# Patient Record
Sex: FEMALE | Race: WHITE | Employment: FULL TIME | URBAN - METROPOLITAN AREA
[De-identification: names, ages, dates, MRNs, and addresses within clinical notes are randomized per-mention and may not be internally consistent; named-entity substitution may affect disease eponyms.]

---

## 2010-12-23 PROCEDURE — 88342 IMHCHEM/IMCYTCHM 1ST ANTB: CPT | Performed by: PATHOLOGY

## 2017-01-01 ENCOUNTER — TELEPHONE (OUTPATIENT)
Dept: ONCOLOGY | Age: 57
End: 2017-01-01

## 2017-01-01 ENCOUNTER — DOCUMENTATION ONLY (OUTPATIENT)
Dept: ONCOLOGY | Age: 57
End: 2017-01-01

## 2017-01-01 ENCOUNTER — HOSPITAL ENCOUNTER (OUTPATIENT)
Dept: INFUSION THERAPY | Age: 57
Discharge: HOME | End: 2017-09-22
Payer: COMMERCIAL

## 2017-01-01 ENCOUNTER — HOSPITAL ENCOUNTER (OUTPATIENT)
Dept: NON INVASIVE DIAGNOSTICS | Age: 57
Discharge: HOME | End: 2017-11-10
Attending: INTERNAL MEDICINE
Payer: COMMERCIAL

## 2017-01-01 ENCOUNTER — HOSPITAL ENCOUNTER (OUTPATIENT)
Dept: CT IMAGING | Age: 57
Discharge: HOME | End: 2017-11-10
Attending: INTERNAL MEDICINE
Payer: COMMERCIAL

## 2017-01-01 ENCOUNTER — HOSPITAL ENCOUNTER (OUTPATIENT)
Dept: INFUSION THERAPY | Age: 57
Discharge: HOME | End: 2017-10-12
Payer: COMMERCIAL

## 2017-01-01 ENCOUNTER — HOSPITAL ENCOUNTER (OUTPATIENT)
Dept: LAB | Age: 57
Discharge: HOME | End: 2017-10-16

## 2017-01-01 ENCOUNTER — OFFICE VISIT (OUTPATIENT)
Dept: ONCOLOGY | Age: 57
End: 2017-01-01

## 2017-01-01 VITALS
OXYGEN SATURATION: 100 % | HEIGHT: 67 IN | TEMPERATURE: 97.7 F | HEART RATE: 74 BPM | SYSTOLIC BLOOD PRESSURE: 99 MMHG | BODY MASS INDEX: 22.13 KG/M2 | WEIGHT: 141 LBS | DIASTOLIC BLOOD PRESSURE: 68 MMHG

## 2017-01-01 VITALS
TEMPERATURE: 97 F | SYSTOLIC BLOOD PRESSURE: 102 MMHG | HEART RATE: 70 BPM | DIASTOLIC BLOOD PRESSURE: 60 MMHG | BODY MASS INDEX: 22.76 KG/M2 | WEIGHT: 141 LBS | OXYGEN SATURATION: 98 %

## 2017-01-01 VITALS
RESPIRATION RATE: 16 BRPM | OXYGEN SATURATION: 98 % | SYSTOLIC BLOOD PRESSURE: 102 MMHG | BODY MASS INDEX: 23.85 KG/M2 | HEART RATE: 80 BPM | TEMPERATURE: 98.4 F | WEIGHT: 148.4 LBS | DIASTOLIC BLOOD PRESSURE: 67 MMHG | HEIGHT: 66 IN

## 2017-01-01 DIAGNOSIS — C50.919 MALIGNANT NEOPLASM OF BREAST, STAGE 4, UNSPECIFIED LATERALITY (HCC): Primary | ICD-10-CM

## 2017-01-01 DIAGNOSIS — C78.7 LIVER METASTASES (HCC): ICD-10-CM

## 2017-01-01 DIAGNOSIS — Z09 CHEMOTHERAPY FOLLOW-UP EXAMINATION: ICD-10-CM

## 2017-01-01 DIAGNOSIS — C79.51 BONE METASTASES (HCC): ICD-10-CM

## 2017-01-01 DIAGNOSIS — C50.919 MALIGNANT NEOPLASM OF BREAST, STAGE 4, UNSPECIFIED LATERALITY (HCC): ICD-10-CM

## 2017-01-01 LAB
ALBUMIN SERPL-MCNC: 3.6 G/DL (ref 3.5–5)
ALBUMIN SERPL-MCNC: 3.6 G/DL (ref 3.5–5)
ALBUMIN/GLOB SERPL: 0.9 {RATIO} (ref 1.1–2.2)
ALBUMIN/GLOB SERPL: 0.9 {RATIO} (ref 1.1–2.2)
ALP SERPL-CCNC: 167 U/L (ref 45–117)
ALP SERPL-CCNC: 213 U/L (ref 45–117)
ALT SERPL-CCNC: 120 U/L (ref 12–78)
ALT SERPL-CCNC: 120 U/L (ref 12–78)
ANION GAP SERPL CALC-SCNC: 7 MMOL/L (ref 5–15)
ANION GAP SERPL CALC-SCNC: 9 MMOL/L (ref 5–15)
AST SERPL-CCNC: 93 U/L (ref 15–37)
AST SERPL-CCNC: 99 U/L (ref 15–37)
BASO+EOS+MONOS # BLD AUTO: 0.5 K/UL (ref 0.2–1.2)
BASO+EOS+MONOS # BLD AUTO: 0.6 K/UL (ref 0.2–1.2)
BASO+EOS+MONOS # BLD AUTO: 11 % (ref 3.2–16.9)
BASO+EOS+MONOS # BLD AUTO: 13 % (ref 3.2–16.9)
BILIRUB SERPL-MCNC: 0.3 MG/DL (ref 0.2–1)
BILIRUB SERPL-MCNC: 0.3 MG/DL (ref 0.2–1)
BUN SERPL-MCNC: 14 MG/DL (ref 6–20)
BUN SERPL-MCNC: 14 MG/DL (ref 6–20)
BUN/CREAT SERPL: 19 (ref 12–20)
BUN/CREAT SERPL: 23 (ref 12–20)
CALCIUM SERPL-MCNC: 8.7 MG/DL (ref 8.5–10.1)
CALCIUM SERPL-MCNC: 8.9 MG/DL (ref 8.5–10.1)
CANCER AG15-3 SERPL-ACNC: 91.6 U/ML (ref 0–25)
CANCER AG27-29 SERPL-ACNC: 103.6 U/ML (ref 0–38.6)
CHLORIDE SERPL-SCNC: 104 MMOL/L (ref 97–108)
CHLORIDE SERPL-SCNC: 105 MMOL/L (ref 97–108)
CO2 SERPL-SCNC: 28 MMOL/L (ref 21–32)
CO2 SERPL-SCNC: 30 MMOL/L (ref 21–32)
CREAT SERPL-MCNC: 0.62 MG/DL (ref 0.55–1.02)
CREAT SERPL-MCNC: 0.74 MG/DL (ref 0.55–1.02)
DIFFERENTIAL METHOD BLD: ABNORMAL
DIFFERENTIAL METHOD BLD: NORMAL
ERYTHROCYTE [DISTWIDTH] IN BLOOD BY AUTOMATED COUNT: 14 % (ref 11.8–15.8)
ERYTHROCYTE [DISTWIDTH] IN BLOOD BY AUTOMATED COUNT: 14.5 % (ref 11.8–15.8)
GLOBULIN SER CALC-MCNC: 3.8 G/DL (ref 2–4)
GLOBULIN SER CALC-MCNC: 4.1 G/DL (ref 2–4)
GLUCOSE SERPL-MCNC: 108 MG/DL (ref 65–100)
GLUCOSE SERPL-MCNC: 108 MG/DL (ref 65–100)
HCT VFR BLD AUTO: 34.8 % (ref 35–47)
HCT VFR BLD AUTO: 36.2 % (ref 35–47)
HGB BLD-MCNC: 12.1 G/DL (ref 11.5–16)
HGB BLD-MCNC: 12.2 G/DL (ref 11.5–16)
LYMPHOCYTES # BLD: 1.3 K/UL (ref 0.8–3.5)
LYMPHOCYTES # BLD: 1.3 K/UL (ref 0.8–3.5)
LYMPHOCYTES NFR BLD: 28 % (ref 12–49)
LYMPHOCYTES NFR BLD: 28 % (ref 12–49)
MCH RBC QN AUTO: 31.2 PG (ref 26–34)
MCH RBC QN AUTO: 32 PG (ref 26–34)
MCHC RBC AUTO-ENTMCNC: 33.7 G/DL (ref 30–36.5)
MCHC RBC AUTO-ENTMCNC: 34.8 G/DL (ref 30–36.5)
MCV RBC AUTO: 92.1 FL (ref 80–99)
MCV RBC AUTO: 92.6 FL (ref 80–99)
NEUTS SEG # BLD: 2.7 K/UL (ref 1.8–8)
NEUTS SEG # BLD: 2.8 K/UL (ref 1.8–8)
NEUTS SEG NFR BLD: 59 % (ref 32–75)
NEUTS SEG NFR BLD: 61 % (ref 32–75)
PLATELET # BLD AUTO: 201 K/UL (ref 150–400)
PLATELET # BLD AUTO: 230 K/UL (ref 150–400)
POTASSIUM SERPL-SCNC: 3.7 MMOL/L (ref 3.5–5.1)
POTASSIUM SERPL-SCNC: 3.8 MMOL/L (ref 3.5–5.1)
PROT SERPL-MCNC: 7.4 G/DL (ref 6.4–8.2)
PROT SERPL-MCNC: 7.7 G/DL (ref 6.4–8.2)
RBC # BLD AUTO: 3.78 M/UL (ref 3.8–5.2)
RBC # BLD AUTO: 3.91 M/UL (ref 3.8–5.2)
SODIUM SERPL-SCNC: 141 MMOL/L (ref 136–145)
SODIUM SERPL-SCNC: 142 MMOL/L (ref 136–145)
WBC # BLD AUTO: 4.6 K/UL (ref 3.6–11)
WBC # BLD AUTO: 4.6 K/UL (ref 3.6–11)

## 2017-01-01 PROCEDURE — 86300 IMMUNOASSAY TUMOR CA 15-3: CPT | Performed by: INTERNAL MEDICINE

## 2017-01-01 PROCEDURE — 80053 COMPREHEN METABOLIC PANEL: CPT | Performed by: INTERNAL MEDICINE

## 2017-01-01 PROCEDURE — 77030012965 HC NDL HUBR BBMI -A

## 2017-01-01 PROCEDURE — 93306 TTE W/DOPPLER COMPLETE: CPT

## 2017-01-01 PROCEDURE — 74011000250 HC RX REV CODE- 250: Performed by: INTERNAL MEDICINE

## 2017-01-01 PROCEDURE — 36415 COLL VENOUS BLD VENIPUNCTURE: CPT | Performed by: INTERNAL MEDICINE

## 2017-01-01 PROCEDURE — 74011250636 HC RX REV CODE- 250/636: Performed by: INTERNAL MEDICINE

## 2017-01-01 PROCEDURE — 74176 CT ABD & PELVIS W/O CONTRAST: CPT

## 2017-01-01 PROCEDURE — 96413 CHEMO IV INFUSION 1 HR: CPT

## 2017-01-01 PROCEDURE — 85025 COMPLETE CBC W/AUTO DIFF WBC: CPT | Performed by: INTERNAL MEDICINE

## 2017-01-01 PROCEDURE — 71250 CT THORAX DX C-: CPT

## 2017-01-01 RX ORDER — SODIUM CHLORIDE 9 MG/ML
10 INJECTION INTRAMUSCULAR; INTRAVENOUS; SUBCUTANEOUS AS NEEDED
Status: ACTIVE | OUTPATIENT
Start: 2017-01-01 | End: 2017-01-01

## 2017-01-01 RX ORDER — PROCHLORPERAZINE EDISYLATE 5 MG/ML
10 INJECTION INTRAMUSCULAR; INTRAVENOUS
Status: ACTIVE | OUTPATIENT
Start: 2017-01-01 | End: 2017-01-01

## 2017-01-01 RX ORDER — HEPARIN 100 UNIT/ML
500 SYRINGE INTRAVENOUS AS NEEDED
Status: ACTIVE | OUTPATIENT
Start: 2017-01-01 | End: 2017-01-01

## 2017-01-01 RX ORDER — DEXAMETHASONE SODIUM PHOSPHATE 4 MG/ML
8 INJECTION, SOLUTION INTRA-ARTICULAR; INTRALESIONAL; INTRAMUSCULAR; INTRAVENOUS; SOFT TISSUE ONCE
Status: ACTIVE | OUTPATIENT
Start: 2017-01-01 | End: 2017-01-01

## 2017-01-01 RX ORDER — SODIUM CHLORIDE 9 MG/ML
25 INJECTION, SOLUTION INTRAVENOUS CONTINUOUS
Status: DISPENSED | OUTPATIENT
Start: 2017-01-01 | End: 2017-01-01

## 2017-01-01 RX ORDER — SODIUM CHLORIDE 0.9 % (FLUSH) 0.9 %
5-10 SYRINGE (ML) INJECTION AS NEEDED
Status: ACTIVE | OUTPATIENT
Start: 2017-01-01 | End: 2017-01-01

## 2017-01-01 RX ADMIN — Medication 500 UNITS: at 15:40

## 2017-01-01 RX ADMIN — SODIUM CHLORIDE 25 ML/HR: 900 INJECTION, SOLUTION INTRAVENOUS at 14:40

## 2017-01-01 RX ADMIN — Medication 10 ML: at 15:29

## 2017-01-01 RX ADMIN — ADO-TRASTUZUMAB EMTANSINE 200 MG: 20 INJECTION, POWDER, LYOPHILIZED, FOR SOLUTION INTRAVENOUS at 15:03

## 2017-01-01 RX ADMIN — SODIUM CHLORIDE 25 ML/HR: 900 INJECTION, SOLUTION INTRAVENOUS at 15:00

## 2017-01-01 RX ADMIN — ADO-TRASTUZUMAB EMTANSINE 200 MG: 20 INJECTION, POWDER, LYOPHILIZED, FOR SOLUTION INTRAVENOUS at 14:38

## 2017-01-01 RX ADMIN — SODIUM CHLORIDE 10 ML: 9 INJECTION INTRAMUSCULAR; INTRAVENOUS; SUBCUTANEOUS at 15:30

## 2017-01-01 RX ADMIN — Medication 10 ML: at 15:40

## 2017-01-01 RX ADMIN — SODIUM CHLORIDE 10 ML: 9 INJECTION INTRAMUSCULAR; INTRAVENOUS; SUBCUTANEOUS at 13:30

## 2017-01-01 RX ADMIN — HEPARIN SODIUM (PORCINE) LOCK FLUSH IV SOLN 100 UNIT/ML 500 UNITS: 100 SOLUTION at 15:29

## 2017-01-03 ENCOUNTER — TELEPHONE (OUTPATIENT)
Dept: ONCOLOGY | Age: 57
End: 2017-01-03

## 2017-01-03 RX ORDER — SODIUM CHLORIDE 9 MG/ML
25 INJECTION, SOLUTION INTRAVENOUS CONTINUOUS
Status: DISPENSED | OUTPATIENT
Start: 2017-01-09 | End: 2017-01-10

## 2017-01-06 ENCOUNTER — TELEPHONE (OUTPATIENT)
Dept: ONCOLOGY | Age: 57
End: 2017-01-06

## 2017-01-06 NOTE — TELEPHONE ENCOUNTER
Message left for patient re:  Questions prior to herceptin Monday. Will need appointment changed to Ruma's schedule on 1/12/17. To PSR.

## 2017-01-09 ENCOUNTER — DOCUMENTATION ONLY (OUTPATIENT)
Dept: ONCOLOGY | Age: 57
End: 2017-01-09

## 2017-01-09 ENCOUNTER — HOSPITAL ENCOUNTER (OUTPATIENT)
Dept: INFUSION THERAPY | Age: 57
Discharge: HOME | End: 2017-01-09
Payer: COMMERCIAL

## 2017-01-09 VITALS
BODY MASS INDEX: 23.4 KG/M2 | WEIGHT: 149.1 LBS | HEART RATE: 84 BPM | OXYGEN SATURATION: 100 % | TEMPERATURE: 98.3 F | RESPIRATION RATE: 16 BRPM | SYSTOLIC BLOOD PRESSURE: 97 MMHG | HEIGHT: 67 IN | DIASTOLIC BLOOD PRESSURE: 63 MMHG

## 2017-01-09 LAB
ALBUMIN SERPL BCP-MCNC: 3.3 G/DL (ref 3.5–5)
ALBUMIN/GLOB SERPL: 0.9 {RATIO} (ref 1.1–2.2)
ALP SERPL-CCNC: 69 U/L (ref 45–117)
ALT SERPL-CCNC: 39 U/L (ref 12–78)
ANION GAP BLD CALC-SCNC: 8 MMOL/L (ref 5–15)
AST SERPL W P-5'-P-CCNC: 26 U/L (ref 15–37)
BASOPHILS # BLD AUTO: 0 K/UL (ref 0–0.1)
BASOPHILS # BLD: 0 % (ref 0–1)
BILIRUB SERPL-MCNC: 0.2 MG/DL (ref 0.2–1)
BUN SERPL-MCNC: 14 MG/DL (ref 6–20)
BUN/CREAT SERPL: 22 (ref 12–20)
CALCIUM SERPL-MCNC: 8.5 MG/DL (ref 8.5–10.1)
CHLORIDE SERPL-SCNC: 106 MMOL/L (ref 97–108)
CO2 SERPL-SCNC: 26 MMOL/L (ref 21–32)
CREAT SERPL-MCNC: 0.63 MG/DL (ref 0.55–1.02)
DIFFERENTIAL METHOD BLD: NORMAL
EOSINOPHIL # BLD: 0.1 K/UL (ref 0–0.4)
EOSINOPHIL NFR BLD: 1 % (ref 0–7)
ERYTHROCYTE [DISTWIDTH] IN BLOOD BY AUTOMATED COUNT: 13 % (ref 11.5–14.5)
GLOBULIN SER CALC-MCNC: 3.7 G/DL (ref 2–4)
GLUCOSE SERPL-MCNC: 147 MG/DL (ref 65–100)
HCT VFR BLD AUTO: 35.8 % (ref 35–47)
HGB BLD-MCNC: 11.9 G/DL (ref 11.5–16)
LYMPHOCYTES # BLD AUTO: 13 % (ref 12–49)
LYMPHOCYTES # BLD: 0.8 K/UL (ref 0.8–3.5)
MAGNESIUM SERPL-MCNC: 2.2 MG/DL (ref 1.6–2.4)
MCH RBC QN AUTO: 31 PG (ref 26–34)
MCHC RBC AUTO-ENTMCNC: 33.2 G/DL (ref 30–36.5)
MCV RBC AUTO: 93.2 FL (ref 80–99)
MONOCYTES # BLD: 0.8 K/UL (ref 0–1)
MONOCYTES NFR BLD AUTO: 13 % (ref 5–13)
NEUTS SEG # BLD: 4.4 K/UL (ref 1.8–8)
NEUTS SEG NFR BLD AUTO: 73 % (ref 32–75)
PLATELET # BLD AUTO: 345 K/UL (ref 150–400)
POTASSIUM SERPL-SCNC: 3.7 MMOL/L (ref 3.5–5.1)
PROT SERPL-MCNC: 7 G/DL (ref 6.4–8.2)
RBC # BLD AUTO: 3.84 M/UL (ref 3.8–5.2)
RBC MORPH BLD: NORMAL
SODIUM SERPL-SCNC: 140 MMOL/L (ref 136–145)
WBC # BLD AUTO: 6.1 K/UL (ref 3.6–11)

## 2017-01-09 PROCEDURE — 85025 COMPLETE CBC W/AUTO DIFF WBC: CPT | Performed by: INTERNAL MEDICINE

## 2017-01-09 PROCEDURE — 86300 IMMUNOASSAY TUMOR CA 15-3: CPT | Performed by: INTERNAL MEDICINE

## 2017-01-09 PROCEDURE — 74011250636 HC RX REV CODE- 250/636: Performed by: INTERNAL MEDICINE

## 2017-01-09 PROCEDURE — 83735 ASSAY OF MAGNESIUM: CPT | Performed by: INTERNAL MEDICINE

## 2017-01-09 PROCEDURE — 96413 CHEMO IV INFUSION 1 HR: CPT

## 2017-01-09 PROCEDURE — 36415 COLL VENOUS BLD VENIPUNCTURE: CPT | Performed by: INTERNAL MEDICINE

## 2017-01-09 PROCEDURE — 80053 COMPREHEN METABOLIC PANEL: CPT | Performed by: INTERNAL MEDICINE

## 2017-01-09 PROCEDURE — 77030012965 HC NDL HUBR BBMI -A

## 2017-01-09 RX ORDER — SODIUM CHLORIDE 0.9 % (FLUSH) 0.9 %
10-40 SYRINGE (ML) INJECTION AS NEEDED
Status: ACTIVE | OUTPATIENT
Start: 2017-01-09 | End: 2017-01-10

## 2017-01-09 RX ORDER — SODIUM CHLORIDE 9 MG/ML
10 INJECTION INTRAMUSCULAR; INTRAVENOUS; SUBCUTANEOUS AS NEEDED
Status: ACTIVE | OUTPATIENT
Start: 2017-01-09 | End: 2017-01-10

## 2017-01-09 RX ORDER — HEPARIN 100 UNIT/ML
500 SYRINGE INTRAVENOUS AS NEEDED
Status: ACTIVE | OUTPATIENT
Start: 2017-01-09 | End: 2017-01-10

## 2017-01-09 RX ADMIN — HEPARIN SODIUM (PORCINE) LOCK FLUSH IV SOLN 100 UNIT/ML 500 UNITS: 100 SOLUTION at 16:40

## 2017-01-09 RX ADMIN — Medication 554 MG: at 15:05

## 2017-01-09 RX ADMIN — Medication 10 ML: at 16:37

## 2017-01-09 RX ADMIN — SODIUM CHLORIDE 25 ML/HR: 900 INJECTION, SOLUTION INTRAVENOUS at 15:00

## 2017-01-09 NOTE — TELEPHONE ENCOUNTER
Call returned to patient. ML to return call. Herceptin patients are seen every 6 weeks while on treatment.

## 2017-01-09 NOTE — PROGRESS NOTES
[facility] VISIT NOTE    7778  Pt arrived at [facility] ambulatory and in no distress for C1 HERCEPTIN. Assessment completed, pt has no Concerns . Chemotherapy Flowsheet 1/9/2017   Cycle C1   Date 1/9/2017   Drug / Regimen Trastuzumab        Right chest port accessed with 0.75 in low with no difficulty. Positive blood return noted and labs drawn. Recent Results (from the past 12 hour(s))   CBC WITH AUTOMATED DIFF    Collection Time: 01/09/17  2:25 PM   Result Value Ref Range    WBC 6.1 3.6 - 11.0 K/uL    RBC 3.84 3.80 - 5.20 M/uL    HGB 11.9 11.5 - 16.0 g/dL    HCT 35.8 35.0 - 47.0 %    MCV 93.2 80.0 - 99.0 FL    MCH 31.0 26.0 - 34.0 PG    MCHC 33.2 30.0 - 36.5 g/dL    RDW 13.0 11.5 - 14.5 %    PLATELET 706 950 - 860 K/uL    NEUTROPHILS 73 32 - 75 %    LYMPHOCYTES 13 12 - 49 %    MONOCYTES 13 5 - 13 %    EOSINOPHILS 1 0 - 7 %    BASOPHILS 0 0 - 1 %    ABS. NEUTROPHILS 4.4 1.8 - 8.0 K/UL    ABS. LYMPHOCYTES 0.8 0.8 - 3.5 K/UL    ABS. MONOCYTES 0.8 0.0 - 1.0 K/UL    ABS. EOSINOPHILS 0.1 0.0 - 0.4 K/UL    ABS. BASOPHILS 0.0 0.0 - 0.1 K/UL    DF SMEAR SCANNED      RBC COMMENTS NORMOCYTIC, NORMOCHROMIC     METABOLIC PANEL, COMPREHENSIVE    Collection Time: 01/09/17  2:25 PM   Result Value Ref Range    Sodium 140 136 - 145 mmol/L    Potassium 3.7 3.5 - 5.1 mmol/L    Chloride 106 97 - 108 mmol/L    CO2 26 21 - 32 mmol/L    Anion gap 8 5 - 15 mmol/L    Glucose 147 (H) 65 - 100 mg/dL    BUN 14 6 - 20 MG/DL    Creatinine 0.63 0.55 - 1.02 MG/DL    BUN/Creatinine ratio 22 (H) 12 - 20      GFR est AA >60 >60 ml/min/1.73m2    GFR est non-AA >60 >60 ml/min/1.73m2    Calcium 8.5 8.5 - 10.1 MG/DL    Bilirubin, total 0.2 0.2 - 1.0 MG/DL    ALT 39 12 - 78 U/L    AST 26 15 - 37 U/L    Alk.  phosphatase 69 45 - 117 U/L    Protein, total 7.0 6.4 - 8.2 g/dL    Albumin 3.3 (L) 3.5 - 5.0 g/dL    Globulin 3.7 2.0 - 4.0 g/dL    A-G Ratio 0.9 (L) 1.1 - 2.2     MAGNESIUM    Collection Time: 01/09/17  2:25 PM   Result Value Ref Range Magnesium 2.2 1.6 - 2.4 mg/dL       Medications received:  Trastuzumab    Tolerated treatment well, no adverse reaction noted. Port de-accessed and flushed per protocol. Positive blood return noted. Patient refused 30 min wait   Patient Vitals for the past 12 hrs:   Temp Pulse Resp BP SpO2   01/09/17 1636 98.3 °F (36.8 °C) 84 16 97/63 100 %   01/09/17 1415 98.9 °F (37.2 °C) 88 16 100/66 100 %       1640  D/C'd from Health system ambulatory and in no distress. Next appointment ? Efren Nguyen

## 2017-01-09 NOTE — TELEPHONE ENCOUNTER
HIPAA verified. Stated has no questions. Wanted to see Dr. Rafael Tong in 3 weeks versus this week. Patient with prior herceptin therapy and side effects reviewed to report to provider. To provider for ok to be seen in 3 weeks.

## 2017-01-10 NOTE — TELEPHONE ENCOUNTER
Order sent to [facility] to schedule every other Herceptin appointment at [facility] starting 1/30/17. She will receive every other infusion at [address] [place] [address].

## 2017-01-10 NOTE — TELEPHONE ENCOUNTER
Call to [facility] EVIN SMILEY/Eliud to verify latest herceptin appt. Per Yani Aviles 2p is latest time patient can be scheduled.

## 2017-01-11 ENCOUNTER — DOCUMENTATION ONLY (OUTPATIENT)
Dept: ONCOLOGY | Age: 57
End: 2017-01-11

## 2017-01-11 LAB
CANCER AG15-3 SERPL-ACNC: 147.8 U/ML (ref 0–25)
CANCER AG27-29 SERPL-ACNC: 185.5 U/ML (ref 0–38.6)

## 2017-01-11 NOTE — PROGRESS NOTES
Herceptin orders to schedule every other treatment at [facility] faxed to [facility] 1/10/17 per NP. Will need [place] appt 1/30/17.

## 2017-01-25 ENCOUNTER — DOCUMENTATION ONLY (OUTPATIENT)
Dept: ONCOLOGY | Age: 57
End: 2017-01-25

## 2017-01-27 RX ORDER — SODIUM CHLORIDE 9 MG/ML
25 INJECTION, SOLUTION INTRAVENOUS CONTINUOUS
Status: DISPENSED | OUTPATIENT
Start: 2017-01-31 | End: 2017-02-01

## 2017-01-30 ENCOUNTER — APPOINTMENT (OUTPATIENT)
Dept: INFUSION THERAPY | Age: 57
End: 2017-01-30
Payer: COMMERCIAL

## 2017-01-31 ENCOUNTER — OFFICE VISIT (OUTPATIENT)
Dept: ONCOLOGY | Age: 57
End: 2017-01-31

## 2017-01-31 ENCOUNTER — HOSPITAL ENCOUNTER (OUTPATIENT)
Dept: INFUSION THERAPY | Age: 57
Discharge: HOME | End: 2017-01-31
Payer: COMMERCIAL

## 2017-01-31 VITALS
OXYGEN SATURATION: 99 % | RESPIRATION RATE: 16 BRPM | HEART RATE: 99 BPM | DIASTOLIC BLOOD PRESSURE: 85 MMHG | HEIGHT: 66 IN | BODY MASS INDEX: 24.14 KG/M2 | TEMPERATURE: 98.1 F | SYSTOLIC BLOOD PRESSURE: 137 MMHG | WEIGHT: 150.2 LBS

## 2017-01-31 VITALS
HEART RATE: 80 BPM | RESPIRATION RATE: 16 BRPM | SYSTOLIC BLOOD PRESSURE: 104 MMHG | DIASTOLIC BLOOD PRESSURE: 60 MMHG | TEMPERATURE: 97.7 F

## 2017-01-31 DIAGNOSIS — C78.7 LIVER METASTASES (HCC): ICD-10-CM

## 2017-01-31 DIAGNOSIS — Z51.81 ENCOUNTER FOR MONITORING CARDIOTOXIC DRUG THERAPY: ICD-10-CM

## 2017-01-31 DIAGNOSIS — Z79.899 ENCOUNTER FOR MONITORING CARDIOTOXIC DRUG THERAPY: ICD-10-CM

## 2017-01-31 DIAGNOSIS — C79.51 BONE METASTASES (HCC): ICD-10-CM

## 2017-01-31 DIAGNOSIS — C50.919 BREAST CANCER, STAGE 4, UNSPECIFIED LATERALITY (HCC): Primary | ICD-10-CM

## 2017-01-31 PROCEDURE — 74011250636 HC RX REV CODE- 250/636: Performed by: INTERNAL MEDICINE

## 2017-01-31 PROCEDURE — 74011250636 HC RX REV CODE- 250/636

## 2017-01-31 PROCEDURE — 96413 CHEMO IV INFUSION 1 HR: CPT

## 2017-01-31 PROCEDURE — 74011000250 HC RX REV CODE- 250

## 2017-01-31 PROCEDURE — 77030012965 HC NDL HUBR BBMI -A

## 2017-01-31 RX ORDER — SODIUM CHLORIDE 0.9 % (FLUSH) 0.9 %
5-10 SYRINGE (ML) INJECTION AS NEEDED
Status: DISCONTINUED | OUTPATIENT
Start: 2017-01-31 | End: 2017-02-04 | Stop reason: HOSPADM

## 2017-01-31 RX ORDER — HEPARIN 100 UNIT/ML
500 SYRINGE INTRAVENOUS AS NEEDED
Status: ACTIVE | OUTPATIENT
Start: 2017-01-31 | End: 2017-02-01

## 2017-01-31 RX ORDER — SODIUM CHLORIDE 9 MG/ML
10 INJECTION INTRAMUSCULAR; INTRAVENOUS; SUBCUTANEOUS AS NEEDED
Status: ACTIVE | OUTPATIENT
Start: 2017-01-31 | End: 2017-02-01

## 2017-01-31 RX ORDER — OXYCODONE HYDROCHLORIDE 5 MG/1
TABLET ORAL
Refills: 0 | COMMUNITY
Start: 2016-11-28 | End: 2017-01-31

## 2017-01-31 RX ADMIN — Medication 10 ML: at 16:13

## 2017-01-31 RX ADMIN — TRASTUZUMAB 415 MG: KIT at 15:41

## 2017-01-31 RX ADMIN — SODIUM CHLORIDE 25 ML/HR: 900 INJECTION, SOLUTION INTRAVENOUS at 15:10

## 2017-01-31 RX ADMIN — Medication 500 UNITS: at 16:13

## 2017-01-31 RX ADMIN — SODIUM CHLORIDE 10 ML: 9 INJECTION, SOLUTION INTRAMUSCULAR; INTRAVENOUS; SUBCUTANEOUS at 15:10

## 2017-01-31 NOTE — PROGRESS NOTES
[facility] - Chemotherapy Progress Note    1500 Pt admit to [facility] for cycle 2 herceptin ambulatory in stable condition. Assessment completed. No new concerns voiced. Port with positive blood return. Labs drawn 1/9/17, drawn every six weeks. Patient will have appointments here at [facility] and at [facility]. Chemotherapy Flowsheet 1/31/2017   Cycle C2   Date 1/31/2017   Drug / Regimen herceptin   Notes given     Visit Vitals    /68 (BP 1 Location: Right arm, BP Patient Position: Sitting)    Pulse 79    Temp 97 °F (36.1 °C)    Resp 16    Breastfeeding No     Medications:  Normal saline  herceptin  Heparin flush    1620 Pt tolerated treatment well. Port maintained positive blood return throughout treatment. Flushed, heparinized and de-accessed per protocol. D/c home ambulatory in no distress. Pt aware of next appointment scheduled for 2/21/17 at 1400 at [address] [place] [address] location for herceptin.

## 2017-01-31 NOTE — PROGRESS NOTES
HEME/ONC CONSULT       Toyin Nance is a 64 y.o. 1960 female and presents with Breast Cancer    CC  Breast cancer 2004    HPI: Ms. Bear Holliday is here today for follow-up for stage 4 breast cancer, now on Herceptin. She has completed radiation to her spine since she was last here for cord compression. She reports her back pain has mostly resolved and she is off dexamethasone. She has follow-up with radiation oncology planned within the next 2 weeks. She reports that due to the loading dose of Herceptin she received with her first treatment, she felt \"flu-like\" afterwards but symptoms resolved within 3 days of the infusion. She denies nausea, vomiting, or diarrhea. She denies shortness of breath, chest pain, or palpitations. She denies headaches or vision changes. She has had no new areas of pain. She denies bowel or bladder incontinence or weakness of her upper or lower extremities. She does state that it was difficult to eat while receiving radiation due to irritation of the esophagus but that has now resolved. She is eating and drinking well. Last visit: Pt seen today for fu for stage 4 breast cancer ER+ HEr2 + not on any treatment for over a year per pt preference. Pt had CTs and MRI which showed progressive disease. Pt is here with family to discuss results and treatment options. Pt continues with severe back pain. Has not started decadron yet. DX   Encounter Diagnoses   Name Primary?  Breast cancer, stage 4, unspecified laterality ([address] Yes    Encounter for monitoring cardiotoxic drug therapy     Liver metastases ([address]     Bone metastases ([address]         STAGE: 4 liver and bones    TREATMENT COURSE: 4/2004, s/p L mastectomy with Dr. Saurav Randall at [facility]. Stage IIB, T2N1M0, 3/39 LN +, ER negative, OH +, HER 2 +, was enrolled on NSABP B-31 trial with adriamycin/cytoxan q 3 weeks x 4 followed by taxol weekly with herceptin x 12 then completion of a year of herceptin. States she did not receive XRT. Did receive 5 years of tamoxifen from 5238-9455, which she tolerated well. In 2009 she noticed a chest wall mass. 11/23/10 FNA of chest wall mass showed ER + > 90%, NV + > 90%, MIB 50%, HER 2 + by FISH ratio 6.29. S/p BSO 12/27/10. Received zometa from 2010 to 2011. In 2011, she was started on q 3 week trastuzumab and exemestane, but states she took the exemestane sparingly due to joint pains and body aches. Has not taken any AI since 2013. Briefly took anastrozole in 5/2014. Stopped anastrozole 1/1/15 due to continued body and joint aches. No past medical history on file. Past Surgical History   Procedure Laterality Date    Pr breast surgery procedure unlisted  2004     mastectomy    Hx gyn  2010     ovaries removed     Social History     Social History    Marital status:      Spouse name: N/A    Number of children: N/A    Years of education: N/A     Social History Main Topics    Smoking status: Never Smoker    Smokeless tobacco: Never Used    Alcohol use Yes      Comment: 3-4 drinks/month    Drug use: No    Sexual activity: No     Other Topics Concern    None     Social History Narrative     Family History   Problem Relation Age of Onset    Hypertension Mother     Cancer Father      mesothelioma    Cancer Maternal Grandfather 76     Bladder       Current Outpatient Prescriptions   Medication Sig Dispense Refill    MILK THISTLE, BULK, by Does Not Apply route.  IODINE PO Take  by mouth.  BETA 1,3 GLUCAN, BULK, 3 Tabs by Does Not Apply route daily.  OTHER,NON-FORMULARY, Mushroom extract twice daily; Crocifurous extract      TURMERIC, BULK, by Does Not Apply route daily.  cholecalciferol (VITAMIN D3) 1,000 unit tablet Take 4,000 Units by mouth daily.  multivitamin (ONE A DAY) tablet Take 1 tablet by mouth daily.  Aspirin, Buffered 81 mg tab Take  by mouth daily.  coenzyme q10 (CO Q-10) 10 mg cap Take  by mouth daily.       LACTOBAC CMB #3/FOS/PANTETHINE (PROBIOTIC & ACIDOPHILUS PO) Take  by mouth daily.  magnesium 250 mg tab Take 500 mg by mouth daily.  HYDROmorphone (DILAUDID) 2 mg tablet Take 1 Tab by mouth every four (4) hours as needed for Pain. Max Daily Amount: 12 mg. Indications: PAIN 60 Tab 0    dexamethasone (DECADRON) 4 mg tablet Take 1 Tab by mouth two (2) times daily (with meals).  60 Tab 1     Facility-Administered Medications Ordered in Other Visits   Medication Dose Route Frequency Provider Last Rate Last Dose    sodium chloride (NS) flush 5-10 mL  5-10 mL IntraVENous PRN Zoey Crenshaw MD   10 mL at 01/31/17 1613    sodium chloride 0.9 % injection 10 mL  10 mL IntraVENous PRN Zoey Crenshaw MD   10 mL at 01/31/17 1510    heparin (porcine) pf 500 Units  500 Units IntraVENous PRN Zoey Crenshaw MD   500 Units at 01/31/17 1613    0.9% sodium chloride infusion  25 mL/hr IntraVENous CONTINUOUS Darylene DO Carroll   Stopped at 01/31/17 1613       Allergies   Allergen Reactions    Pcn [Penicillins] Anaphylaxis       Review of Systems    A comprehensive review of systems was negative except for: per HPI       Objective:  Visit Vitals    /85    Pulse 99    Temp 98.1 °F (36.7 °C) (Oral)    Resp 16    Ht 5' 6\" (1.676 m)    Wt 150 lb 3.2 oz (68.1 kg)    SpO2 99%    BMI 24.24 kg/m2         Physical Exam:   General appearance - alert, well appearing, and in no distress, oriented to person, place, and time and normal appearing weight  Mental status - alert, oriented to person, place, and time, normal mood, behavior, speech, dress, motor activity, and thought processes  EYE-conj clear  Mouth - mucous membranes moist, pharynx normal without lesions  Neck - supple, no adenopathy appreciated  Chest - clear to auscultation, no wheezes, rales or rhonchi, symmetric air entry   Heart - normal rate and regular rhythm   Abdomen - soft, nontender, nondistended, no masses or organomegaly  Ext-no pedal edema noted  Skin-Warm and dry. Intact without rash. Neuro -alert, oriented, normal speech, no focal findings or movement disorder noted    Diagnostic Imaging   reviewed    Results for orders placed during the hospital encounter of 12/07/16   CT CHEST ABD PELV W CONT    Narrative STUDY: CT CHEST ABD PELV W CONT, ABDOMEN, AND PELVIS WITH CONTRAST  Clinical history: Metastatic breast cancer, evaluate for progression      INDICATION: metastatic breast cancer, evaluate disease status    CONTRAST: 95 mL [id number]        COMPARISON: 8/10/2015, 11/10/14    TECHNIQUE:  Computed tomography images of the chest, abdomen, and pelvis were  obtained from the thoracic inlet through the pubic symphysis via spiral  acquisition after the administration of intravenous contrast. Multiplanar  reconstructions were performed in soft tissue and lung windows. FINDINGS:   CHEST:  Thyroid:  Normal in appearance. Mediastinum: There has been revision of a right IJ Port-A-Cath. A Port-A-Cath  tip projects in the right atrium. Normal size heart. No significant pericardial  fluid. Normal-sized main pulmonary artery. No lymphadenopathy. Lungs/pleura: Patent central airways. No airspace disease or pleural effusion. Unchanged nonspecific subcentimeter groundglass opacity left lower lobe . ABDOMEN AND PELVIS:  Liver and gallbladder:           Hepatic hypodensities    Segment 2 2-54 left hepatic lobe 12.8 x 12.8 mm. Right hepatic lobe 2-53 9 x 9 mm. Right hepatic lobe 2-43 8 x 8 mm. There are additionally 2 tiny hypodensities adjacent to the posterior right  hepatic vein also seen on image 2-53 approximately 4 to 5 mm in size. .       Normal-appearing gallbladder. Spleen, adrenal glands, and pancreas:  Normal in appearance. Kidneys:  Symmetric enhancement. No hydronephrosis. Bowel and peritoneum:  No bowel obstruction. No ascites. Genitourinary:  Normal appearing uterus, adnexa, and bladder. Vasculature and lymph nodes:  Patent vasculature. No lymphadenopathy. Osseous structures the chest, abdomen, and pelvis: Unchanged sclerotic  metastasis with severe pathologic compression fracture at T9. Unchanged  sclerotic metastases seen in the spine. Body wall:  Status post left mastectomy and breast implant placement. Postsurgical changes seen in the left axilla. Impression IMPRESSION:  1. Findings are consistent with interval progression of disease. There are new  hepatic lesions present up to 13 x 13 mm in size. .    2. Unchanged osseous metastases. Lab Results  reviewed  Lab Results   Component Value Date/Time    WBC 6.1 01/09/2017 02:25 PM    HGB 11.9 01/09/2017 02:25 PM    HCT 35.8 01/09/2017 02:25 PM    PLATELET 289 31/62/7229 02:25 PM    MCV 93.2 01/09/2017 02:25 PM       Lab Results   Component Value Date/Time    Sodium 140 01/09/2017 02:25 PM    Potassium 3.7 01/09/2017 02:25 PM    Chloride 106 01/09/2017 02:25 PM    CO2 26 01/09/2017 02:25 PM    Anion gap 8 01/09/2017 02:25 PM    Glucose 147 01/09/2017 02:25 PM    BUN 14 01/09/2017 02:25 PM    Creatinine 0.63 01/09/2017 02:25 PM    BUN/Creatinine ratio 22 01/09/2017 02:25 PM    GFR est AA >60 01/09/2017 02:25 PM    GFR est non-AA >60 01/09/2017 02:25 PM    Calcium 8.5 01/09/2017 02:25 PM    ALT 39 01/09/2017 02:25 PM    AST 26 01/09/2017 02:25 PM    Alk. phosphatase 69 01/09/2017 02:25 PM    Protein, total 7.0 01/09/2017 02:25 PM    Albumin 3.3 01/09/2017 02:25 PM    Globulin 3.7 01/09/2017 02:25 PM    A-G Ratio 0.9 01/09/2017 02:25 PM       Assessment/Plan:    Dani Andrade is a 64 y.o. female and presents with Breast Cancer    CC  Breast cancer 2004    DX   Encounter Diagnoses   Name Primary?  Breast cancer, stage 4, unspecified laterality ([address] Yes    Liver metastases ([address]     Bone metastases ([address]     Left-sided thoracic back pain, unspecified chronicity         STAGE: 4 liver and bones    TREATMENT COURSE: 4/2004, s/p L mastectomy with Dr. Ross Flynn at [facility].  Stage IIB, T2N1M0, 3/39 LN +, ER negative, SC +, HER 2 +, was enrolled on NSABP B-31 trial with adriamycin/cytoxan q 3 weeks x 4 followed by taxol weekly with herceptin x 12 then completion of a year of herceptin. States she did not receive XRT. Did receive 5 years of tamoxifen from 3403-7489, which she tolerated well. In 2009 she noticed a chest wall mass. 11/23/10 FNA of chest wall mass showed ER + > 90%, SC + > 90%, MIB 50%, HER 2 + by FISH ratio 6.29. S/p BSO 12/27/10. Received zometa from 2010 to 2011. In 2011, she was started on q 3 week trastuzumab and exemestane, but states she took the exemestane sparingly due to joint pains and body aches. Has not taken any AI since 2013. Briefly took anastrozole in 5/2014. Stopped anastrozole 1/1/15 due to continued body and joint aches. 1.  Stage 4 breast cancer ER+ HER2 amplified at recurrence with mets to liver and bones in past.     Not on any therapy now and has worsening new worsening back pain. Pt has been doing alternatives therapies but states she is now open to some standard therapies. We reviewed CTs today which show worsening bone and liver mets. MRI T spine showed new T8 met with slight cord compression. She is s/p radiation to the spine. Back pain has resolved    She is now off dexamethasone as well. Patient was willing to do Herceptin only and is now receiving Herceptin every 3 weeks. She declined other options including hormonal therapy. ECHO 12/16/16 showed EF 55%. Patient does have questions about additional blood tests through possible trial she found. She will send more information via [employer] to review. 2. Back pain. Resolved. She is s/p radiation to the spine for cord compression. Seen in conjunction with Beverly Crisostomo NP. Follow-up in 6 weeks. ICD-10-CM ICD-9-CM    1. Breast cancer, stage 4, unspecified laterality ([facility] C50.919 174.9 2D ECHO COMPLETE ADULT (TTE) W OR WO CONTR   2.  Encounter for monitoring cardiotoxic drug therapy Z51.81 V58.83 2D ECHO COMPLETE ADULT (TTE) W OR WO CONTR    Z79.899 V58.69    3. Liver metastases (HCC) C78.7 197.7    4. Bone metastases (HCC) C79.51 198.5        Current Outpatient Prescriptions   Medication Sig    MILK THISTLE, BULK, by Does Not Apply route.  IODINE PO Take  by mouth.  BETA 1,3 GLUCAN, BULK, 3 Tabs by Does Not Apply route daily.  OTHER,NON-FORMULARY, Mushroom extract twice daily; Crocifurous extract    TURMERIC, BULK, by Does Not Apply route daily.  cholecalciferol (VITAMIN D3) 1,000 unit tablet Take 4,000 Units by mouth daily.  multivitamin (ONE A DAY) tablet Take 1 tablet by mouth daily.  Aspirin, Buffered 81 mg tab Take  by mouth daily.  coenzyme q10 (CO Q-10) 10 mg cap Take  by mouth daily.  LACTOBAC CMB #3/FOS/PANTETHINE (PROBIOTIC & ACIDOPHILUS PO) Take  by mouth daily.  magnesium 250 mg tab Take 500 mg by mouth daily.  HYDROmorphone (DILAUDID) 2 mg tablet Take 1 Tab by mouth every four (4) hours as needed for Pain. Max Daily Amount: 12 mg. Indications: PAIN    dexamethasone (DECADRON) 4 mg tablet Take 1 Tab by mouth two (2) times daily (with meals). No current facility-administered medications for this visit. Facility-Administered Medications Ordered in Other Visits   Medication Dose Route Frequency    sodium chloride (NS) flush 5-10 mL  5-10 mL IntraVENous PRN    sodium chloride 0.9 % injection 10 mL  10 mL IntraVENous PRN    heparin (porcine) pf 500 Units  500 Units IntraVENous PRN    0.9% sodium chloride infusion  25 mL/hr IntraVENous CONTINUOUS       continue present plan, routine labs ordered, call if any problems  There are no Patient Instructions on file for this visit. Follow-up Disposition:  Return in about 6 weeks (around 3/14/2017).     Saqib Wang NP

## 2017-01-31 NOTE — PROGRESS NOTES
Problem: Patient Education: Go to Education Activity  Goal: Patient/Family Education  Outcome: Progressing Towards Goal  No labs today, 30 minutes infuse time

## 2017-02-07 ENCOUNTER — DOCUMENTATION ONLY (OUTPATIENT)
Dept: ONCOLOGY | Age: 57
End: 2017-02-07

## 2017-02-07 NOTE — PROGRESS NOTES
ECHO Scheduled for Feb 23 @1:00pm    [address], [address] [place] Phone: [phone]    Patient notified

## 2017-02-16 RX ORDER — SODIUM CHLORIDE 9 MG/ML
25 INJECTION, SOLUTION INTRAVENOUS CONTINUOUS
Status: DISPENSED | OUTPATIENT
Start: 2017-02-21 | End: 2017-02-22

## 2017-02-20 ENCOUNTER — APPOINTMENT (OUTPATIENT)
Dept: INFUSION THERAPY | Age: 57
End: 2017-02-20

## 2017-02-20 ENCOUNTER — TELEPHONE (OUTPATIENT)
Dept: ONCOLOGY | Age: 57
End: 2017-02-20

## 2017-02-20 NOTE — TELEPHONE ENCOUNTER
Skin symptoms are unlikely from Herceptin. She can use moisturizer for sensitive skin such as Aquaphor or Eucerin to see if this helps. She should follow-up with PCP for further evaluation. There is no problem with her continuing with Herceptin treatment tomorrow.

## 2017-02-20 NOTE — TELEPHONE ENCOUNTER
Julia Graham states that she has a herceptin treatment tomorrow. Feels like Dagoberto Watson has a sunburn\" no rash, no itch, no noticeable skin irritation, worsens as day progresses, does not interfere with sleep. Started 5 days ago. She would like to know if she should get treatment tomorrow, she isn't sure what could be the cause and is concerned it may worsen.

## 2017-02-20 NOTE — TELEPHONE ENCOUNTER
Call returned to patient. HIPAA verified. Notified of note per provider. She verbalizes understanding & asks if burning sensation could be effects from radiation treatments which she reports ended in January. Reports burning sensation to abdomen, groin & upper thighs. Confirms she will proceed with Herceptin treatment tomorrow as scheduled.

## 2017-02-21 ENCOUNTER — HOSPITAL ENCOUNTER (OUTPATIENT)
Dept: INFUSION THERAPY | Age: 57
Discharge: HOME | End: 2017-02-21
Payer: COMMERCIAL

## 2017-02-21 VITALS
HEIGHT: 67 IN | TEMPERATURE: 97.3 F | BODY MASS INDEX: 23.75 KG/M2 | OXYGEN SATURATION: 100 % | HEART RATE: 81 BPM | DIASTOLIC BLOOD PRESSURE: 73 MMHG | WEIGHT: 151.3 LBS | RESPIRATION RATE: 16 BRPM | SYSTOLIC BLOOD PRESSURE: 112 MMHG

## 2017-02-21 LAB
ALBUMIN SERPL BCP-MCNC: 3.8 G/DL (ref 3.5–5)
ALBUMIN/GLOB SERPL: 1.1 {RATIO} (ref 1.1–2.2)
ALP SERPL-CCNC: 69 U/L (ref 45–117)
ALT SERPL-CCNC: 76 U/L (ref 12–78)
ANION GAP BLD CALC-SCNC: 10 MMOL/L (ref 5–15)
AST SERPL W P-5'-P-CCNC: 40 U/L (ref 15–37)
BASO+EOS+MONOS # BLD AUTO: 0.4 K/UL (ref 0.2–1.2)
BASO+EOS+MONOS # BLD AUTO: 10 % (ref 3.2–16.9)
BILIRUB SERPL-MCNC: 0.2 MG/DL (ref 0.2–1)
BUN SERPL-MCNC: 22 MG/DL (ref 6–20)
BUN/CREAT SERPL: 33 (ref 12–20)
CALCIUM SERPL-MCNC: 8.9 MG/DL (ref 8.5–10.1)
CHLORIDE SERPL-SCNC: 103 MMOL/L (ref 97–108)
CO2 SERPL-SCNC: 27 MMOL/L (ref 21–32)
CREAT SERPL-MCNC: 0.66 MG/DL (ref 0.55–1.02)
DIFFERENTIAL METHOD BLD: ABNORMAL
ERYTHROCYTE [DISTWIDTH] IN BLOOD BY AUTOMATED COUNT: 13.3 % (ref 11.8–15.8)
GLOBULIN SER CALC-MCNC: 3.4 G/DL (ref 2–4)
GLUCOSE SERPL-MCNC: 104 MG/DL (ref 65–100)
HCT VFR BLD AUTO: 33.4 % (ref 35–47)
HGB BLD-MCNC: 11.2 G/DL (ref 11.5–16)
LYMPHOCYTES # BLD AUTO: 24 % (ref 12–49)
LYMPHOCYTES # BLD: 0.9 K/UL (ref 0.8–3.5)
MAGNESIUM SERPL-MCNC: 2.4 MG/DL (ref 1.6–2.4)
MCH RBC QN AUTO: 31.1 PG (ref 26–34)
MCHC RBC AUTO-ENTMCNC: 33.5 G/DL (ref 30–36.5)
MCV RBC AUTO: 92.8 FL (ref 80–99)
NEUTS SEG # BLD: 2.7 K/UL (ref 1.8–8)
NEUTS SEG NFR BLD AUTO: 66 % (ref 32–75)
PLATELET # BLD AUTO: 267 K/UL (ref 150–400)
POTASSIUM SERPL-SCNC: 3.9 MMOL/L (ref 3.5–5.1)
PROT SERPL-MCNC: 7.2 G/DL (ref 6.4–8.2)
RBC # BLD AUTO: 3.6 M/UL (ref 3.8–5.2)
SODIUM SERPL-SCNC: 140 MMOL/L (ref 136–145)
WBC # BLD AUTO: 4 K/UL (ref 3.6–11)

## 2017-02-21 PROCEDURE — 74011250636 HC RX REV CODE- 250/636

## 2017-02-21 PROCEDURE — 36415 COLL VENOUS BLD VENIPUNCTURE: CPT | Performed by: INTERNAL MEDICINE

## 2017-02-21 PROCEDURE — 83735 ASSAY OF MAGNESIUM: CPT | Performed by: INTERNAL MEDICINE

## 2017-02-21 PROCEDURE — 85025 COMPLETE CBC W/AUTO DIFF WBC: CPT | Performed by: INTERNAL MEDICINE

## 2017-02-21 PROCEDURE — 86300 IMMUNOASSAY TUMOR CA 15-3: CPT | Performed by: INTERNAL MEDICINE

## 2017-02-21 PROCEDURE — 80053 COMPREHEN METABOLIC PANEL: CPT | Performed by: INTERNAL MEDICINE

## 2017-02-21 PROCEDURE — 74011000250 HC RX REV CODE- 250

## 2017-02-21 PROCEDURE — 96413 CHEMO IV INFUSION 1 HR: CPT

## 2017-02-21 PROCEDURE — 74011250636 HC RX REV CODE- 250/636: Performed by: INTERNAL MEDICINE

## 2017-02-21 PROCEDURE — 77030012965 HC NDL HUBR BBMI -A

## 2017-02-21 RX ORDER — HEPARIN 100 UNIT/ML
500 SYRINGE INTRAVENOUS AS NEEDED
Status: ACTIVE | OUTPATIENT
Start: 2017-02-21 | End: 2017-02-22

## 2017-02-21 RX ORDER — SODIUM CHLORIDE 0.9 % (FLUSH) 0.9 %
10 SYRINGE (ML) INJECTION AS NEEDED
Status: ACTIVE | OUTPATIENT
Start: 2017-02-21 | End: 2017-02-22

## 2017-02-21 RX ORDER — SODIUM CHLORIDE 9 MG/ML
10 INJECTION INTRAMUSCULAR; INTRAVENOUS; SUBCUTANEOUS AS NEEDED
Status: ACTIVE | OUTPATIENT
Start: 2017-02-21 | End: 2017-02-22

## 2017-02-21 RX ADMIN — SODIUM CHLORIDE 25 ML/HR: 900 INJECTION, SOLUTION INTRAVENOUS at 14:37

## 2017-02-21 RX ADMIN — HEPARIN 500 UNITS: 100 SYRINGE at 16:27

## 2017-02-21 RX ADMIN — Medication 415 MG: at 15:26

## 2017-02-21 RX ADMIN — Medication 10 ML: at 14:21

## 2017-02-21 RX ADMIN — SODIUM CHLORIDE 10 ML: 9 INJECTION, SOLUTION INTRAMUSCULAR; INTRAVENOUS; SUBCUTANEOUS at 14:20

## 2017-02-21 RX ADMIN — Medication 10 ML: at 14:22

## 2017-02-21 RX ADMIN — Medication 10 ML: at 16:27

## 2017-02-21 NOTE — PROGRESS NOTES
[facility] VISIT NOTE    1400 hrs -  Pt arrived at [facility] ambulatory and in no distress for Trastuzumab C3. Assessment completed, pt c/o \"burning sensation of the abdominal area; MD aware. Skin free of redness or swelling, c/d/i; pt reports sensation is not topical but more visceral; \"feels like it did when I had radiation. \" Pt had last radiation cycle in Jan.     RCW port accessed with . 75 in low with no difficulty. Positive blood return noted and labs drawn. Medications received:  NS IV  Herceptin IV    Tolerated treatment well, no adverse reaction noted. Port de-accessed and flushed per protocol. Positive blood return noted. Pt remained at [facility] for 30 minutes. VSS  Patient Vitals for the past 12 hrs:   Temp Pulse Resp BP SpO2   02/21/17 1624 97.3 °F (36.3 °C) 81 16 112/73 100 %   02/21/17 1408 97.4 °F (36.3 °C) 79 16 93/67 100 %         1630 hrs -  D/C'd from [facility] ambulatory and in no distress. Hetal Rosa  Next appointment is 03/14/17 @ 2 pm.

## 2017-02-21 NOTE — TELEPHONE ENCOUNTER
It would be unusual for her to have burning that started so many weeks after radiation. She should follow-up with radiation oncology if concerned or PCP for evaluation.

## 2017-02-23 ENCOUNTER — CLINICAL SUPPORT (OUTPATIENT)
Dept: CARDIOLOGY CLINIC | Age: 57
End: 2017-02-23

## 2017-02-23 DIAGNOSIS — C50.919 BREAST CANCER, STAGE 4, UNSPECIFIED LATERALITY (HCC): ICD-10-CM

## 2017-02-23 DIAGNOSIS — Z51.81 ENCOUNTER FOR MONITORING CARDIOTOXIC DRUG THERAPY: ICD-10-CM

## 2017-02-23 DIAGNOSIS — Z79.899 ENCOUNTER FOR MONITORING CARDIOTOXIC DRUG THERAPY: ICD-10-CM

## 2017-02-23 LAB
CANCER AG15-3 SERPL-ACNC: 98.4 U/ML (ref 0–25)
CANCER AG27-29 SERPL-ACNC: 115.6 U/ML (ref 0–38.6)

## 2017-03-09 RX ORDER — SODIUM CHLORIDE 9 MG/ML
25 INJECTION, SOLUTION INTRAVENOUS CONTINUOUS
Status: DISPENSED | OUTPATIENT
Start: 2017-03-14 | End: 2017-03-15

## 2017-03-13 ENCOUNTER — APPOINTMENT (OUTPATIENT)
Dept: INFUSION THERAPY | Age: 57
End: 2017-03-13

## 2017-03-14 ENCOUNTER — OFFICE VISIT (OUTPATIENT)
Dept: ONCOLOGY | Age: 57
End: 2017-03-14

## 2017-03-14 ENCOUNTER — HOSPITAL ENCOUNTER (OUTPATIENT)
Dept: INFUSION THERAPY | Age: 57
Discharge: HOME | End: 2017-03-14
Payer: COMMERCIAL

## 2017-03-14 VITALS
DIASTOLIC BLOOD PRESSURE: 75 MMHG | SYSTOLIC BLOOD PRESSURE: 109 MMHG | OXYGEN SATURATION: 100 % | TEMPERATURE: 98 F | RESPIRATION RATE: 18 BRPM | HEART RATE: 90 BPM

## 2017-03-14 VITALS
HEIGHT: 67 IN | OXYGEN SATURATION: 99 % | WEIGHT: 150.8 LBS | SYSTOLIC BLOOD PRESSURE: 113 MMHG | DIASTOLIC BLOOD PRESSURE: 71 MMHG | TEMPERATURE: 97.6 F | HEART RATE: 86 BPM | BODY MASS INDEX: 23.67 KG/M2 | RESPIRATION RATE: 16 BRPM

## 2017-03-14 DIAGNOSIS — C79.51 BONE METASTASES (HCC): ICD-10-CM

## 2017-03-14 DIAGNOSIS — C50.919 BREAST CANCER, STAGE 4, UNSPECIFIED LATERALITY (HCC): Primary | ICD-10-CM

## 2017-03-14 DIAGNOSIS — C78.7 LIVER METASTASES (HCC): ICD-10-CM

## 2017-03-14 DIAGNOSIS — M54.6 LEFT-SIDED THORACIC BACK PAIN, UNSPECIFIED CHRONICITY: ICD-10-CM

## 2017-03-14 LAB
ALBUMIN SERPL BCP-MCNC: 4 G/DL (ref 3.5–5)
ALBUMIN/GLOB SERPL: 1.3 {RATIO} (ref 1.1–2.2)
ALP SERPL-CCNC: 85 U/L (ref 45–117)
ALT SERPL-CCNC: 42 U/L (ref 12–78)
ANION GAP BLD CALC-SCNC: 7 MMOL/L (ref 5–15)
AST SERPL W P-5'-P-CCNC: 40 U/L (ref 15–37)
BASOPHILS # BLD AUTO: 0 K/UL (ref 0–0.1)
BASOPHILS # BLD: 0 % (ref 0–1)
BILIRUB SERPL-MCNC: 0.3 MG/DL (ref 0.2–1)
BUN SERPL-MCNC: 12 MG/DL (ref 6–20)
BUN/CREAT SERPL: 17 (ref 12–20)
CALCIUM SERPL-MCNC: 8.9 MG/DL (ref 8.5–10.1)
CHLORIDE SERPL-SCNC: 105 MMOL/L (ref 97–108)
CO2 SERPL-SCNC: 27 MMOL/L (ref 21–32)
CREAT SERPL-MCNC: 0.71 MG/DL (ref 0.55–1.02)
EOSINOPHIL # BLD: 0 K/UL (ref 0–0.4)
EOSINOPHIL NFR BLD: 1 % (ref 0–7)
ERYTHROCYTE [DISTWIDTH] IN BLOOD BY AUTOMATED COUNT: 12.8 % (ref 11.5–14.5)
GLOBULIN SER CALC-MCNC: 3.2 G/DL (ref 2–4)
GLUCOSE SERPL-MCNC: 81 MG/DL (ref 65–100)
HCT VFR BLD AUTO: 34.2 % (ref 35–47)
HGB BLD-MCNC: 11.4 G/DL (ref 11.5–16)
LYMPHOCYTES # BLD AUTO: 20 % (ref 12–49)
LYMPHOCYTES # BLD: 1 K/UL (ref 0.8–3.5)
MCH RBC QN AUTO: 30.8 PG (ref 26–34)
MCHC RBC AUTO-ENTMCNC: 33.3 G/DL (ref 30–36.5)
MCV RBC AUTO: 92.4 FL (ref 80–99)
MONOCYTES # BLD: 0.5 K/UL (ref 0–1)
MONOCYTES NFR BLD AUTO: 10 % (ref 5–13)
NEUTS SEG # BLD: 3.3 K/UL (ref 1.8–8)
NEUTS SEG NFR BLD AUTO: 69 % (ref 32–75)
PLATELET # BLD AUTO: 291 K/UL (ref 150–400)
POTASSIUM SERPL-SCNC: 3.7 MMOL/L (ref 3.5–5.1)
PROT SERPL-MCNC: 7.2 G/DL (ref 6.4–8.2)
RBC # BLD AUTO: 3.7 M/UL (ref 3.8–5.2)
SODIUM SERPL-SCNC: 139 MMOL/L (ref 136–145)
WBC # BLD AUTO: 4.8 K/UL (ref 3.6–11)

## 2017-03-14 PROCEDURE — 74011000250 HC RX REV CODE- 250: Performed by: INTERNAL MEDICINE

## 2017-03-14 PROCEDURE — 85025 COMPLETE CBC W/AUTO DIFF WBC: CPT | Performed by: INTERNAL MEDICINE

## 2017-03-14 PROCEDURE — 36415 COLL VENOUS BLD VENIPUNCTURE: CPT | Performed by: INTERNAL MEDICINE

## 2017-03-14 PROCEDURE — 80053 COMPREHEN METABOLIC PANEL: CPT | Performed by: INTERNAL MEDICINE

## 2017-03-14 PROCEDURE — 74011250636 HC RX REV CODE- 250/636: Performed by: INTERNAL MEDICINE

## 2017-03-14 PROCEDURE — 96413 CHEMO IV INFUSION 1 HR: CPT

## 2017-03-14 PROCEDURE — 77030012965 HC NDL HUBR BBMI -A

## 2017-03-14 RX ORDER — HEPARIN 100 UNIT/ML
500 SYRINGE INTRAVENOUS AS NEEDED
Status: ACTIVE | OUTPATIENT
Start: 2017-03-14 | End: 2017-03-15

## 2017-03-14 RX ORDER — SODIUM CHLORIDE 0.9 % (FLUSH) 0.9 %
5-10 SYRINGE (ML) INJECTION AS NEEDED
Status: ACTIVE | OUTPATIENT
Start: 2017-03-14 | End: 2017-03-15

## 2017-03-14 RX ORDER — SODIUM CHLORIDE 9 MG/ML
10 INJECTION INTRAMUSCULAR; INTRAVENOUS; SUBCUTANEOUS AS NEEDED
Status: ACTIVE | OUTPATIENT
Start: 2017-03-14 | End: 2017-03-15

## 2017-03-14 RX ADMIN — Medication 10 ML: at 14:21

## 2017-03-14 RX ADMIN — SODIUM CHLORIDE 10 ML: 9 INJECTION, SOLUTION INTRAMUSCULAR; INTRAVENOUS; SUBCUTANEOUS at 14:20

## 2017-03-14 RX ADMIN — SODIUM CHLORIDE 25 ML/HR: 900 INJECTION, SOLUTION INTRAVENOUS at 14:21

## 2017-03-14 RX ADMIN — Medication 500 UNITS: at 14:22

## 2017-03-14 RX ADMIN — TRASTUZUMAB 415 MG: KIT at 15:53

## 2017-03-14 NOTE — PROGRESS NOTES
Chief Complaint   Patient presents with    Breast Cancer     1. Have you been to the ER, urgent care clinic since your last visit? Hospitalized since your last visit? No    2. Have you seen or consulted any other health care providers outside of the [address] [place] [address] since your last visit? Include any pap smears or colon screening.  No

## 2017-03-14 NOTE — PROGRESS NOTES
HEME/ONC CONSULT       Miriam Verma is a 64 y.o. 1960 female and presents with Breast Cancer    CC  Breast cancer 2004    HPI: Ms. Breanne Stovall is here today for follow-up for stage 4 breast cancer, now on Herceptin. She has completed radiation to her spine cord compression in December 2016. She is tolerating maintenance Herceptin well. She did experience a burning sensation to her lower abdomen and upper thighs that lasted for approximately 2 weeks before resolving. She has no symptoms currently. She denies any rash formation or skin changes at the time. She denies any pain at this time, specifically no back pain. She denies headaches or vision changes. She has had no nausea, vomiting, or diarrhea. She denies numbness or tingling to her extremities. She has had no bowel or bladder dysfunction. She denies shortness of breath or chest pain. DX   Encounter Diagnoses   Name Primary?  Breast cancer, stage 4, unspecified laterality ([address] Yes    Bone metastases ([address]     Liver metastases ([address]         STAGE: 4 liver and bones    TREATMENT COURSE: 4/2004, s/p L mastectomy with Dr. Bran Fu at [facility]. Stage IIB, T2N1M0, 3/39 LN +, ER negative, MN +, HER 2 +, was enrolled on NSABP B-31 trial with adriamycin/cytoxan q 3 weeks x 4 followed by taxol weekly with herceptin x 12 then completion of a year of herceptin. States she did not receive XRT. Did receive 5 years of tamoxifen from [id number], which she tolerated well. In 2009 she noticed a chest wall mass. 11/23/10 FNA of chest wall mass showed ER + > 90%, MN + > 90%, MIB 50%, HER 2 + by FISH ratio 6.29. S/p BSO 12/27/10. Received zometa from 2010 to 2011. In 2011, she was started on q 3 week trastuzumab and exemestane, but states she took the exemestane sparingly due to joint pains and body aches. Has not taken any AI since 2013. Briefly took anastrozole in 5/2014. Stopped anastrozole 1/1/15 due to continued body and joint aches.   Started Herceptin 1/9/17    History reviewed. No pertinent past medical history. Past Surgical History:   Procedure Laterality Date    BREAST SURGERY PROCEDURE UNLISTED  2004    mastectomy    HX GYN  2010    ovaries removed     Social History     Social History    Marital status:      Spouse name: N/A    Number of children: N/A    Years of education: N/A     Social History Main Topics    Smoking status: Never Smoker    Smokeless tobacco: Never Used    Alcohol use Yes      Comment: 3-4 drinks/month    Drug use: No    Sexual activity: No     Other Topics Concern    None     Social History Narrative     Family History   Problem Relation Age of Onset    Hypertension Mother     Cancer Father      mesothelioma    Cancer Maternal Grandfather 76     Bladder       Current Outpatient Prescriptions   Medication Sig Dispense Refill    MILK THISTLE, BULK, by Does Not Apply route.  BETA 1,3 GLUCAN, BULK, 3 Tabs by Does Not Apply route daily.  OTHER,NON-FORMULARY, Mushroom extract twice daily; Crocifurous extract      TURMERIC, BULK, by Does Not Apply route daily.  cholecalciferol (VITAMIN D3) 1,000 unit tablet Take 4,000 Units by mouth daily.  multivitamin (ONE A DAY) tablet Take 1 tablet by mouth daily.  Aspirin, Buffered 81 mg tab Take  by mouth daily.  coenzyme q10 (CO Q-10) 10 mg cap Take  by mouth daily.  LACTOBAC CMB #3/FOS/PANTETHINE (PROBIOTIC & ACIDOPHILUS PO) Take  by mouth daily.  magnesium 250 mg tab Take 500 mg by mouth daily.  IODINE PO Take  by mouth.  HYDROmorphone (DILAUDID) 2 mg tablet Take 1 Tab by mouth every four (4) hours as needed for Pain. Max Daily Amount: 12 mg.  Indications: PAIN 60 Tab 0       Allergies   Allergen Reactions    Pcn [Penicillins] Anaphylaxis       Review of Systems    A comprehensive review of systems was negative except for: per HPI     Objective:  Visit Vitals    /71 (BP 1 Location: Right arm, BP Patient Position: Sitting)  Pulse 86    Temp 97.6 °F (36.4 °C) (Oral)    Resp 16    Ht 5' 6.93\" (1.7 m)    Wt 150 lb 12.8 oz (68.4 kg)    SpO2 99%    BMI 23.67 kg/m2     Physical Exam:   General appearance - alert, well appearing, and in no distress, oriented to person, place, and time and normal appearing weight  Mental status - alert, oriented to person, place, and time, normal mood, behavior, speech, dress, motor activity, and thought processes  EYE-conj clear  Mouth - mucous membranes moist, pharynx normal without lesions  Neck - supple, no adenopathy appreciated  Chest - clear to auscultation, no wheezes, rales or rhonchi, symmetric air entry   Heart - normal rate and regular rhythm   Abdomen - soft, nontender, nondistended, no masses or organomegaly  Ext-no pedal edema noted  Skin-Warm and dry. Intact without rash. Neuro -alert, oriented, normal speech, no focal findings or movement disorder noted    Diagnostic Imaging   reviewed    Results for orders placed during the hospital encounter of 12/07/16   CT CHEST ABD PELV W CONT    Narrative STUDY: CT CHEST ABD PELV W CONT, ABDOMEN, AND PELVIS WITH CONTRAST  Clinical history: Metastatic breast cancer, evaluate for progression      INDICATION: metastatic breast cancer, evaluate disease status    CONTRAST: 95 mL [id number]        COMPARISON: 8/10/2015, 11/10/14    TECHNIQUE:  Computed tomography images of the chest, abdomen, and pelvis were  obtained from the thoracic inlet through the pubic symphysis via spiral  acquisition after the administration of intravenous contrast. Multiplanar  reconstructions were performed in soft tissue and lung windows. FINDINGS:   CHEST:  Thyroid:  Normal in appearance. Mediastinum: There has been revision of a right IJ Port-A-Cath. A Port-A-Cath  tip projects in the right atrium. Normal size heart. No significant pericardial  fluid. Normal-sized main pulmonary artery. No lymphadenopathy. Lungs/pleura: Patent central airways.  No airspace disease or pleural effusion. Unchanged nonspecific subcentimeter groundglass opacity left lower lobe . ABDOMEN AND PELVIS:  Liver and gallbladder:           Hepatic hypodensities    Segment 2 2-54 left hepatic lobe 12.8 x 12.8 mm. Right hepatic lobe 2-53 9 x 9 mm. Right hepatic lobe 2-43 8 x 8 mm. There are additionally 2 tiny hypodensities adjacent to the posterior right  hepatic vein also seen on image 2-53 approximately 4 to 5 mm in size. .       Normal-appearing gallbladder. Spleen, adrenal glands, and pancreas:  Normal in appearance. Kidneys:  Symmetric enhancement. No hydronephrosis. Bowel and peritoneum:  No bowel obstruction. No ascites. Genitourinary:  Normal appearing uterus, adnexa, and bladder. Vasculature and lymph nodes:  Patent vasculature. No lymphadenopathy. Osseous structures the chest, abdomen, and pelvis: Unchanged sclerotic  metastasis with severe pathologic compression fracture at T9. Unchanged  sclerotic metastases seen in the spine. Body wall:  Status post left mastectomy and breast implant placement. Postsurgical changes seen in the left axilla. Impression IMPRESSION:  1. Findings are consistent with interval progression of disease. There are new  hepatic lesions present up to 13 x 13 mm in size. .    2. Unchanged osseous metastases.          Lab Results  reviewed  Lab Results   Component Value Date/Time    WBC 4.8 03/14/2017 02:04 PM    HGB 11.4 03/14/2017 02:04 PM    HCT 34.2 03/14/2017 02:04 PM    PLATELET 438 10/95/3852 02:04 PM    MCV 92.4 03/14/2017 02:04 PM       Lab Results   Component Value Date/Time    Sodium 139 03/14/2017 02:04 PM    Potassium 3.7 03/14/2017 02:04 PM    Chloride 105 03/14/2017 02:04 PM    CO2 27 03/14/2017 02:04 PM    Anion gap 7 03/14/2017 02:04 PM    Glucose 81 03/14/2017 02:04 PM    BUN 12 03/14/2017 02:04 PM    Creatinine 0.71 03/14/2017 02:04 PM    BUN/Creatinine ratio 17 03/14/2017 02:04 PM    GFR est AA >60 03/14/2017 02:04 PM    GFR est non-AA >60 03/14/2017 02:04 PM    Calcium 8.9 03/14/2017 02:04 PM    AST (SGOT) 40 03/14/2017 02:04 PM    Alk. phosphatase 85 03/14/2017 02:04 PM    Protein, total 7.2 03/14/2017 02:04 PM    Albumin 4.0 03/14/2017 02:04 PM    Globulin 3.2 03/14/2017 02:04 PM    A-G Ratio 1.3 03/14/2017 02:04 PM    ALT (SGPT) 42 03/14/2017 02:04 PM       Assessment/Plan:    1. Stage 4 breast cancer ER+ HER2 amplified at recurrence with mets to liver and bones in past.   Patient presented in 12/2016 with worsening back pain. MRI spine showed cord compression. CT's showed worsened bone and liver disease. She completed radiation to the spine. She was only willing to do herceptin and is now receiving Herceptin every 3 weeks. Patient wants to discuss Herceptin every 4 weeks. Discussed that changing frequency to every 4 weeks could alter effectiveness of treatment. She declined other options including hormonal therapy. Her last ECHO 2/23/17 showed EF 61% and stable. Next ECHO due by 5/23/17. Ordered today. CT's and MRI ordered today to evaluate disease response to therapy. 2. Back pain. Resolved. No pain at this time. She is s/p radiation to the spine for cord compression. Follow-up in 6 weeks. Seen in conjunction with Neil Quiroz NP. ICD-10-CM ICD-9-CM    1. Breast cancer, stage 4, unspecified laterality (HCC) C50.919 174.9 CT CHEST ABD PELV W CONT   2. Bone metastases (HCC) C79.51 198.5 CT CHEST ABD PELV W CONT   3. Liver metastases (HCC) C78.7 197.7 CT CHEST ABD PELV W CONT       Current Outpatient Prescriptions   Medication Sig    MILK THISTLE, BULK, by Does Not Apply route.  BETA 1,3 GLUCAN, BULK, 3 Tabs by Does Not Apply route daily.  OTHER,NON-FORMULARY, Mushroom extract twice daily; Crocifurous extract    TURMERIC, BULK, by Does Not Apply route daily.  cholecalciferol (VITAMIN D3) 1,000 unit tablet Take 4,000 Units by mouth daily.     multivitamin (ONE A DAY) tablet Take 1 tablet by mouth daily.  Aspirin, Buffered 81 mg tab Take  by mouth daily.  coenzyme q10 (CO Q-10) 10 mg cap Take  by mouth daily.  LACTOBAC CMB #3/FOS/PANTETHINE (PROBIOTIC & ACIDOPHILUS PO) Take  by mouth daily.  magnesium 250 mg tab Take 500 mg by mouth daily.  IODINE PO Take  by mouth.  HYDROmorphone (DILAUDID) 2 mg tablet Take 1 Tab by mouth every four (4) hours as needed for Pain. Max Daily Amount: 12 mg. Indications: PAIN     No current facility-administered medications for this visit. continue present plan, routine labs ordered, call if any problems  There are no Patient Instructions on file for this visit. Follow-up Disposition:  Return in about 6 weeks (around 4/25/2017).     Javon Preciado NP

## 2017-03-14 NOTE — PROGRESS NOTES
[facility] VISIT NOTE    1400 hrs -  Pt arrived at [facility] ambulatory and in no distress for Trastuzumab C4. Assessment completed no new complaints. Skin free of redness or swelling. R chest port accessed with . 75 in low with no difficulty. Positive blood return noted and labs drawn. Medications received:  NS IV  Herceptin IV    Tolerated treatment well, no adverse reaction noted. Port de-accessed and flushed per protocol. Positive blood return noted. Pt remained at [facility] for 30 minutes. VSS  Patient Vitals for the past 12 hrs:   Temp Pulse Resp BP SpO2   03/14/17 1403 98 °F (36.7 °C) 84 18 98/68 100 %         1630 hrs -  D/C'd from [facility] ambulatory and in no distress. Bre Alexander  Next appointment is 04/4/17 @ 2 pm.

## 2017-03-30 RX ORDER — SODIUM CHLORIDE 9 MG/ML
25 INJECTION, SOLUTION INTRAVENOUS CONTINUOUS
Status: DISPENSED | OUTPATIENT
Start: 2017-04-04 | End: 2017-04-05

## 2017-04-03 ENCOUNTER — APPOINTMENT (OUTPATIENT)
Dept: INFUSION THERAPY | Age: 57
End: 2017-04-03

## 2017-04-04 ENCOUNTER — TELEPHONE (OUTPATIENT)
Dept: ONCOLOGY | Age: 57
End: 2017-04-04

## 2017-04-04 ENCOUNTER — HOSPITAL ENCOUNTER (OUTPATIENT)
Dept: INFUSION THERAPY | Age: 57
Discharge: HOME | End: 2017-04-04
Payer: COMMERCIAL

## 2017-04-04 VITALS
WEIGHT: 149.7 LBS | OXYGEN SATURATION: 100 % | DIASTOLIC BLOOD PRESSURE: 62 MMHG | RESPIRATION RATE: 18 BRPM | HEART RATE: 80 BPM | SYSTOLIC BLOOD PRESSURE: 101 MMHG | HEIGHT: 67 IN | TEMPERATURE: 97.2 F | BODY MASS INDEX: 23.49 KG/M2

## 2017-04-04 LAB — MAGNESIUM SERPL-MCNC: 2.1 MG/DL (ref 1.6–2.4)

## 2017-04-04 PROCEDURE — 36415 COLL VENOUS BLD VENIPUNCTURE: CPT | Performed by: INTERNAL MEDICINE

## 2017-04-04 PROCEDURE — 74011250636 HC RX REV CODE- 250/636: Performed by: INTERNAL MEDICINE

## 2017-04-04 PROCEDURE — 83735 ASSAY OF MAGNESIUM: CPT | Performed by: INTERNAL MEDICINE

## 2017-04-04 PROCEDURE — 86300 IMMUNOASSAY TUMOR CA 15-3: CPT | Performed by: INTERNAL MEDICINE

## 2017-04-04 RX ORDER — HEPARIN 100 UNIT/ML
500 SYRINGE INTRAVENOUS AS NEEDED
Status: ACTIVE | OUTPATIENT
Start: 2017-04-04 | End: 2017-04-05

## 2017-04-04 RX ORDER — SODIUM CHLORIDE 0.9 % (FLUSH) 0.9 %
10 SYRINGE (ML) INJECTION AS NEEDED
Status: ACTIVE | OUTPATIENT
Start: 2017-04-04 | End: 2017-04-05

## 2017-04-04 RX ORDER — SODIUM CHLORIDE 9 MG/ML
10 INJECTION INTRAMUSCULAR; INTRAVENOUS; SUBCUTANEOUS AS NEEDED
Status: ACTIVE | OUTPATIENT
Start: 2017-04-04 | End: 2017-04-05

## 2017-04-04 RX ADMIN — TRASTUZUMAB 415 MG: KIT at 14:50

## 2017-04-04 RX ADMIN — HEPARIN SODIUM (PORCINE) LOCK FLUSH IV SOLN 100 UNIT/ML 500 UNITS: 100 SOLUTION at 15:28

## 2017-04-04 RX ADMIN — SODIUM CHLORIDE 25 ML/HR: 900 INJECTION, SOLUTION INTRAVENOUS at 14:50

## 2017-04-04 RX ADMIN — Medication 10 ML: at 15:29

## 2017-04-04 NOTE — TELEPHONE ENCOUNTER
Call to [facility] RN/Gabriela and advised that lab request will be discussed at next visit or can follow with gyn.

## 2017-04-04 NOTE — TELEPHONE ENCOUNTER
Discussed with Dr. Jennifer Bourgeois. Can discuss labs with patient but will not order at this time as labs would not affect clinical decisions for her treatment. If patient would like these labs monitored, she should follow-up with gynecology. Called patient to relay information, no answer, left voicemail.

## 2017-04-04 NOTE — TELEPHONE ENCOUNTER
Call from [facility] RN to check status of patient's lab request.  Aren Chavez still pending. Thanked for call.

## 2017-04-04 NOTE — TELEPHONE ENCOUNTER
HIPAA verified. Stated would like estrogen and progesterone levels checked today with infusion. Also requested MRI as per last office visit with Dr. Zeke Vizcaino. Advised would forward to provider.

## 2017-04-04 NOTE — TELEPHONE ENCOUNTER
----- Message from [facility] sent at 4/4/2017  9:55 AM EDT -----  Regarding: Dr. Esme Sun  Pt is requesting a call back in reference to having addition blood work done today. She stated that she would like her estrogen and hormone levels checked. She want to also schedule an MRI test. Pt contact number [phone].

## 2017-04-04 NOTE — PROGRESS NOTES
Outpatient Infusion Center - Chemotherapy Progress Note    1400 Pt admit to [facility] for Herceptin ambulatory in stable condition. Assessment completed. No new concerns voiced. Portacath with positive blood return. Chemotherapy Flowsheet 4/4/2017   Cycle C 5   Date 4/4/2017   Drug / Regimen Herceptin   Notes -       Visit Vitals    /62    Pulse 80    Temp 97.2 °F (36.2 °C)    Resp 18    Ht 5' 6.93\" (1.7 m)    Wt 67.9 kg (149 lb 11.2 oz)    SpO2 100%    BMI 23.5 kg/m2       Medications:  Herceptin IV    1545 Pt tolerated treatment well. Portacath maintained positive blood return throughout treatment, flushed with positive blood return at conclusion and throughout treatment. D/c home ambulatory in no distress. Pt aware of next appointment scheduled for 4/25/17 .     Recent Results (from the past 12 hour(s))   MAGNESIUM    Collection Time: 04/04/17  2:01 PM   Result Value Ref Range    Magnesium 2.1 1.6 - 2.4 mg/dL

## 2017-04-06 LAB
CANCER AG15-3 SERPL-ACNC: 177 U/ML (ref 0–25)
CANCER AG27-29 SERPL-ACNC: 201.9 U/ML (ref 0–38.6)

## 2017-04-17 NOTE — PROGRESS NOTES
HIPAA verified. Advised of provider note. Stated wanted MRI ordered versus CT scan. Also wanted estrogen and progesterone levels checked. Was advised to follow with gyn. Patient stated would like Dr. Guy Vivar to order as tumor was \"high estrogen\". Advised would forward to provider. Thanked for call.

## 2017-04-18 ENCOUNTER — TELEPHONE (OUTPATIENT)
Dept: ONCOLOGY | Age: 57
End: 2017-04-18

## 2017-04-18 DIAGNOSIS — C79.51 BONE METASTASES (HCC): ICD-10-CM

## 2017-04-18 DIAGNOSIS — C78.7 LIVER METASTASES (HCC): ICD-10-CM

## 2017-04-18 DIAGNOSIS — C50.919 BREAST CANCER, STAGE 4, UNSPECIFIED LATERALITY (HCC): Primary | ICD-10-CM

## 2017-04-18 NOTE — TELEPHONE ENCOUNTER
Called patient. She reports she would rather have MRI and not CT scans. Discussed that MRI of the spine would show spine only and would not be evaluating disease within the body, specifically known liver disease. Patient states that she had a reaction of shortness of breath and itching following her last CT scan when she receiving IV contrast. She reports she did not previously report this. Discussed that pre-medications can be ordered  (prednisone and benadryl) to prevent reactions with future CT's. Contrast allergy added to allergy list today. Patient verbalizes that she does not want CT's at this time. She is requesting a PET scan. Discussed that PET has been denied previously. Patient would like PET ordered at this time to see if it is approved now. Will order PET and D/C CT orders. Patient denies back pain at this time. Due to patient's desire to have one test at a time, will hold on MRI of the spine for now and proceed with PET.

## 2017-04-21 RX ORDER — SODIUM CHLORIDE 9 MG/ML
25 INJECTION, SOLUTION INTRAVENOUS CONTINUOUS
Status: DISPENSED | OUTPATIENT
Start: 2017-04-25 | End: 2017-04-26

## 2017-04-24 ENCOUNTER — TELEPHONE (OUTPATIENT)
Dept: ONCOLOGY | Age: 57
End: 2017-04-24

## 2017-04-24 ENCOUNTER — APPOINTMENT (OUTPATIENT)
Dept: INFUSION THERAPY | Age: 57
End: 2017-04-24

## 2017-04-24 NOTE — TELEPHONE ENCOUNTER
HIPPA verified. Writer spoke with Reggie Romero and patient notified she needs to call her GYN  To request additional blood work the check her estrogen and hormone levels. Patient verbalized understanding.

## 2017-04-25 ENCOUNTER — HOSPITAL ENCOUNTER (OUTPATIENT)
Dept: INFUSION THERAPY | Age: 57
Discharge: HOME | End: 2017-04-25
Payer: COMMERCIAL

## 2017-04-25 ENCOUNTER — OFFICE VISIT (OUTPATIENT)
Dept: ONCOLOGY | Age: 57
End: 2017-04-25

## 2017-04-25 VITALS
OXYGEN SATURATION: 98 % | BODY MASS INDEX: 24.08 KG/M2 | RESPIRATION RATE: 16 BRPM | HEART RATE: 76 BPM | DIASTOLIC BLOOD PRESSURE: 70 MMHG | HEIGHT: 66 IN | SYSTOLIC BLOOD PRESSURE: 103 MMHG | WEIGHT: 149.8 LBS | TEMPERATURE: 97.4 F

## 2017-04-25 VITALS
TEMPERATURE: 97 F | DIASTOLIC BLOOD PRESSURE: 66 MMHG | RESPIRATION RATE: 16 BRPM | HEART RATE: 78 BPM | SYSTOLIC BLOOD PRESSURE: 96 MMHG

## 2017-04-25 DIAGNOSIS — C79.51 BONE METASTASES (HCC): ICD-10-CM

## 2017-04-25 DIAGNOSIS — C78.7 LIVER METASTASES (HCC): ICD-10-CM

## 2017-04-25 DIAGNOSIS — C50.919 BREAST CANCER, STAGE 4, UNSPECIFIED LATERALITY (HCC): Primary | ICD-10-CM

## 2017-04-25 LAB
ALBUMIN SERPL BCP-MCNC: 4 G/DL (ref 3.5–5)
ALBUMIN/GLOB SERPL: 1.1 {RATIO} (ref 1.1–2.2)
ALP SERPL-CCNC: 99 U/L (ref 45–117)
ALT SERPL-CCNC: 52 U/L (ref 12–78)
ANION GAP BLD CALC-SCNC: 6 MMOL/L (ref 5–15)
AST SERPL W P-5'-P-CCNC: 48 U/L (ref 15–37)
BASOPHILS # BLD AUTO: 0 K/UL (ref 0–0.1)
BASOPHILS # BLD: 0 % (ref 0–1)
BILIRUB SERPL-MCNC: 0.3 MG/DL (ref 0.2–1)
BUN SERPL-MCNC: 15 MG/DL (ref 6–20)
BUN/CREAT SERPL: 22 (ref 12–20)
CALCIUM SERPL-MCNC: 9 MG/DL (ref 8.5–10.1)
CHLORIDE SERPL-SCNC: 105 MMOL/L (ref 97–108)
CO2 SERPL-SCNC: 28 MMOL/L (ref 21–32)
CREAT SERPL-MCNC: 0.68 MG/DL (ref 0.55–1.02)
EOSINOPHIL # BLD: 0.1 K/UL (ref 0–0.4)
EOSINOPHIL NFR BLD: 2 % (ref 0–7)
ERYTHROCYTE [DISTWIDTH] IN BLOOD BY AUTOMATED COUNT: 12.8 % (ref 11.5–14.5)
GLOBULIN SER CALC-MCNC: 3.5 G/DL (ref 2–4)
GLUCOSE SERPL-MCNC: 105 MG/DL (ref 65–100)
HCT VFR BLD AUTO: 34.4 % (ref 35–47)
HGB BLD-MCNC: 11.8 G/DL (ref 11.5–16)
LYMPHOCYTES # BLD AUTO: 21 % (ref 12–49)
LYMPHOCYTES # BLD: 1.1 K/UL (ref 0.8–3.5)
MAGNESIUM SERPL-MCNC: 2.5 MG/DL (ref 1.6–2.4)
MCH RBC QN AUTO: 31 PG (ref 26–34)
MCHC RBC AUTO-ENTMCNC: 34.3 G/DL (ref 30–36.5)
MCV RBC AUTO: 90.3 FL (ref 80–99)
MONOCYTES # BLD: 0.5 K/UL (ref 0–1)
MONOCYTES NFR BLD AUTO: 10 % (ref 5–13)
NEUTS SEG # BLD: 3.6 K/UL (ref 1.8–8)
NEUTS SEG NFR BLD AUTO: 67 % (ref 32–75)
PLATELET # BLD AUTO: 318 K/UL (ref 150–400)
POTASSIUM SERPL-SCNC: 3.8 MMOL/L (ref 3.5–5.1)
PROT SERPL-MCNC: 7.5 G/DL (ref 6.4–8.2)
RBC # BLD AUTO: 3.81 M/UL (ref 3.8–5.2)
SODIUM SERPL-SCNC: 139 MMOL/L (ref 136–145)
WBC # BLD AUTO: 5.3 K/UL (ref 3.6–11)

## 2017-04-25 PROCEDURE — 74011250636 HC RX REV CODE- 250/636: Performed by: INTERNAL MEDICINE

## 2017-04-25 PROCEDURE — 80053 COMPREHEN METABOLIC PANEL: CPT | Performed by: INTERNAL MEDICINE

## 2017-04-25 PROCEDURE — 83735 ASSAY OF MAGNESIUM: CPT | Performed by: INTERNAL MEDICINE

## 2017-04-25 PROCEDURE — 96413 CHEMO IV INFUSION 1 HR: CPT

## 2017-04-25 PROCEDURE — 85025 COMPLETE CBC W/AUTO DIFF WBC: CPT | Performed by: INTERNAL MEDICINE

## 2017-04-25 PROCEDURE — 86300 IMMUNOASSAY TUMOR CA 15-3: CPT | Performed by: INTERNAL MEDICINE

## 2017-04-25 PROCEDURE — 77030012965 HC NDL HUBR BBMI -A

## 2017-04-25 PROCEDURE — 36415 COLL VENOUS BLD VENIPUNCTURE: CPT | Performed by: INTERNAL MEDICINE

## 2017-04-25 RX ORDER — SODIUM CHLORIDE 9 MG/ML
10 INJECTION INTRAMUSCULAR; INTRAVENOUS; SUBCUTANEOUS AS NEEDED
Status: ACTIVE | OUTPATIENT
Start: 2017-04-25 | End: 2017-04-26

## 2017-04-25 RX ORDER — SODIUM CHLORIDE 0.9 % (FLUSH) 0.9 %
10-40 SYRINGE (ML) INJECTION AS NEEDED
Status: ACTIVE | OUTPATIENT
Start: 2017-04-25 | End: 2017-04-26

## 2017-04-25 RX ORDER — HEPARIN 100 UNIT/ML
500 SYRINGE INTRAVENOUS AS NEEDED
Status: ACTIVE | OUTPATIENT
Start: 2017-04-25 | End: 2017-04-26

## 2017-04-25 RX ADMIN — TRASTUZUMAB 415 MG: KIT at 15:50

## 2017-04-25 RX ADMIN — SODIUM CHLORIDE 25 ML/HR: 900 INJECTION, SOLUTION INTRAVENOUS at 15:44

## 2017-04-25 NOTE — PROGRESS NOTES
Outpatient Infusion Center - Chemotherapy Progress Note    5884 Pt admit to [facility] for C6 Herceptin ambulatory in stable condition. Assessment completed. No new concerns voiced. Port accessed with 20 G 1 inch low needle, with positive blood return. Labs drawn per order and sent for results. Recent Results (from the past 12 hour(s))   CBC WITH AUTOMATED DIFF    Collection Time: 04/25/17  2:03 PM   Result Value Ref Range    WBC 5.3 3.6 - 11.0 K/uL    RBC 3.81 3.80 - 5.20 M/uL    HGB 11.8 11.5 - 16.0 g/dL    HCT 34.4 (L) 35.0 - 47.0 %    MCV 90.3 80.0 - 99.0 FL    MCH 31.0 26.0 - 34.0 PG    MCHC 34.3 30.0 - 36.5 g/dL    RDW 12.8 11.5 - 14.5 %    PLATELET 317 059 - 779 K/uL    NEUTROPHILS 67 32 - 75 %    LYMPHOCYTES 21 12 - 49 %    MONOCYTES 10 5 - 13 %    EOSINOPHILS 2 0 - 7 %    BASOPHILS 0 0 - 1 %    ABS. NEUTROPHILS 3.6 1.8 - 8.0 K/UL    ABS. LYMPHOCYTES 1.1 0.8 - 3.5 K/UL    ABS. MONOCYTES 0.5 0.0 - 1.0 K/UL    ABS. EOSINOPHILS 0.1 0.0 - 0.4 K/UL    ABS. BASOPHILS 0.0 0.0 - 0.1 K/UL   METABOLIC PANEL, COMPREHENSIVE    Collection Time: 04/25/17  2:03 PM   Result Value Ref Range    Sodium 139 136 - 145 mmol/L    Potassium 3.8 3.5 - 5.1 mmol/L    Chloride 105 97 - 108 mmol/L    CO2 28 21 - 32 mmol/L    Anion gap 6 5 - 15 mmol/L    Glucose 105 (H) 65 - 100 mg/dL    BUN 15 6 - 20 MG/DL    Creatinine 0.68 0.55 - 1.02 MG/DL    BUN/Creatinine ratio 22 (H) 12 - 20      GFR est AA >60 >60 ml/min/1.73m2    GFR est non-AA >60 >60 ml/min/1.73m2    Calcium 9.0 8.5 - 10.1 MG/DL    Bilirubin, total 0.3 0.2 - 1.0 MG/DL    ALT (SGPT) 52 12 - 78 U/L    AST (SGOT) 48 (H) 15 - 37 U/L    Alk. phosphatase 99 45 - 117 U/L    Protein, total 7.5 6.4 - 8.2 g/dL    Albumin 4.0 3.5 - 5.0 g/dL    Globulin 3.5 2.0 - 4.0 g/dL    A-G Ratio 1.1 1.1 - 2.2     MAGNESIUM    Collection Time: 04/25/17  2:03 PM   Result Value Ref Range    Magnesium 2.5 (H) 1.6 - 2.4 mg/dL       1405- patient left [facility] for MD office appointment.   1530- patient returned to [facility] for treatment. Medications:  Normal Saline KVO  Herceptin 415 MG - infused over 30 minutes    1625 Pt tolerated treatment well. Port maintained positive blood return throughout treatment, flushed with positive blood return at conclusion and low needle removed, site covered with 2x2 guaze and band aid. D/c home ambulatory in no distress. Pt aware of next [facility] appointment scheduled for 05/16/17.   Patient Vitals for the past 12 hrs:   Temp Pulse Resp BP   04/25/17 1616 - 78 16 96/66   04/25/17 1356 97 °F (36.1 °C) 82 18 104/74

## 2017-04-25 NOTE — PROGRESS NOTES
HEME/ONC CONSULT       Kelly Whitlock is a 64 y.o. 1960 female and presents with Breast Cancer    CC  Breast cancer 2004    HPI: Ms. Sterling De León is here today for follow-up for stage 4 breast cancer, now on Herceptin. She has completed radiation to her spine cord compression in December 2016. She reports she is feeling well with no new complaints. She denies nausea, vomiting, or diarrhea. She reports generalized aching pain but no back pain specifically. She denies headaches or vision changes. She denies shortness of breath, chest pain, or swelling. She denies numbness or tingling to her arms or legs. DX   Encounter Diagnoses   Name Primary?  Breast cancer, stage 4, unspecified laterality Yes    Bone metastases ([address]     Liver metastases ([address]         STAGE: 4 liver and bones    TREATMENT COURSE: 4/2004, s/p L mastectomy with Dr. Willard Medeiros at [facility]. Stage IIB, T2N1M0, 3/39 LN +, ER negative, KS +, HER 2 +, was enrolled on NSABP B-31 trial with adriamycin/cytoxan q 3 weeks x 4 followed by taxol weekly with herceptin x 12 then completion of a year of herceptin. States she did not receive XRT. Did receive 5 years of tamoxifen from 8423-3014, which she tolerated well. In 2009 she noticed a chest wall mass. 11/23/10 FNA of chest wall mass showed ER + > 90%, KS + > 90%, MIB 50%, HER 2 + by FISH ratio 6.29. S/p BSO 12/27/10. Received zometa from 2010 to 2011. In 2011, she was started on q 3 week trastuzumab and exemestane, but states she took the exemestane sparingly due to joint pains and body aches. Has not taken any AI since 2013. Briefly took anastrozole in 5/2014. Stopped anastrozole 1/1/15 due to continued body and joint aches. Started Herceptin 1/9/17    No past medical history on file.   Past Surgical History:   Procedure Laterality Date    BREAST SURGERY PROCEDURE UNLISTED  2004    mastectomy    HX GYN  2010    ovaries removed     Social History     Social History    Marital status:  Spouse name: N/A    Number of children: N/A    Years of education: N/A     Social History Main Topics    Smoking status: Never Smoker    Smokeless tobacco: Never Used    Alcohol use Yes      Comment: 3-4 drinks/month    Drug use: No    Sexual activity: No     Other Topics Concern    None     Social History Narrative     Family History   Problem Relation Age of Onset    Hypertension Mother     Cancer Father      mesothelioma    Cancer Maternal Grandfather 76     Bladder       Current Outpatient Prescriptions   Medication Sig Dispense Refill    MILK THISTLE, BULK, by Does Not Apply route.  IODINE PO Take  by mouth.  BETA 1,3 GLUCAN, BULK, 3 Tabs by Does Not Apply route daily.  OTHER,NON-FORMULARY, Mushroom extract twice daily; Crocifurous extract      TURMERIC, BULK, by Does Not Apply route daily.  cholecalciferol (VITAMIN D3) 1,000 unit tablet Take 4,000 Units by mouth daily.  multivitamin (ONE A DAY) tablet Take 1 tablet by mouth daily.  coenzyme q10 (CO Q-10) 10 mg cap Take  by mouth daily.  LACTOBAC CMB #3/FOS/PANTETHINE (PROBIOTIC & ACIDOPHILUS PO) Take  by mouth daily.  magnesium 250 mg tab Take 500 mg by mouth daily. Allergies   Allergen Reactions    Pcn [Penicillins] Anaphylaxis    Contrast Agent [Iodine] Shortness of Breath and Itching     Patient reported during phone conversation on 4/18/17.        Review of Systems    A comprehensive review of systems was negative except for: per HPI     Objective:  Visit Vitals    /70    Pulse 76    Temp 97.4 °F (36.3 °C) (Oral)    Resp 16    Ht 5' 6\" (1.676 m)    Wt 149 lb 12.8 oz (67.9 kg)    SpO2 98%    BMI 24.18 kg/m2     Physical Exam:   General appearance - alert, well appearing, and in no distress, oriented to person, place, and time and normal appearing weight  Mental status - alert, oriented to person, place, and time, normal mood, behavior, speech, dress, motor activity, and thought processes  EYE-conj clear  Mouth - mucous membranes moist, pharynx normal without lesions  Neck - supple, no adenopathy appreciated   Chest - clear to auscultation, no wheezes, rales or rhonchi, symmetric air entry   Heart - normal rate and regular rhythm   Abdomen - soft, nontender, nondistended, no masses or organomegaly  Ext-no pedal edema noted  Skin-Warm and dry. Intact without rash. Neuro -alert, oriented, normal speech, no focal findings or movement disorder noted  Right chest wall port site - Currently accessed. No erythema or swelling noted. Diagnostic Imaging   reviewed    Results for orders placed during the hospital encounter of 12/07/16   CT CHEST ABD PELV W CONT    Narrative STUDY: CT CHEST ABD PELV W CONT, ABDOMEN, AND PELVIS WITH CONTRAST  Clinical history: Metastatic breast cancer, evaluate for progression      INDICATION: metastatic breast cancer, evaluate disease status    CONTRAST: 95 mL Isovue-370        COMPARISON: 8/10/2015, 11/10/14    TECHNIQUE:  Computed tomography images of the chest, abdomen, and pelvis were  obtained from the thoracic inlet through the pubic symphysis via spiral  acquisition after the administration of intravenous contrast. Multiplanar  reconstructions were performed in soft tissue and lung windows. FINDINGS:   CHEST:  Thyroid:  Normal in appearance. Mediastinum: There has been revision of a right IJ Port-A-Cath. A Port-A-Cath  tip projects in the right atrium. Normal size heart. No significant pericardial  fluid. Normal-sized main pulmonary artery. No lymphadenopathy. Lungs/pleura: Patent central airways. No airspace disease or pleural effusion. Unchanged nonspecific subcentimeter groundglass opacity left lower lobe . ABDOMEN AND PELVIS:  Liver and gallbladder:           Hepatic hypodensities    Segment 2 2-54 left hepatic lobe 12.8 x 12.8 mm. Right hepatic lobe 2-53 9 x 9 mm. Right hepatic lobe 2-43 8 x 8 mm.   There are additionally 2 tiny hypodensities adjacent to the posterior right  hepatic vein also seen on image 2-53 approximately 4 to 5 mm in size. .       Normal-appearing gallbladder. Spleen, adrenal glands, and pancreas:  Normal in appearance. Kidneys:  Symmetric enhancement. No hydronephrosis. Bowel and peritoneum:  No bowel obstruction. No ascites. Genitourinary:  Normal appearing uterus, adnexa, and bladder. Vasculature and lymph nodes:  Patent vasculature. No lymphadenopathy. Osseous structures the chest, abdomen, and pelvis: Unchanged sclerotic  metastasis with severe pathologic compression fracture at T9. Unchanged  sclerotic metastases seen in the spine. Body wall:  Status post left mastectomy and breast implant placement. Postsurgical changes seen in the left axilla. Impression IMPRESSION:  1. Findings are consistent with interval progression of disease. There are new  hepatic lesions present up to 13 x 13 mm in size. .    2. Unchanged osseous metastases. Lab Results  reviewed  Lab Results   Component Value Date/Time    WBC 5.3 04/25/2017 02:03 PM    HGB 11.8 04/25/2017 02:03 PM    HCT 34.4 04/25/2017 02:03 PM    PLATELET 860 52/28/0079 02:03 PM    MCV 90.3 04/25/2017 02:03 PM       Lab Results   Component Value Date/Time    Sodium 139 04/25/2017 02:03 PM    Potassium 3.8 04/25/2017 02:03 PM    Chloride 105 04/25/2017 02:03 PM    CO2 28 04/25/2017 02:03 PM    Anion gap 6 04/25/2017 02:03 PM    Glucose 105 04/25/2017 02:03 PM    BUN 15 04/25/2017 02:03 PM    Creatinine 0.68 04/25/2017 02:03 PM    BUN/Creatinine ratio 22 04/25/2017 02:03 PM    GFR est AA >60 04/25/2017 02:03 PM    GFR est non-AA >60 04/25/2017 02:03 PM    Calcium 9.0 04/25/2017 02:03 PM    AST (SGOT) 48 04/25/2017 02:03 PM    Alk.  phosphatase 99 04/25/2017 02:03 PM    Protein, total 7.5 04/25/2017 02:03 PM    Albumin 4.0 04/25/2017 02:03 PM    Globulin 3.5 04/25/2017 02:03 PM    A-G Ratio 1.1 04/25/2017 02:03 PM    ALT (SGPT) 52 04/25/2017 02:03 PM       Assessment/Plan:    1. Stage 4 breast cancer ER+ HER2 amplified at recurrence with mets to liver and bones in past.   Patient presented in 12/2016 with worsening back pain. MRI spine showed cord compression. CT's showed worsened bone and liver disease. She completed radiation to the spine. She was only willing to do herceptin and is now receiving Herceptin every 3 weeks. She is tolerating Herceptin well. She declined other options including hormonal therapy. Her last ECHO 2/23/17 showed EF 61% and stable. Next ECHO due by 5/23/17. Ordered today. PET scan scheduled for tomorrow to evaluate disease response to therapy. Pt clinically doing well overall. 2. Back pain. Resolved. No pain at this time. She is s/p radiation to the spine for cord compression. Follow-up in 6 weeks. Seen in conjunction with Mallory Dewey NP. ICD-10-CM ICD-9-CM    1. Breast cancer, stage 4, unspecified laterality C50.919 174.9 2D ECHO COMPLETE ADULT (TTE) W OR WO CONTR   2. Bone metastases (HCC) C79.51 198.5    3. Liver metastases (HCC) C78.7 197.7        Current Outpatient Prescriptions   Medication Sig    MILK THISTLE, BULK, by Does Not Apply route.  IODINE PO Take  by mouth.  BETA 1,3 GLUCAN, BULK, 3 Tabs by Does Not Apply route daily.  OTHER,NON-FORMULARY, Mushroom extract twice daily; Crocifurous extract    TURMERIC, BULK, by Does Not Apply route daily.  cholecalciferol (VITAMIN D3) 1,000 unit tablet Take 4,000 Units by mouth daily.  multivitamin (ONE A DAY) tablet Take 1 tablet by mouth daily.  coenzyme q10 (CO Q-10) 10 mg cap Take  by mouth daily.  LACTOBAC CMB #3/FOS/PANTETHINE (PROBIOTIC & ACIDOPHILUS PO) Take  by mouth daily.  magnesium 250 mg tab Take 500 mg by mouth daily. No current facility-administered medications for this visit.         continue present plan, routine labs ordered, call if any problems  There are no Patient Instructions on file for this visit. Follow-up Disposition:  Return in about 6 weeks (around 6/6/2017).     Frances Reed NP

## 2017-04-26 ENCOUNTER — HOSPITAL ENCOUNTER (OUTPATIENT)
Dept: PET IMAGING | Age: 57
Discharge: HOME | End: 2017-04-26
Attending: NURSE PRACTITIONER
Payer: COMMERCIAL

## 2017-04-26 VITALS — BODY MASS INDEX: 23.39 KG/M2 | WEIGHT: 149 LBS | HEIGHT: 67 IN

## 2017-04-26 DIAGNOSIS — C79.51 BONE METASTASES (HCC): ICD-10-CM

## 2017-04-26 DIAGNOSIS — C50.919 BREAST CANCER, STAGE 4, UNSPECIFIED LATERALITY (HCC): ICD-10-CM

## 2017-04-26 DIAGNOSIS — C78.7 LIVER METASTASES (HCC): ICD-10-CM

## 2017-04-26 LAB
CANCER AG15-3 SERPL-ACNC: 286.4 U/ML (ref 0–25)
CANCER AG27-29 SERPL-ACNC: 287.7 U/ML (ref 0–38.6)

## 2017-04-26 PROCEDURE — A9552 F18 FDG: HCPCS

## 2017-04-26 RX ORDER — SODIUM CHLORIDE 0.9 % (FLUSH) 0.9 %
10 SYRINGE (ML) INJECTION
Status: COMPLETED | OUTPATIENT
Start: 2017-04-26 | End: 2017-04-26

## 2017-04-26 RX ADMIN — Medication 10 ML: at 12:55

## 2017-04-27 ENCOUNTER — TELEPHONE (OUTPATIENT)
Dept: ONCOLOGY | Age: 57
End: 2017-04-27

## 2017-04-27 NOTE — TELEPHONE ENCOUNTER
----- Message from Rosalba Morrow, DO sent at 4/27/2017  8:42 AM EDT -----  Need radiology to give comparison ? Stable ? Worse/ better?

## 2017-04-27 NOTE — TELEPHONE ENCOUNTER
Called and spoke with Dr. Mik Puentes and asked for comparison to be made to CT's from 12/2016. She states it may be difficult to compare since contrast was not used but she will add an addendum with comparison to scans from December.

## 2017-05-01 ENCOUNTER — DOCUMENTATION ONLY (OUTPATIENT)
Dept: ONCOLOGY | Age: 57
End: 2017-05-01

## 2017-05-01 ENCOUNTER — TELEPHONE (OUTPATIENT)
Dept: ONCOLOGY | Age: 57
End: 2017-05-01

## 2017-05-01 NOTE — PROGRESS NOTES
Called pt and discussed PET results with progressive disease  Pt wanted to be called no matter the results  Reviewed herceptin single agent no longer working and need another therapy  Discussed other chemo options such as TCHP/ kadcyla/ tykerb and xeloda.   Pt states she needs to think about whether or not she is willing to do any chemo/ or further therapy  Pt will call us when she wants to come in to discuss  Call and check on pt later this week    Please mail PET to pt

## 2017-05-04 ENCOUNTER — NURSE NAVIGATOR (OUTPATIENT)
Dept: CASE MANAGEMENT | Age: 57
End: 2017-05-04

## 2017-05-04 NOTE — NURSE NAVIGATOR
Daphne Jackson, : 1960    Contacted patient by telephone this morning and left a message regarding ONN services and the [address] Asked patient to return my call if there was anything that we could do to assist her.     Lorena Neumann, RN, BSN, [facility], N    Oncology Nurse Navigator

## 2017-05-10 ENCOUNTER — NURSE NAVIGATOR (OUTPATIENT)
Dept: ONCOLOGY | Age: 57
End: 2017-05-10

## 2017-05-11 ENCOUNTER — DOCUMENTATION ONLY (OUTPATIENT)
Dept: ONCOLOGY | Age: 57
End: 2017-05-11

## 2017-05-11 RX ORDER — SODIUM CHLORIDE 9 MG/ML
25 INJECTION, SOLUTION INTRAVENOUS CONTINUOUS
Status: DISPENSED | OUTPATIENT
Start: 2017-05-16 | End: 2017-05-17

## 2017-05-15 ENCOUNTER — APPOINTMENT (OUTPATIENT)
Dept: INFUSION THERAPY | Age: 57
End: 2017-05-15

## 2017-05-16 ENCOUNTER — HOSPITAL ENCOUNTER (OUTPATIENT)
Dept: INFUSION THERAPY | Age: 57
Discharge: HOME | End: 2017-05-16

## 2017-05-24 ENCOUNTER — DOCUMENTATION ONLY (OUTPATIENT)
Dept: ONCOLOGY | Age: 57
End: 2017-05-24

## 2017-05-24 ENCOUNTER — NURSE NAVIGATOR (OUTPATIENT)
Dept: ONCOLOGY | Age: 57
End: 2017-05-24

## 2017-05-24 ENCOUNTER — TELEPHONE (OUTPATIENT)
Dept: ONCOLOGY | Age: 57
End: 2017-05-24

## 2017-05-24 ENCOUNTER — OFFICE VISIT (OUTPATIENT)
Dept: ONCOLOGY | Age: 57
End: 2017-05-24

## 2017-05-24 VITALS
HEIGHT: 67 IN | WEIGHT: 148.6 LBS | DIASTOLIC BLOOD PRESSURE: 72 MMHG | TEMPERATURE: 97 F | RESPIRATION RATE: 16 BRPM | BODY MASS INDEX: 23.32 KG/M2 | SYSTOLIC BLOOD PRESSURE: 107 MMHG | OXYGEN SATURATION: 99 % | HEART RATE: 76 BPM

## 2017-05-24 DIAGNOSIS — F41.8 ANXIETY ABOUT HEALTH: ICD-10-CM

## 2017-05-24 DIAGNOSIS — C79.51 BONE METASTASES (HCC): ICD-10-CM

## 2017-05-24 DIAGNOSIS — C78.7 LIVER METASTASES (HCC): ICD-10-CM

## 2017-05-24 DIAGNOSIS — Z51.89 ALTERNATIVE MEDICINE: ICD-10-CM

## 2017-05-24 DIAGNOSIS — C50.919 BREAST CANCER, STAGE 4, UNSPECIFIED LATERALITY (HCC): Primary | ICD-10-CM

## 2017-05-24 RX ORDER — ASCORBIC ACID 250 MG
250 TABLET ORAL DAILY
COMMUNITY

## 2017-05-24 NOTE — PROGRESS NOTES
Sergey Nancy, : 1960  Breast cancer stage 4, Bone and liver mets     I met with Pedro Wilson prior to her f/u office visit with Dr. Ryan Bhat today to discuss her treatment options in greater detail. Pedro Wilson expressed interest in obtaining filing for disability information and then she decided to delay implementing the process of filing for her disability. She said that she would contact me when she had made a decision regarding her \"overall plan\" after she met with Dr. Ryan Bhat today. I provided Pedro Wilson with my contact information and reinforced my purpose as an ONN and to call me with concerns. I also provided Pedro Wilson with the [address] [place] [address] brochure and briefly educated the patient about the complimentary services that are offered at  [address] [place] [address]. Patient and I had previously met through a telephone Robina Pimentel on 5/10/17 and she was appreciative of the time that I spent with her this afternoon and would call me with questions or concerns.         Angeli Rolon, RN, BSN, [facility], [facility]    Oncology Nurse Navigator

## 2017-05-24 NOTE — TELEPHONE ENCOUNTER
Call to schedule ECHO/[facility]. Needs prior to starting kadcyla. Spoke with Tiffanie Crenshaw. First available 2 Rosa [facility]rey 6/2/17 @ 2p. No available ECHOs until later next week at [facility] available. Advised would contact patient and advise.

## 2017-05-24 NOTE — PROGRESS NOTES
[facility] info on kadcyla reviewed with patient and . Side effects/symptom management and ECHO monitoring reviewed with patient. Questions answered. Stated needs ECHO rescheduled at [facility] as patient cancelled when previous therapy DCd. Wants to start treatment 5/26/17 @ [facility] @ 1:30p. Advised would attempt to schedule, but not certain if insurance approval could be obtained by Friday. Patient stated any day next week ok, but would like 1:30p time. Consent obtained for palliative therapy and to provider for orders.

## 2017-05-24 NOTE — PROGRESS NOTES
HEME/ONC CONSULT       Sergey Cruz is a 64 y.o. 1960 female and presents with Follow-up and Breast Cancer    CC  Breast cancer 2004    HPI: Ms. Day Funez is here today for follow-up for stage 4 breast cancer ER+ HER2 + off Herceptin due to progressive disease. Pt is here today with family to discuss possible treatment options. Pt has been resistant to chemo due to side effects but is now considering given burden of disease. Has lots of questions regarding possible chemo. Feeling ok overall. Anxious about chemo. Still doing alternative therapies. completed radiation to her spine cord compression in December 2016. DX   Encounter Diagnoses   Name Primary?  Breast cancer, stage 4, unspecified laterality Yes    Bone metastases ([address]     Liver metastases ([address]     Anxiety about health     Alternative medicine         STAGE: 4 liver and bones    TREATMENT COURSE: 4/2004, s/p L mastectomy with Dr. Bazan Friday at [facility]. Stage IIB, T2N1M0, 3/39 LN +, ER negative, NV +, HER 2 +, was enrolled on NSABP B-31 trial with adriamycin/cytoxan q 3 weeks x 4 followed by taxol weekly with herceptin x 12 then completion of a year of herceptin. States she did not receive XRT. Did receive 5 years of tamoxifen from 5125-9452, which she tolerated well. In 2009 she noticed a chest wall mass. 11/23/10 FNA of chest wall mass showed ER + > 90%, NV + > 90%, MIB 50%, HER 2 + by FISH ratio 6.29. S/p BSO 12/27/10. Received zometa from 2010 to 2011. In 2011, she was started on q 3 week trastuzumab and exemestane, but states she took the exemestane sparingly due to joint pains and body aches. Has not taken any AI since 2013. Briefly took anastrozole in 5/2014. Stopped anastrozole 1/1/15 due to continued body and joint aches. Started Herceptin 1/9/17    No past medical history on file.   Past Surgical History:   Procedure Laterality Date    BREAST SURGERY PROCEDURE UNLISTED  2004    mastectomy    HX GYN  2010    ovaries removed     Social History     Social History    Marital status:      Spouse name: N/A    Number of children: N/A    Years of education: N/A     Social History Main Topics    Smoking status: Never Smoker    Smokeless tobacco: Never Used    Alcohol use Yes      Comment: 3-4 drinks/month    Drug use: No    Sexual activity: No     Other Topics Concern    None     Social History Narrative     Family History   Problem Relation Age of Onset    Hypertension Mother     Cancer Father      mesothelioma    Cancer Maternal Grandfather 76     Bladder       Current Outpatient Prescriptions   Medication Sig Dispense Refill    OTHER Indications: Organic Sulfa- Powder mix with liquids  , 4x per day      ascorbic acid, vitamin C, (VITAMIN C) 250 mg tablet Take 250 mg by mouth daily.  MILK THISTLE, BULK, by Does Not Apply route.  IODINE PO Take  by mouth.  BETA 1,3 GLUCAN, BULK, 3 Tabs by Does Not Apply route daily.  OTHER,NON-FORMULARY, Mushroom extract twice daily; Crocifurous extract      TURMERIC, BULK, by Does Not Apply route daily.  cholecalciferol (VITAMIN D3) 1,000 unit tablet Take 4,000 Units by mouth daily.  multivitamin (ONE A DAY) tablet Take 1 tablet by mouth daily.  coenzyme q10 (CO Q-10) 10 mg cap Take  by mouth daily.  LACTOBAC CMB #3/FOS/PANTETHINE (PROBIOTIC & ACIDOPHILUS PO) Take  by mouth daily.  magnesium 250 mg tab Take 500 mg by mouth daily. Allergies   Allergen Reactions    Pcn [Penicillins] Anaphylaxis    Contrast Agent [Iodine] Shortness of Breath and Itching     Patient reported during phone conversation on 4/18/17.        Review of Systems    A comprehensive review of systems was negative except for: per HPI     Objective:  Visit Vitals    /72 (BP 1 Location: Right arm, BP Patient Position: Sitting)    Pulse 76    Temp 97 °F (36.1 °C) (Oral)    Resp 16    Ht 5' 7\" (1.702 m)    Wt 148 lb 9.6 oz (67.4 kg)    SpO2 99%    BMI 23.27 kg/m2     Physical Exam:   General appearance - alert, well appearing, and in no distress, oriented to person, place, and time and normal appearing weight  Mental status - alert, oriented to person, place, and time, normal mood, behavior, speech, dress, motor activity, and thought processes  EYE-conj clear  Mouth - mucous membranes moist  Neck - supple  Chest - clear to auscultation  Heart - normal rate and regular rhythm   Abdomen - soft, nontender  Ext-no pedal edema noted  Skin-Warm and dry. Intact without rash. Neuro -alert, oriented, normal speech, no focal findings or movement disorder noted    Diagnostic Imaging   reviewed    Results for orders placed during the hospital encounter of 12/07/16   CT CHEST ABD PELV W CONT    Narrative STUDY: CT CHEST ABD PELV W CONT, ABDOMEN, AND PELVIS WITH CONTRAST  Clinical history: Metastatic breast cancer, evaluate for progression      INDICATION: metastatic breast cancer, evaluate disease status    CONTRAST: 95 mL Isovue-370        COMPARISON: 8/10/2015, 11/10/14    TECHNIQUE:  Computed tomography images of the chest, abdomen, and pelvis were  obtained from the thoracic inlet through the pubic symphysis via spiral  acquisition after the administration of intravenous contrast. Multiplanar  reconstructions were performed in soft tissue and lung windows. FINDINGS:   CHEST:  Thyroid:  Normal in appearance. Mediastinum: There has been revision of a right IJ Port-A-Cath. A Port-A-Cath  tip projects in the right atrium. Normal size heart. No significant pericardial  fluid. Normal-sized main pulmonary artery. No lymphadenopathy. Lungs/pleura: Patent central airways. No airspace disease or pleural effusion. Unchanged nonspecific subcentimeter groundglass opacity left lower lobe . ABDOMEN AND PELVIS:  Liver and gallbladder:           Hepatic hypodensities    Segment 2 2-54 left hepatic lobe 12.8 x 12.8 mm. Right hepatic lobe 2-53 9 x 9 mm.   Right hepatic lobe 2-43 8 x 8 mm. There are additionally 2 tiny hypodensities adjacent to the posterior right  hepatic vein also seen on image 2-53 approximately 4 to 5 mm in size. .       Normal-appearing gallbladder. Spleen, adrenal glands, and pancreas:  Normal in appearance. Kidneys:  Symmetric enhancement. No hydronephrosis. Bowel and peritoneum:  No bowel obstruction. No ascites. Genitourinary:  Normal appearing uterus, adnexa, and bladder. Vasculature and lymph nodes:  Patent vasculature. No lymphadenopathy. Osseous structures the chest, abdomen, and pelvis: Unchanged sclerotic  metastasis with severe pathologic compression fracture at T9. Unchanged  sclerotic metastases seen in the spine. Body wall:  Status post left mastectomy and breast implant placement. Postsurgical changes seen in the left axilla. Impression IMPRESSION:  1. Findings are consistent with interval progression of disease. There are new  hepatic lesions present up to 13 x 13 mm in size. .    2. Unchanged osseous metastases. Lab Results  reviewed  Lab Results   Component Value Date/Time    WBC 5.3 04/25/2017 02:03 PM    HGB 11.8 04/25/2017 02:03 PM    HCT 34.4 04/25/2017 02:03 PM    PLATELET 945 47/76/4233 02:03 PM    MCV 90.3 04/25/2017 02:03 PM       Lab Results   Component Value Date/Time    Sodium 139 04/25/2017 02:03 PM    Potassium 3.8 04/25/2017 02:03 PM    Chloride 105 04/25/2017 02:03 PM    CO2 28 04/25/2017 02:03 PM    Anion gap 6 04/25/2017 02:03 PM    Glucose 105 04/25/2017 02:03 PM    BUN 15 04/25/2017 02:03 PM    Creatinine 0.68 04/25/2017 02:03 PM    BUN/Creatinine ratio 22 04/25/2017 02:03 PM    GFR est AA >60 04/25/2017 02:03 PM    GFR est non-AA >60 04/25/2017 02:03 PM    Calcium 9.0 04/25/2017 02:03 PM    AST (SGOT) 48 04/25/2017 02:03 PM    Alk.  phosphatase 99 04/25/2017 02:03 PM    Protein, total 7.5 04/25/2017 02:03 PM    Albumin 4.0 04/25/2017 02:03 PM    Globulin 3.5 04/25/2017 02:03 PM    A-G Ratio 1.1 04/25/2017 02:03 PM    ALT (SGPT) 52 04/25/2017 02:03 PM       Assessment/Plan:    1. Stage 4 breast cancer ER+ HER2 amplified at recurrence with mets to liver and bones in past.   Patient presented in 12/2016 with worsening back pain. MRI spine showed cord compression. CT's showed worsened bone and liver disease. She completed radiation to the spine. Pt preferred to do herceptin only and f/u PET on herceptin looks worse with progressive disease in lungs/ liver/ bones. Talked with pt on phone per pt preference prior to this visit today  Reviewed again today herceptin single agent no longer working and need another therapy  Discussed other chemo options such as TCHP/ kadcyla/ tykerb and xeloda. Reviewed overall burden of disease at present and need for chemo for palliation not cure. We discussed chemo side effects overall and treatment regimen options. Pt at present prefers to start kadcyla. Reviewed risks, benefits and alternatives of kadcyla today. Pt and family agreeable to [facility] and will set up asap. Will need ECHO prior to treatment start. Has port. Pt clinically doing well overall. 2. Back pain. Resolved. No pain at this time. She is s/p radiation to the spine for cord compression. 3.  Anxiety about health. Support good today with family. Will get navigator also during chemo. Call if questions. Follow-up in 4 weeks. ICD-10-CM ICD-9-CM    1. Breast cancer, stage 4, unspecified laterality C50.919 174.9    2. Bone metastases (HCC) C79.51 198.5    3. Liver metastases (HCC) C78.7 197.7    4. Anxiety about health F41.8 300.09    5. Alternative medicine Z51.89 V58.9        Current Outpatient Prescriptions   Medication Sig    OTHER Indications: Organic Sulfa- Powder mix with liquids  , 4x per day    ascorbic acid, vitamin C, (VITAMIN C) 250 mg tablet Take 250 mg by mouth daily.  MILK THISTLE, BULK, by Does Not Apply route.  IODINE PO Take  by mouth.     BETA 1,3 GLUCAN, BULK, 3 Tabs by Does Not Apply route daily.  OTHER,NON-FORMULARY, Mushroom extract twice daily; Crocifurous extract    TURMERIC, BULK, by Does Not Apply route daily.  cholecalciferol (VITAMIN D3) 1,000 unit tablet Take 4,000 Units by mouth daily.  multivitamin (ONE A DAY) tablet Take 1 tablet by mouth daily.  coenzyme q10 (CO Q-10) 10 mg cap Take  by mouth daily.  LACTOBAC CMB #3/FOS/PANTETHINE (PROBIOTIC & ACIDOPHILUS PO) Take  by mouth daily.  magnesium 250 mg tab Take 500 mg by mouth daily. No current facility-administered medications for this visit. continue present plan, routine labs ordered, call if any problems  There are no Patient Instructions on file for this visit. Follow-up Disposition:  Return in about 4 weeks (around 6/21/2017).     Mayur Rodriguez DO

## 2017-05-25 NOTE — TELEPHONE ENCOUNTER
HIPAA verified. Advised of availability of ECHO appt end of next week. Stated could have ECHO done first thing in the morning or after 1p. Spoke with Lakisha/[employer] scheduling. Appt at [facility] 5/26/17 @ 10a OP Registration. Call from Lakisha/[employer] scheduling. Stated patient has auth under [employer] for ECHO and must be released to schedule @ [facility]. Will need call back to confirm auth withdrawn. Call to [employer]/Marysol/[phone]. Transferred to [employer]. Will contact Eduardo Monday and return call to [employer].

## 2017-05-25 NOTE — TELEPHONE ENCOUNTER
Chemo orders for [facility] faxed complete to [address] for next available appt at 1:30p patient request.

## 2017-05-25 NOTE — TELEPHONE ENCOUNTER
Call returned from Lakisha/[facility] for additional clinical documentation. Info supplied and auth approved.       ( Total time:  33 min)

## 2017-05-25 NOTE — TELEPHONE ENCOUNTER
Call to patient. HIPAA verified. Advised of ECHO 5/26/17 @ 10a and that orders would be faxed to [address] [place] [address] for next available appt. Verbalized understanding and thanked for call.

## 2017-05-26 ENCOUNTER — HOSPITAL ENCOUNTER (OUTPATIENT)
Dept: NON INVASIVE DIAGNOSTICS | Age: 57
Discharge: HOME | End: 2017-05-26
Attending: NURSE PRACTITIONER
Payer: COMMERCIAL

## 2017-05-26 DIAGNOSIS — C50.919 BREAST CANCER, STAGE 4, UNSPECIFIED LATERALITY (HCC): ICD-10-CM

## 2017-05-26 PROCEDURE — 93306 TTE W/DOPPLER COMPLETE: CPT

## 2017-05-29 PROBLEM — Z51.89 ALTERNATIVE MEDICINE: Status: ACTIVE | Noted: 2017-05-29

## 2017-05-29 PROBLEM — F41.8 ANXIETY ABOUT HEALTH: Status: ACTIVE | Noted: 2017-05-29

## 2017-05-30 ENCOUNTER — DOCUMENTATION ONLY (OUTPATIENT)
Dept: ONCOLOGY | Age: 57
End: 2017-05-30

## 2017-05-31 RX ORDER — DEXAMETHASONE SODIUM PHOSPHATE 4 MG/ML
8 INJECTION, SOLUTION INTRA-ARTICULAR; INTRALESIONAL; INTRAMUSCULAR; INTRAVENOUS; SOFT TISSUE ONCE
Status: COMPLETED | OUTPATIENT
Start: 2017-06-01 | End: 2017-06-01

## 2017-05-31 RX ORDER — PROCHLORPERAZINE EDISYLATE 5 MG/ML
10 INJECTION INTRAMUSCULAR; INTRAVENOUS
Status: ACTIVE | OUTPATIENT
Start: 2017-06-01 | End: 2017-06-02

## 2017-05-31 RX ORDER — SODIUM CHLORIDE 9 MG/ML
25 INJECTION, SOLUTION INTRAVENOUS CONTINUOUS
Status: DISPENSED | OUTPATIENT
Start: 2017-06-01 | End: 2017-06-02

## 2017-05-31 NOTE — PROGRESS NOTES
Call to patient. HIPAA verified. Advised of ECHO results. Reviewed kadcyla infusion times. Verbalized understanding and thanked for call.

## 2017-06-01 ENCOUNTER — HOSPITAL ENCOUNTER (OUTPATIENT)
Dept: INFUSION THERAPY | Age: 57
Discharge: HOME | End: 2017-06-01
Payer: COMMERCIAL

## 2017-06-01 VITALS
DIASTOLIC BLOOD PRESSURE: 66 MMHG | BODY MASS INDEX: 23.2 KG/M2 | HEIGHT: 67 IN | SYSTOLIC BLOOD PRESSURE: 100 MMHG | RESPIRATION RATE: 18 BRPM | OXYGEN SATURATION: 99 % | TEMPERATURE: 97 F | WEIGHT: 147.8 LBS | HEART RATE: 79 BPM

## 2017-06-01 LAB
ALBUMIN SERPL BCP-MCNC: 3.6 G/DL (ref 3.5–5)
ALBUMIN/GLOB SERPL: 0.9 {RATIO} (ref 1.1–2.2)
ALP SERPL-CCNC: 155 U/L (ref 45–117)
ALT SERPL-CCNC: 95 U/L (ref 12–78)
ANION GAP BLD CALC-SCNC: 9 MMOL/L (ref 5–15)
AST SERPL W P-5'-P-CCNC: 86 U/L (ref 15–37)
BASO+EOS+MONOS # BLD AUTO: 0.5 K/UL (ref 0.2–1.2)
BASO+EOS+MONOS # BLD AUTO: 12 % (ref 3.2–16.9)
BILIRUB SERPL-MCNC: 0.2 MG/DL (ref 0.2–1)
BUN SERPL-MCNC: 16 MG/DL (ref 6–20)
BUN/CREAT SERPL: 22 (ref 12–20)
CALCIUM SERPL-MCNC: 8.8 MG/DL (ref 8.5–10.1)
CHLORIDE SERPL-SCNC: 103 MMOL/L (ref 97–108)
CO2 SERPL-SCNC: 28 MMOL/L (ref 21–32)
CREAT SERPL-MCNC: 0.72 MG/DL (ref 0.55–1.02)
DIFFERENTIAL METHOD BLD: ABNORMAL
ERYTHROCYTE [DISTWIDTH] IN BLOOD BY AUTOMATED COUNT: 12.8 % (ref 11.8–15.8)
GLOBULIN SER CALC-MCNC: 3.8 G/DL (ref 2–4)
GLUCOSE SERPL-MCNC: 115 MG/DL (ref 65–100)
HCT VFR BLD AUTO: 34 % (ref 35–47)
HGB BLD-MCNC: 11.7 G/DL (ref 11.5–16)
LYMPHOCYTES # BLD AUTO: 20 % (ref 12–49)
LYMPHOCYTES # BLD: 0.9 K/UL (ref 0.8–3.5)
MCH RBC QN AUTO: 32 PG (ref 26–34)
MCHC RBC AUTO-ENTMCNC: 34.4 G/DL (ref 30–36.5)
MCV RBC AUTO: 92.9 FL (ref 80–99)
NEUTS SEG # BLD: 2.8 K/UL (ref 1.8–8)
NEUTS SEG NFR BLD AUTO: 67 % (ref 32–75)
PLATELET # BLD AUTO: 302 K/UL (ref 150–400)
POTASSIUM SERPL-SCNC: 4 MMOL/L (ref 3.5–5.1)
PROT SERPL-MCNC: 7.4 G/DL (ref 6.4–8.2)
RBC # BLD AUTO: 3.66 M/UL (ref 3.8–5.2)
SODIUM SERPL-SCNC: 140 MMOL/L (ref 136–145)
WBC # BLD AUTO: 4.2 K/UL (ref 3.6–11)

## 2017-06-01 PROCEDURE — 74011250636 HC RX REV CODE- 250/636: Performed by: INTERNAL MEDICINE

## 2017-06-01 PROCEDURE — 86300 IMMUNOASSAY TUMOR CA 15-3: CPT | Performed by: INTERNAL MEDICINE

## 2017-06-01 PROCEDURE — 74011250636 HC RX REV CODE- 250/636

## 2017-06-01 PROCEDURE — 80053 COMPREHEN METABOLIC PANEL: CPT | Performed by: INTERNAL MEDICINE

## 2017-06-01 PROCEDURE — 36415 COLL VENOUS BLD VENIPUNCTURE: CPT | Performed by: INTERNAL MEDICINE

## 2017-06-01 PROCEDURE — 85025 COMPLETE CBC W/AUTO DIFF WBC: CPT | Performed by: INTERNAL MEDICINE

## 2017-06-01 RX ORDER — SODIUM CHLORIDE 9 MG/ML
10 INJECTION INTRAMUSCULAR; INTRAVENOUS; SUBCUTANEOUS AS NEEDED
Status: ACTIVE | OUTPATIENT
Start: 2017-06-01 | End: 2017-06-02

## 2017-06-01 RX ORDER — HEPARIN 100 UNIT/ML
500 SYRINGE INTRAVENOUS AS NEEDED
Status: ACTIVE | OUTPATIENT
Start: 2017-06-01 | End: 2017-06-02

## 2017-06-01 RX ORDER — SODIUM CHLORIDE 0.9 % (FLUSH) 0.9 %
10-40 SYRINGE (ML) INJECTION AS NEEDED
Status: ACTIVE | OUTPATIENT
Start: 2017-06-01 | End: 2017-06-02

## 2017-06-01 RX ADMIN — ADO-TRASTUZUMAB EMTANSINE 240 MG: 20 INJECTION, POWDER, LYOPHILIZED, FOR SOLUTION INTRAVENOUS at 15:20

## 2017-06-01 RX ADMIN — SODIUM CHLORIDE 25 ML/HR: 900 INJECTION, SOLUTION INTRAVENOUS at 14:10

## 2017-06-01 RX ADMIN — DEXAMETHASONE SODIUM PHOSPHATE 8 MG: 4 INJECTION, SOLUTION INTRA-ARTICULAR; INTRALESIONAL; INTRAMUSCULAR; INTRAVENOUS; SOFT TISSUE at 14:08

## 2017-06-01 NOTE — PROGRESS NOTES

## 2017-06-01 NOTE — PROGRESS NOTES
[facility] VISIT NOTE    1300  Pt arrived at [facility] ambulatory and in no distress for C1 Kadcyla. Assessment completed, pt has no concerns. Chemotherapy Flowsheet 6/1/2017   Cycle C1   Date 6/1/2017   Drug / Regimen Ado-trastuzumab       RIght chest port accessed with 0.75  in low with no difficulty. Positive blood return noted and labs drawn. Recent Results (from the past 12 hour(s))   METABOLIC PANEL, COMPREHENSIVE    Collection Time: 06/01/17  1:48 PM   Result Value Ref Range    Sodium 140 136 - 145 mmol/L    Potassium 4.0 3.5 - 5.1 mmol/L    Chloride 103 97 - 108 mmol/L    CO2 28 21 - 32 mmol/L    Anion gap 9 5 - 15 mmol/L    Glucose 115 (H) 65 - 100 mg/dL    BUN 16 6 - 20 MG/DL    Creatinine 0.72 0.55 - 1.02 MG/DL    BUN/Creatinine ratio 22 (H) 12 - 20      GFR est AA >60 >60 ml/min/1.73m2    GFR est non-AA >60 >60 ml/min/1.73m2    Calcium 8.8 8.5 - 10.1 MG/DL    Bilirubin, total 0.2 0.2 - 1.0 MG/DL    ALT (SGPT) 95 (H) 12 - 78 U/L    AST (SGOT) 86 (H) 15 - 37 U/L    Alk. phosphatase 155 (H) 45 - 117 U/L    Protein, total 7.4 6.4 - 8.2 g/dL    Albumin 3.6 3.5 - 5.0 g/dL    Globulin 3.8 2.0 - 4.0 g/dL    A-G Ratio 0.9 (L) 1.1 - 2.2     CBC WITH 3 PART DIFF    Collection Time: 06/01/17  1:48 PM   Result Value Ref Range    WBC 4.2 3.6 - 11.0 K/uL    RBC 3.66 (L) 3.80 - 5.20 M/uL    HGB 11.7 11.5 - 16.0 g/dL    HCT 34.0 (L) 35.0 - 47.0 %    MCV 92.9 80.0 - 99.0 FL    MCH 32.0 26.0 - 34.0 PG    MCHC 34.4 30.0 - 36.5 g/dL    RDW 12.8 11.8 - 15.8 %    PLATELET 834 079 - 216 K/uL    NEUTROPHILS 67 32 - 75 %    MIXED CELLS 12 3.2 - 16.9 %    LYMPHOCYTES 20 12 - 49 %    ABS. NEUTROPHILS 2.8 1.8 - 8.0 K/UL    ABS. MIXED CELLS 0.5 0.2 - 1.2 K/uL    ABS. LYMPHOCYTES 0.9 0.8 - 3.5 K/UL    DF AUTOMATED       Medications received:  Dexamethasone IV  Kadcyla IV    Tolerated treatment well, no adverse reaction noted. Port de-accessed and flushed per protocol. Positive blood return noted.   Patient Vitals for the past 12 hrs:   Temp Pulse Resp BP SpO2   06/01/17 1651 97 °F (36.1 °C) 79 18 100/66 -   06/01/17 1328 97 °F (36.1 °C) 79 18 104/66 99 %     1700  D/C'd from [facility] ambulatory and in no distress accompanied by daughter .

## 2017-06-02 ENCOUNTER — TELEPHONE (OUTPATIENT)
Dept: ONCOLOGY | Age: 57
End: 2017-06-02

## 2017-06-02 NOTE — TELEPHONE ENCOUNTER
Patient is calling to verify that her follow up appointments are correct. Does not believe she should be coming in on 6.5. 17. Please advise.

## 2017-06-02 NOTE — TELEPHONE ENCOUNTER
Patient to be seen at [facility] for every 3 week kadcyla-1st dose 6/1/17. Call to cell #.  [phone] that next appt here due on or around 6/22/17 as having chemo at [facility].

## 2017-06-03 LAB
CANCER AG15-3 SERPL-ACNC: 636.6 U/ML (ref 0–25)
CANCER AG27-29 SERPL-ACNC: 707.5 U/ML (ref 0–38.6)

## 2017-06-05 ENCOUNTER — APPOINTMENT (OUTPATIENT)
Dept: INFUSION THERAPY | Age: 57
End: 2017-06-05

## 2017-06-06 ENCOUNTER — APPOINTMENT (OUTPATIENT)
Dept: INFUSION THERAPY | Age: 57
End: 2017-06-06
Payer: COMMERCIAL

## 2017-06-19 ENCOUNTER — OFFICE VISIT (OUTPATIENT)
Dept: ONCOLOGY | Age: 57
End: 2017-06-19

## 2017-06-19 VITALS
SYSTOLIC BLOOD PRESSURE: 104 MMHG | HEART RATE: 82 BPM | BODY MASS INDEX: 24.14 KG/M2 | HEIGHT: 66 IN | WEIGHT: 150.2 LBS | DIASTOLIC BLOOD PRESSURE: 71 MMHG | OXYGEN SATURATION: 98 % | RESPIRATION RATE: 16 BRPM

## 2017-06-19 DIAGNOSIS — R53.83 FATIGUE, UNSPECIFIED TYPE: ICD-10-CM

## 2017-06-19 DIAGNOSIS — C50.919 BREAST CANCER, STAGE 4, UNSPECIFIED LATERALITY (HCC): Primary | ICD-10-CM

## 2017-06-19 DIAGNOSIS — C79.51 BONE METASTASES (HCC): ICD-10-CM

## 2017-06-19 DIAGNOSIS — C78.7 LIVER METASTASES (HCC): ICD-10-CM

## 2017-06-19 RX ORDER — PROCHLORPERAZINE EDISYLATE 5 MG/ML
10 INJECTION INTRAMUSCULAR; INTRAVENOUS
Status: DISCONTINUED | OUTPATIENT
Start: 2017-06-22 | End: 2017-06-21

## 2017-06-19 RX ORDER — DEXAMETHASONE SODIUM PHOSPHATE 4 MG/ML
8 INJECTION, SOLUTION INTRA-ARTICULAR; INTRALESIONAL; INTRAMUSCULAR; INTRAVENOUS; SOFT TISSUE ONCE
Status: DISCONTINUED | OUTPATIENT
Start: 2017-06-22 | End: 2017-06-21

## 2017-06-19 RX ORDER — SODIUM CHLORIDE 9 MG/ML
25 INJECTION, SOLUTION INTRAVENOUS CONTINUOUS
Status: DISCONTINUED | OUTPATIENT
Start: 2017-06-22 | End: 2017-06-21

## 2017-06-21 RX ORDER — DEXAMETHASONE SODIUM PHOSPHATE 4 MG/ML
8 INJECTION, SOLUTION INTRA-ARTICULAR; INTRALESIONAL; INTRAMUSCULAR; INTRAVENOUS; SOFT TISSUE ONCE
Status: COMPLETED | OUTPATIENT
Start: 2017-06-26 | End: 2017-06-26

## 2017-06-21 RX ORDER — SODIUM CHLORIDE 9 MG/ML
25 INJECTION, SOLUTION INTRAVENOUS CONTINUOUS
Status: DISPENSED | OUTPATIENT
Start: 2017-06-26 | End: 2017-06-26

## 2017-06-21 RX ORDER — PROCHLORPERAZINE EDISYLATE 5 MG/ML
10 INJECTION INTRAMUSCULAR; INTRAVENOUS
Status: ACTIVE | OUTPATIENT
Start: 2017-06-26 | End: 2017-06-26

## 2017-06-22 ENCOUNTER — HOSPITAL ENCOUNTER (OUTPATIENT)
Dept: INFUSION THERAPY | Age: 57
End: 2017-06-22
Payer: COMMERCIAL

## 2017-06-26 ENCOUNTER — HOSPITAL ENCOUNTER (OUTPATIENT)
Dept: INFUSION THERAPY | Age: 57
Discharge: HOME | End: 2017-06-26
Payer: COMMERCIAL

## 2017-06-26 ENCOUNTER — APPOINTMENT (OUTPATIENT)
Dept: INFUSION THERAPY | Age: 57
End: 2017-06-26

## 2017-06-26 ENCOUNTER — DOCUMENTATION ONLY (OUTPATIENT)
Dept: ONCOLOGY | Age: 57
End: 2017-06-26

## 2017-06-26 VITALS
DIASTOLIC BLOOD PRESSURE: 76 MMHG | HEART RATE: 71 BPM | BODY MASS INDEX: 23.07 KG/M2 | TEMPERATURE: 97.6 F | SYSTOLIC BLOOD PRESSURE: 111 MMHG | HEIGHT: 67 IN | WEIGHT: 147 LBS | RESPIRATION RATE: 18 BRPM

## 2017-06-26 LAB
ALBUMIN SERPL BCP-MCNC: 3.5 G/DL (ref 3.5–5)
ALBUMIN/GLOB SERPL: 1 {RATIO} (ref 1.1–2.2)
ALP SERPL-CCNC: 203 U/L (ref 45–117)
ALT SERPL-CCNC: 203 U/L (ref 12–78)
ANION GAP BLD CALC-SCNC: 7 MMOL/L (ref 5–15)
AST SERPL W P-5'-P-CCNC: 110 U/L (ref 15–37)
BASOPHILS # BLD AUTO: 0 K/UL (ref 0–0.1)
BASOPHILS # BLD: 1 % (ref 0–1)
BILIRUB SERPL-MCNC: 0.3 MG/DL (ref 0.2–1)
BUN SERPL-MCNC: 15 MG/DL (ref 6–20)
BUN/CREAT SERPL: 23 (ref 12–20)
CALCIUM SERPL-MCNC: 9.2 MG/DL (ref 8.5–10.1)
CHLORIDE SERPL-SCNC: 106 MMOL/L (ref 97–108)
CO2 SERPL-SCNC: 29 MMOL/L (ref 21–32)
CREAT SERPL-MCNC: 0.66 MG/DL (ref 0.55–1.02)
EOSINOPHIL # BLD: 0.1 K/UL (ref 0–0.4)
EOSINOPHIL NFR BLD: 3 % (ref 0–7)
ERYTHROCYTE [DISTWIDTH] IN BLOOD BY AUTOMATED COUNT: 13.4 % (ref 11.5–14.5)
GLOBULIN SER CALC-MCNC: 3.6 G/DL (ref 2–4)
GLUCOSE SERPL-MCNC: 80 MG/DL (ref 65–100)
HCT VFR BLD AUTO: 35.4 % (ref 35–47)
HGB BLD-MCNC: 11.9 G/DL (ref 11.5–16)
LYMPHOCYTES # BLD AUTO: 32 % (ref 12–49)
LYMPHOCYTES # BLD: 1.5 K/UL (ref 0.8–3.5)
MCH RBC QN AUTO: 30.4 PG (ref 26–34)
MCHC RBC AUTO-ENTMCNC: 33.6 G/DL (ref 30–36.5)
MCV RBC AUTO: 90.5 FL (ref 80–99)
MONOCYTES # BLD: 0.5 K/UL (ref 0–1)
MONOCYTES NFR BLD AUTO: 10 % (ref 5–13)
NEUTS SEG # BLD: 2.5 K/UL (ref 1.8–8)
NEUTS SEG NFR BLD AUTO: 54 % (ref 32–75)
PLATELET # BLD AUTO: 339 K/UL (ref 150–400)
POTASSIUM SERPL-SCNC: 4.1 MMOL/L (ref 3.5–5.1)
PROT SERPL-MCNC: 7.1 G/DL (ref 6.4–8.2)
RBC # BLD AUTO: 3.91 M/UL (ref 3.8–5.2)
SODIUM SERPL-SCNC: 142 MMOL/L (ref 136–145)
WBC # BLD AUTO: 4.7 K/UL (ref 3.6–11)

## 2017-06-26 PROCEDURE — 36415 COLL VENOUS BLD VENIPUNCTURE: CPT | Performed by: INTERNAL MEDICINE

## 2017-06-26 PROCEDURE — 80053 COMPREHEN METABOLIC PANEL: CPT | Performed by: INTERNAL MEDICINE

## 2017-06-26 PROCEDURE — 74011250636 HC RX REV CODE- 250/636: Performed by: INTERNAL MEDICINE

## 2017-06-26 PROCEDURE — 96375 TX/PRO/DX INJ NEW DRUG ADDON: CPT

## 2017-06-26 PROCEDURE — 96413 CHEMO IV INFUSION 1 HR: CPT

## 2017-06-26 PROCEDURE — 74011250636 HC RX REV CODE- 250/636

## 2017-06-26 PROCEDURE — 74011000250 HC RX REV CODE- 250: Performed by: INTERNAL MEDICINE

## 2017-06-26 PROCEDURE — 85025 COMPLETE CBC W/AUTO DIFF WBC: CPT | Performed by: INTERNAL MEDICINE

## 2017-06-26 RX ORDER — SODIUM CHLORIDE 0.9 % (FLUSH) 0.9 %
10 SYRINGE (ML) INJECTION AS NEEDED
Status: ACTIVE | OUTPATIENT
Start: 2017-06-26 | End: 2017-06-27

## 2017-06-26 RX ORDER — SODIUM CHLORIDE 9 MG/ML
10 INJECTION INTRAMUSCULAR; INTRAVENOUS; SUBCUTANEOUS AS NEEDED
Status: ACTIVE | OUTPATIENT
Start: 2017-06-26 | End: 2017-06-27

## 2017-06-26 RX ORDER — HEPARIN 100 UNIT/ML
500 SYRINGE INTRAVENOUS AS NEEDED
Status: ACTIVE | OUTPATIENT
Start: 2017-06-26 | End: 2017-06-27

## 2017-06-26 RX ADMIN — Medication 10 ML: at 08:05

## 2017-06-26 RX ADMIN — ADO-TRASTUZUMAB EMTANSINE 200 MG: 20 INJECTION, POWDER, LYOPHILIZED, FOR SOLUTION INTRAVENOUS at 10:30

## 2017-06-26 RX ADMIN — DEXAMETHASONE SODIUM PHOSPHATE 8 MG: 4 INJECTION, SOLUTION INTRA-ARTICULAR; INTRALESIONAL; INTRAMUSCULAR; INTRAVENOUS; SOFT TISSUE at 09:16

## 2017-06-26 RX ADMIN — HEPARIN 500 UNITS: 100 SYRINGE at 11:05

## 2017-06-26 RX ADMIN — SODIUM CHLORIDE 25 ML/HR: 900 INJECTION, SOLUTION INTRAVENOUS at 09:16

## 2017-06-26 RX ADMIN — SODIUM CHLORIDE 10 ML: 9 INJECTION INTRAMUSCULAR; INTRAVENOUS; SUBCUTANEOUS at 08:05

## 2017-06-26 RX ADMIN — Medication 10 ML: at 11:05

## 2017-06-26 NOTE — PROGRESS NOTES
[facility] VISIT NOTE    0730  Pt arrived at [facility] ambulatory and in no distress for C2 of Kadcyla. Assessment completed, pt c/o increased fatigue. States per MD she is to have a dose reduction. Spoke with Rangel Luis at MD office and states they will send new orders. Right chest port accessed with 0.75 in low with no difficulty. Positive blood return noted and labs drawn. Recent Results (from the past 12 hour(s))   METABOLIC PANEL, COMPREHENSIVE    Collection Time: 06/26/17  7:47 AM   Result Value Ref Range    Sodium 142 136 - 145 mmol/L    Potassium 4.1 3.5 - 5.1 mmol/L    Chloride 106 97 - 108 mmol/L    CO2 29 21 - 32 mmol/L    Anion gap 7 5 - 15 mmol/L    Glucose 80 65 - 100 mg/dL    BUN 15 6 - 20 MG/DL    Creatinine 0.66 0.55 - 1.02 MG/DL    BUN/Creatinine ratio 23 (H) 12 - 20      GFR est AA >60 >60 ml/min/1.73m2    GFR est non-AA >60 >60 ml/min/1.73m2    Calcium 9.2 8.5 - 10.1 MG/DL    Bilirubin, total 0.3 0.2 - 1.0 MG/DL    ALT (SGPT) 203 (H) 12 - 78 U/L    AST (SGOT) 110 (H) 15 - 37 U/L    Alk. phosphatase 203 (H) 45 - 117 U/L    Protein, total 7.1 6.4 - 8.2 g/dL    Albumin 3.5 3.5 - 5.0 g/dL    Globulin 3.6 2.0 - 4.0 g/dL    A-G Ratio 1.0 (L) 1.1 - 2.2     CBC WITH AUTOMATED DIFF    Collection Time: 06/26/17  7:53 AM   Result Value Ref Range    WBC 4.7 3.6 - 11.0 K/uL    RBC 3.91 3.80 - 5.20 M/uL    HGB 11.9 11.5 - 16.0 g/dL    HCT 35.4 35.0 - 47.0 %    MCV 90.5 80.0 - 99.0 FL    MCH 30.4 26.0 - 34.0 PG    MCHC 33.6 30.0 - 36.5 g/dL    RDW 13.4 11.5 - 14.5 %    PLATELET 063 370 - 738 K/uL    NEUTROPHILS 54 32 - 75 %    LYMPHOCYTES 32 12 - 49 %    MONOCYTES 10 5 - 13 %    EOSINOPHILS 3 0 - 7 %    BASOPHILS 1 0 - 1 %    ABS. NEUTROPHILS 2.5 1.8 - 8.0 K/UL    ABS. LYMPHOCYTES 1.5 0.8 - 3.5 K/UL    ABS. MONOCYTES 0.5 0.0 - 1.0 K/UL    ABS. EOSINOPHILS 0.1 0.0 - 0.4 K/UL    ABS. BASOPHILS 0.0 0.0 - 0.1 K/UL   Notified Rangel Luis of pt's increased ALT and AST. No new orders received.     Medications received:  NS at [address] [place] [address] IVP  Kadcyla IV    Patient Vitals for the past 12 hrs:   Temp Pulse Resp BP   06/26/17 1105 97.6 °F (36.4 °C) 71 18 111/76   06/26/17 0737 97.7 °F (36.5 °C) 85 18 99/67     Tolerated treatment well, no adverse reaction noted. Port de-accessed and flushed per protocol. Positive blood return noted. 1110  D/C'd from [facility] ambulatory and in no distress accompanied by family. Next appointment is 7/17/17 at 0730.

## 2017-06-26 NOTE — PROGRESS NOTES
Kadcyla reduced to next dose level of 3 mg/kg. Orders to Dr. Lorelei Peñaloza to review and sign. Reviewed labs. AST and ALT are elevated. Reviewed medication guidelines. For Grade 2 ALT, AST elevations (>2.5 to ?5 times ULN), recommendation is to continue with same dose. Will continue with planned reduction of 3 mg/kg.

## 2017-06-27 ENCOUNTER — APPOINTMENT (OUTPATIENT)
Dept: INFUSION THERAPY | Age: 57
End: 2017-06-27
Payer: COMMERCIAL

## 2017-07-11 RX ORDER — DEXAMETHASONE SODIUM PHOSPHATE 4 MG/ML
8 INJECTION, SOLUTION INTRA-ARTICULAR; INTRALESIONAL; INTRAMUSCULAR; INTRAVENOUS; SOFT TISSUE ONCE
Status: COMPLETED | OUTPATIENT
Start: 2017-07-17 | End: 2017-07-17

## 2017-07-11 RX ORDER — PROCHLORPERAZINE EDISYLATE 5 MG/ML
10 INJECTION INTRAMUSCULAR; INTRAVENOUS
Status: ACTIVE | OUTPATIENT
Start: 2017-07-17 | End: 2017-07-17

## 2017-07-11 RX ORDER — SODIUM CHLORIDE 9 MG/ML
25 INJECTION, SOLUTION INTRAVENOUS CONTINUOUS
Status: DISPENSED | OUTPATIENT
Start: 2017-07-17 | End: 2017-07-17

## 2017-07-12 ENCOUNTER — DOCUMENTATION ONLY (OUTPATIENT)
Dept: ONCOLOGY | Age: 57
End: 2017-07-12

## 2017-07-13 ENCOUNTER — TELEPHONE (OUTPATIENT)
Dept: ONCOLOGY | Age: 57
End: 2017-07-13

## 2017-07-13 DIAGNOSIS — C50.919 BREAST CANCER, STAGE 4, UNSPECIFIED LATERALITY (HCC): Primary | ICD-10-CM

## 2017-07-13 DIAGNOSIS — Z79.899 ENCOUNTER FOR MONITORING CARDIOTOXIC DRUG THERAPY: ICD-10-CM

## 2017-07-13 DIAGNOSIS — Z51.81 ENCOUNTER FOR MONITORING CARDIOTOXIC DRUG THERAPY: ICD-10-CM

## 2017-07-13 NOTE — TELEPHONE ENCOUNTER
Call to patient re:  Need for ECHO by 8/26/17. Patient stated any morning good at [facility]. Stated wants to continue dose reduced kadcyla. Also does not want to take steroid premed with infusion as makes her \"too edgy\". Reviewed rationale for steroids (ie prevent reaction)  Wanted to know if could take benadryl instead of decadron. Advised would forward to provider for review. Call to [facility]. ECHO 8/2/17 @ [facility] [address] [facility] [address]. Call to patient and advised of above. Verbalized understanding and thanked for call.

## 2017-07-17 ENCOUNTER — TELEPHONE (OUTPATIENT)
Dept: ONCOLOGY | Age: 57
End: 2017-07-17

## 2017-07-17 ENCOUNTER — APPOINTMENT (OUTPATIENT)
Dept: INFUSION THERAPY | Age: 57
End: 2017-07-17

## 2017-07-17 ENCOUNTER — HOSPITAL ENCOUNTER (OUTPATIENT)
Dept: INFUSION THERAPY | Age: 57
Discharge: HOME | End: 2017-07-17
Payer: COMMERCIAL

## 2017-07-17 VITALS
TEMPERATURE: 98.8 F | RESPIRATION RATE: 18 BRPM | BODY MASS INDEX: 23.12 KG/M2 | SYSTOLIC BLOOD PRESSURE: 104 MMHG | WEIGHT: 147.3 LBS | DIASTOLIC BLOOD PRESSURE: 60 MMHG | HEART RATE: 80 BPM

## 2017-07-17 LAB
ALBUMIN SERPL BCP-MCNC: 3.5 G/DL (ref 3.5–5)
ALBUMIN/GLOB SERPL: 0.9 {RATIO} (ref 1.1–2.2)
ALP SERPL-CCNC: 144 U/L (ref 45–117)
ALT SERPL-CCNC: 124 U/L (ref 12–78)
ANION GAP BLD CALC-SCNC: 7 MMOL/L (ref 5–15)
AST SERPL W P-5'-P-CCNC: 78 U/L (ref 15–37)
BASOPHILS # BLD AUTO: 0 K/UL (ref 0–0.1)
BASOPHILS # BLD: 1 % (ref 0–1)
BILIRUB SERPL-MCNC: 0.3 MG/DL (ref 0.2–1)
BUN SERPL-MCNC: 15 MG/DL (ref 6–20)
BUN/CREAT SERPL: 19 (ref 12–20)
CALCIUM SERPL-MCNC: 9.2 MG/DL (ref 8.5–10.1)
CHLORIDE SERPL-SCNC: 106 MMOL/L (ref 97–108)
CO2 SERPL-SCNC: 28 MMOL/L (ref 21–32)
CREAT SERPL-MCNC: 0.77 MG/DL (ref 0.55–1.02)
EOSINOPHIL # BLD: 0.1 K/UL (ref 0–0.4)
EOSINOPHIL NFR BLD: 2 % (ref 0–7)
ERYTHROCYTE [DISTWIDTH] IN BLOOD BY AUTOMATED COUNT: 13.3 % (ref 11.5–14.5)
GLOBULIN SER CALC-MCNC: 3.8 G/DL (ref 2–4)
GLUCOSE SERPL-MCNC: 111 MG/DL (ref 65–100)
HCT VFR BLD AUTO: 35 % (ref 35–47)
HGB BLD-MCNC: 11.7 G/DL (ref 11.5–16)
LYMPHOCYTES # BLD AUTO: 33 % (ref 12–49)
LYMPHOCYTES # BLD: 1.4 K/UL (ref 0.8–3.5)
MCH RBC QN AUTO: 30.3 PG (ref 26–34)
MCHC RBC AUTO-ENTMCNC: 33.4 G/DL (ref 30–36.5)
MCV RBC AUTO: 90.7 FL (ref 80–99)
MONOCYTES # BLD: 0.4 K/UL (ref 0–1)
MONOCYTES NFR BLD AUTO: 10 % (ref 5–13)
NEUTS SEG # BLD: 2.3 K/UL (ref 1.8–8)
NEUTS SEG NFR BLD AUTO: 54 % (ref 32–75)
PLATELET # BLD AUTO: 312 K/UL (ref 150–400)
POTASSIUM SERPL-SCNC: 3.6 MMOL/L (ref 3.5–5.1)
PROT SERPL-MCNC: 7.3 G/DL (ref 6.4–8.2)
RBC # BLD AUTO: 3.86 M/UL (ref 3.8–5.2)
SODIUM SERPL-SCNC: 141 MMOL/L (ref 136–145)
WBC # BLD AUTO: 4.2 K/UL (ref 3.6–11)

## 2017-07-17 PROCEDURE — 74011250636 HC RX REV CODE- 250/636: Performed by: INTERNAL MEDICINE

## 2017-07-17 PROCEDURE — 74011250636 HC RX REV CODE- 250/636

## 2017-07-17 PROCEDURE — 74011000250 HC RX REV CODE- 250

## 2017-07-17 PROCEDURE — 77030012965 HC NDL HUBR BBMI -A

## 2017-07-17 PROCEDURE — 96413 CHEMO IV INFUSION 1 HR: CPT

## 2017-07-17 PROCEDURE — 85025 COMPLETE CBC W/AUTO DIFF WBC: CPT | Performed by: INTERNAL MEDICINE

## 2017-07-17 PROCEDURE — 36415 COLL VENOUS BLD VENIPUNCTURE: CPT | Performed by: INTERNAL MEDICINE

## 2017-07-17 PROCEDURE — 86300 IMMUNOASSAY TUMOR CA 15-3: CPT | Performed by: INTERNAL MEDICINE

## 2017-07-17 PROCEDURE — 96375 TX/PRO/DX INJ NEW DRUG ADDON: CPT

## 2017-07-17 PROCEDURE — 80053 COMPREHEN METABOLIC PANEL: CPT | Performed by: INTERNAL MEDICINE

## 2017-07-17 RX ORDER — SODIUM CHLORIDE 9 MG/ML
10 INJECTION INTRAMUSCULAR; INTRAVENOUS; SUBCUTANEOUS AS NEEDED
Status: DISPENSED | OUTPATIENT
Start: 2017-07-17 | End: 2017-07-18

## 2017-07-17 RX ORDER — SODIUM CHLORIDE 0.9 % (FLUSH) 0.9 %
10 SYRINGE (ML) INJECTION AS NEEDED
Status: ACTIVE | OUTPATIENT
Start: 2017-07-17 | End: 2017-07-18

## 2017-07-17 RX ORDER — HEPARIN 100 UNIT/ML
500 SYRINGE INTRAVENOUS AS NEEDED
Status: ACTIVE | OUTPATIENT
Start: 2017-07-17 | End: 2017-07-18

## 2017-07-17 RX ADMIN — DEXAMETHASONE SODIUM PHOSPHATE 4 MG: 4 INJECTION, SOLUTION INTRA-ARTICULAR; INTRALESIONAL; INTRAMUSCULAR; INTRAVENOUS; SOFT TISSUE at 08:54

## 2017-07-17 RX ADMIN — HEPARIN SODIUM (PORCINE) LOCK FLUSH IV SOLN 100 UNIT/ML 500 UNITS: 100 SOLUTION at 10:05

## 2017-07-17 RX ADMIN — SODIUM CHLORIDE 10 ML: 9 INJECTION INTRAMUSCULAR; INTRAVENOUS; SUBCUTANEOUS at 07:45

## 2017-07-17 RX ADMIN — SODIUM CHLORIDE 25 ML/HR: 900 INJECTION, SOLUTION INTRAVENOUS at 08:53

## 2017-07-17 RX ADMIN — Medication 10 ML: at 10:05

## 2017-07-17 RX ADMIN — ADO-TRASTUZUMAB EMTANSINE 200 MG: 20 INJECTION, POWDER, LYOPHILIZED, FOR SOLUTION INTRAVENOUS at 09:30

## 2017-07-17 RX ADMIN — Medication 10 ML: at 07:45

## 2017-07-17 RX ADMIN — Medication 10 ML: at 07:46

## 2017-07-17 NOTE — TELEPHONE ENCOUNTER
Called and spoke with Henry Baker to decrease dexamethasone to 4 mg IV pre-chemo today. Order faxed to infusion center.

## 2017-07-17 NOTE — PROGRESS NOTES
[facility] VISIT NOTE    0730  Pt arrived at [facility] ambulatory and in no distress for C3 Kadcyla. Assessment completed, pt reports no complaints today. Blood pressure 93/60, pulse 81, temperature 98.8 °F (37.1 °C), resp. rate 18, weight 66.8 kg (147 lb 4.8 oz), not currently breastfeeding. Right chest port accessed with 0.75 in low no difficulty. Positive blood return noted and labs drawn. Patient requests decrease in Dexamethasone from 8mg IV to 4mg IV. OK per Emeka Amato NP to change dose and she sent a new order for today. Recent Results (from the past 12 hour(s))   CBC WITH AUTOMATED DIFF    Collection Time: 07/17/17  7:45 AM   Result Value Ref Range    WBC 4.2 3.6 - 11.0 K/uL    RBC 3.86 3.80 - 5.20 M/uL    HGB 11.7 11.5 - 16.0 g/dL    HCT 35.0 35.0 - 47.0 %    MCV 90.7 80.0 - 99.0 FL    MCH 30.3 26.0 - 34.0 PG    MCHC 33.4 30.0 - 36.5 g/dL    RDW 13.3 11.5 - 14.5 %    PLATELET 502 532 - 196 K/uL    NEUTROPHILS 54 32 - 75 %    LYMPHOCYTES 33 12 - 49 %    MONOCYTES 10 5 - 13 %    EOSINOPHILS 2 0 - 7 %    BASOPHILS 1 0 - 1 %    ABS. NEUTROPHILS 2.3 1.8 - 8.0 K/UL    ABS. LYMPHOCYTES 1.4 0.8 - 3.5 K/UL    ABS. MONOCYTES 0.4 0.0 - 1.0 K/UL    ABS. EOSINOPHILS 0.1 0.0 - 0.4 K/UL    ABS. BASOPHILS 0.0 0.0 - 0.1 K/UL   METABOLIC PANEL, COMPREHENSIVE    Collection Time: 07/17/17  7:45 AM   Result Value Ref Range    Sodium 141 136 - 145 mmol/L    Potassium 3.6 3.5 - 5.1 mmol/L    Chloride 106 97 - 108 mmol/L    CO2 28 21 - 32 mmol/L    Anion gap 7 5 - 15 mmol/L    Glucose 111 (H) 65 - 100 mg/dL    BUN 15 6 - 20 MG/DL    Creatinine 0.77 0.55 - 1.02 MG/DL    BUN/Creatinine ratio 19 12 - 20      GFR est AA >60 >60 ml/min/1.73m2    GFR est non-AA >60 >60 ml/min/1.73m2    Calcium 9.2 8.5 - 10.1 MG/DL    Bilirubin, total 0.3 0.2 - 1.0 MG/DL    ALT (SGPT) 124 (H) 12 - 78 U/L    AST (SGOT) 78 (H) 15 - 37 U/L    Alk.  phosphatase 144 (H) 45 - 117 U/L    Protein, total 7.3 6.4 - 8.2 g/dL    Albumin 3.5 3.5 - 5.0 g/dL    Globulin 3.8 2.0 - 4.0 g/dL    A-G Ratio 0.9 (L) 1.1 - 2.2       Medications received:  NS IV  Dexamethasone IVP  Kadcyla IV    Blood pressure 104/60, pulse 80, temperature 98.8 °F (37.1 °C), resp. rate 18, weight 66.8 kg (147 lb 4.8 oz), not currently breastfeeding. Tolerated treatment well, no adverse reaction noted. Port de-accessed and flushed per protocol. Positive blood return noted. 1010  D/C'd from [facility] ambulatory and in no distress. Next appointment is 8/7/17 at 0730.

## 2017-07-17 NOTE — TELEPHONE ENCOUNTER
Received call from Onesimo Chamberlain nurse at [address] [place] [address]. States patient is asking if dexamethasone dose can be decreased from 8 mg to 4 mg. Patient states this was discussed with provider last week. Patient reports higher dose makes her feel \"jumpy\". Will forward to provider for recommendation.    Onesimo Chamberlain- [phone]

## 2017-07-18 ENCOUNTER — OFFICE VISIT (OUTPATIENT)
Dept: ONCOLOGY | Age: 57
End: 2017-07-18

## 2017-07-18 ENCOUNTER — APPOINTMENT (OUTPATIENT)
Dept: INFUSION THERAPY | Age: 57
End: 2017-07-18

## 2017-07-18 VITALS
BODY MASS INDEX: 23.7 KG/M2 | DIASTOLIC BLOOD PRESSURE: 80 MMHG | HEART RATE: 81 BPM | OXYGEN SATURATION: 99 % | WEIGHT: 151 LBS | TEMPERATURE: 98.3 F | SYSTOLIC BLOOD PRESSURE: 112 MMHG | HEIGHT: 67 IN | RESPIRATION RATE: 16 BRPM

## 2017-07-18 DIAGNOSIS — R53.83 FATIGUE, UNSPECIFIED TYPE: ICD-10-CM

## 2017-07-18 DIAGNOSIS — C78.7 LIVER METASTASES (HCC): ICD-10-CM

## 2017-07-18 DIAGNOSIS — Z09 CHEMOTHERAPY FOLLOW-UP EXAMINATION: ICD-10-CM

## 2017-07-18 DIAGNOSIS — C79.51 BONE METASTASES (HCC): ICD-10-CM

## 2017-07-18 DIAGNOSIS — C50.919 BREAST CANCER, STAGE 4, UNSPECIFIED LATERALITY (HCC): Primary | ICD-10-CM

## 2017-07-18 DIAGNOSIS — R51.9 NONINTRACTABLE HEADACHE, UNSPECIFIED CHRONICITY PATTERN, UNSPECIFIED HEADACHE TYPE: ICD-10-CM

## 2017-07-18 LAB
CANCER AG15-3 SERPL-ACNC: 318.1 U/ML (ref 0–25)
CANCER AG27-29 SERPL-ACNC: 414.5 U/ML (ref 0–38.6)

## 2017-07-18 NOTE — PROGRESS NOTES
HEME/ONC CONSULT       Jazmin Simon is a 64 y.o. 1960 female and presents with Follow-up and Breast Cancer    CC  Breast cancer 2004    HPI: Ms. Frandy Murguia is here today for follow-up for stage 4 breast cancer ER+ HER2 + on kadcyla. She received cycle 4 yesterday at [address] [place] [address]. She reports she is tolerating the lower dose of Kadcyla (200 mg) better. She received a lower dose of dexamethasone yesterday and feels better today as well. She had experienced difficulty sleeping and shakiness. She remains fatigued. She also reports that she has had sinus pressure around her teeth and eyes. Her nose drips frequently. She has had headaches since starting therapy. She denies vision changes. Headaches are worse in the mornings. She takes Advil as needed which helps. She denies weakness of her arms or legs. She occasionally feels lightheaded/dizzy which she attributes to low blood pressure at times. This is not new. She denies shortness or breath or cough. She denies fevers or chills. She has had no nausea, vomiting, diarrhea, or constipation. DX   Encounter Diagnoses   Name Primary?  Breast cancer, stage 4, unspecified laterality ([address] Yes    Liver metastases ([address]     Bone metastases ([address]     Fatigue, unspecified type     Nonintractable headache, unspecified chronicity pattern, unspecified headache type         STAGE: 4 liver and bones    TREATMENT COURSE: kadcyla started 6/17.   4/2004, s/p L mastectomy with Dr. Sabrina Caal at [facility]. Stage IIB, T2N1M0, 3/39 LN +, ER negative, HI +, HER 2 +, was enrolled on NSABP B-31 trial with adriamycin/cytoxan q 3 weeks x 4 followed by taxol weekly with herceptin x 12 then completion of a year of herceptin. States she did not receive XRT. Did receive 5 years of tamoxifen from 1534-4486, which she tolerated well. In 2009 she noticed a chest wall mass. 11/23/10 FNA of chest wall mass showed ER + > 90%, HI + > 90%, MIB 50%, HER 2 + by FISH ratio 6.29. S/p BSO 12/27/10. Received zometa from 2010 to 2011. In 2011, she was started on q 3 week trastuzumab and exemestane, but states she took the exemestane sparingly due to joint pains and body aches. Has not taken any AI since 2013. Briefly took anastrozole in 5/2014. Stopped anastrozole 1/1/15 due to continued body and joint aches. Started Herceptin 1/9/17, stopped 4/25/17 due to progressive disease noted on scans  Started Kadcyla 3.6 mg/kg every 21 days on 6/1/17, reduced to 3 mg/kg due to side effects on 6/26/17    No past medical history on file. Past Surgical History:   Procedure Laterality Date    BREAST SURGERY PROCEDURE UNLISTED  2004    mastectomy    HX GYN  2010    ovaries removed     Social History     Social History    Marital status:      Spouse name: N/A    Number of children: N/A    Years of education: N/A     Social History Main Topics    Smoking status: Never Smoker    Smokeless tobacco: Never Used    Alcohol use Yes      Comment: 3-4 drinks/month    Drug use: No    Sexual activity: No     Other Topics Concern    None     Social History Narrative     Family History   Problem Relation Age of Onset    Hypertension Mother     Cancer Father      mesothelioma    Cancer Maternal Grandfather 76     Bladder       Current Outpatient Prescriptions   Medication Sig Dispense Refill    OTHER Indications: Organic Sulfa- Powder mix with liquids  , 4x per day      ascorbic acid, vitamin C, (VITAMIN C) 250 mg tablet Take 250 mg by mouth daily.  MILK THISTLE, BULK, by Does Not Apply route.  IODINE PO Take  by mouth.  BETA 1,3 GLUCAN, BULK, 3 Tabs by Does Not Apply route daily.  OTHER,NON-FORMULARY, Mushroom extract twice daily; Crocifurous extract      TURMERIC, BULK, by Does Not Apply route daily.  cholecalciferol (VITAMIN D3) 1,000 unit tablet Take 4,000 Units by mouth daily.  multivitamin (ONE A DAY) tablet Take 1 tablet by mouth daily.       coenzyme q10 (CO Q-10) 10 mg cap Take  by mouth daily.  LACTOBAC CMB #3/FOS/PANTETHINE (PROBIOTIC & ACIDOPHILUS PO) Take  by mouth daily.  magnesium 250 mg tab Take 500 mg by mouth daily. Allergies   Allergen Reactions    Pcn [Penicillins] Anaphylaxis    Contrast Agent [Iodine] Shortness of Breath and Itching     Patient reported during phone conversation on 4/18/17. Review of Systems    A comprehensive review of systems was negative except for: per HPI     Objective:  Visit Vitals    /80 (BP 1 Location: Right arm, BP Patient Position: Sitting)    Pulse 81    Temp 98.3 °F (36.8 °C) (Oral)    Resp 16    Ht 5' 6.93\" (1.7 m)    Wt 151 lb (68.5 kg)    SpO2 99%    BMI 23.7 kg/m2     Physical Exam:   General appearance - alert, well appearing, and in no distress, oriented to person, place, and time and normal appearing weight  Mental status - alert, oriented to person, place, and time, normal mood, behavior, speech, dress, motor activity, and thought processes  EYE-conj clear  Mouth - mucous membranes pink, moist, intact  Neck - supple, no adenopathy appreciated  Chest - clear to auscultation bilaterally  Heart - normal rate and regular rhythm   Abdomen - round, soft, non-tender  Ext-no pedal edema noted  Skin-Warm and dry. Intact without rash.   Neuro -alert, oriented, normal speech, no focal findings or movement disorder noted    Diagnostic Imaging   reviewed    Results for orders placed during the hospital encounter of 12/07/16   CT CHEST ABD PELV W CONT    Narrative STUDY: CT CHEST ABD PELV W CONT, ABDOMEN, AND PELVIS WITH CONTRAST  Clinical history: Metastatic breast cancer, evaluate for progression      INDICATION: metastatic breast cancer, evaluate disease status    CONTRAST: 95 mL [id number]        COMPARISON: 8/10/2015, 11/10/14    TECHNIQUE:  Computed tomography images of the chest, abdomen, and pelvis were  obtained from the thoracic inlet through the pubic symphysis via spiral  acquisition after the administration of intravenous contrast. Multiplanar  reconstructions were performed in soft tissue and lung windows. FINDINGS:   CHEST:  Thyroid:  Normal in appearance. Mediastinum: There has been revision of a right IJ Port-A-Cath. A Port-A-Cath  tip projects in the right atrium. Normal size heart. No significant pericardial  fluid. Normal-sized main pulmonary artery. No lymphadenopathy. Lungs/pleura: Patent central airways. No airspace disease or pleural effusion. Unchanged nonspecific subcentimeter groundglass opacity left lower lobe . ABDOMEN AND PELVIS:  Liver and gallbladder:           Hepatic hypodensities    Segment 2 2-54 left hepatic lobe 12.8 x 12.8 mm. Right hepatic lobe 2-53 9 x 9 mm. Right hepatic lobe 2-43 8 x 8 mm. There are additionally 2 tiny hypodensities adjacent to the posterior right  hepatic vein also seen on image 2-53 approximately 4 to 5 mm in size. .       Normal-appearing gallbladder. Spleen, adrenal glands, and pancreas:  Normal in appearance. Kidneys:  Symmetric enhancement. No hydronephrosis. Bowel and peritoneum:  No bowel obstruction. No ascites. Genitourinary:  Normal appearing uterus, adnexa, and bladder. Vasculature and lymph nodes:  Patent vasculature. No lymphadenopathy. Osseous structures the chest, abdomen, and pelvis: Unchanged sclerotic  metastasis with severe pathologic compression fracture at T9. Unchanged  sclerotic metastases seen in the spine. Body wall:  Status post left mastectomy and breast implant placement. Postsurgical changes seen in the left axilla. Impression IMPRESSION:  1. Findings are consistent with interval progression of disease. There are new  hepatic lesions present up to 13 x 13 mm in size. .    2. Unchanged osseous metastases.        Lab Results  reviewed  Lab Results   Component Value Date/Time    WBC 4.2 07/17/2017 07:45 AM    HGB 11.7 07/17/2017 07:45 AM    HCT 35.0 07/17/2017 07:45 AM    PLATELET 037 07/17/2017 07:45 AM    MCV 90.7 07/17/2017 07:45 AM     Lab Results   Component Value Date/Time    Sodium 141 07/17/2017 07:45 AM    Potassium 3.6 07/17/2017 07:45 AM    Chloride 106 07/17/2017 07:45 AM    CO2 28 07/17/2017 07:45 AM    Anion gap 7 07/17/2017 07:45 AM    Glucose 111 07/17/2017 07:45 AM    BUN 15 07/17/2017 07:45 AM    Creatinine 0.77 07/17/2017 07:45 AM    BUN/Creatinine ratio 19 07/17/2017 07:45 AM    GFR est AA >60 07/17/2017 07:45 AM    GFR est non-AA >60 07/17/2017 07:45 AM    Calcium 9.2 07/17/2017 07:45 AM    AST (SGOT) 78 07/17/2017 07:45 AM    Alk. phosphatase 144 07/17/2017 07:45 AM    Protein, total 7.3 07/17/2017 07:45 AM    Albumin 3.5 07/17/2017 07:45 AM    Globulin 3.8 07/17/2017 07:45 AM    A-G Ratio 0.9 07/17/2017 07:45 AM    ALT (SGPT) 124 07/17/2017 07:45 AM       Assessment/Plan:    1. Stage 4 breast cancer ER+ HER2 amplified at recurrence with mets to liver and bones in past.   Patient presented in 12/2016 with worsening back pain. MRI spine showed cord compression. CT's showed worsened bone and liver disease. She completed radiation to the spine. Pt preferred to do herceptin only and f/u PET on herceptin (in April 2017) looked worse with progressive disease in lungs/ liver/ bones. She is now on Kadcyla and is tolerating it well. She received cycle 4 yesterday. Dose has been reduced to 3 mg/kg due to side effects. Dexamethasone has been reduced to 4 mg prior to therapy and she tolerated her infusion well yesterday. Will continue therapy and order CT's to evaluate disease status at this time. Patient has allergy to contrast and per preference wishes to proceed with CT without contrast for now. CT's ordered today. Patient states she will schedule for August.    2. Back pain. She has no pain at this time. She is s/p radiation to the spine for cord compression. 3. Headaches. Recommended having brain MRI to rule-out metastatic disease.  Patient agrees but wants to wait until August for all scans. Brain MRI ordered. Follow-up in 4 weeks.     Seen in conjunction with Shanda Chavez NP.

## 2017-07-23 PROBLEM — Z09 CHEMOTHERAPY FOLLOW-UP EXAMINATION: Status: ACTIVE | Noted: 2017-07-23

## 2017-07-24 ENCOUNTER — TELEPHONE (OUTPATIENT)
Dept: ONCOLOGY | Age: 57
End: 2017-07-24

## 2017-07-24 NOTE — TELEPHONE ENCOUNTER
HIPAA verified. Patient stated has $1000 out of pocket cost for MRI and stated is not able to pay at present. Stated would contact insurance company to verify. Stated runny nose and sinus pressure have increased since starting kadcyla. Also stated would like to see Dr. Alessandra Toth at all subsequent visits. Advised would forward to PSR to change appts. Patient will contact us with update if able to afford MRI. To provider for review.

## 2017-07-25 ENCOUNTER — NURSE NAVIGATOR (OUTPATIENT)
Dept: ONCOLOGY | Age: 57
End: 2017-07-25

## 2017-07-25 NOTE — PROGRESS NOTES
Kusum Alvarez, : 1960      I was referred to Yasemin Ross today because she reported that she could not afford $800-$1000 for her co-insurance for an MRI that has been ordered by Dr. Niktia Almonte for this week. I provided Yasemin Ross with the telephone numbers for [employer] and [employer]. Yasemin Ross was to f/u with me today regarding the status of obtaining assistance. I have attempted to contact patient today to obtain information regarding additional support that she might need. Patient has not returned my telephone call at this time. I will f/u on Wednesday, 17.     Debra Tellez, RN, BSN, [facility], [facility]    Oncology Nurse Navigator

## 2017-07-25 NOTE — TELEPHONE ENCOUNTER
Appointments with Dr. Radha Davalos as requested. RN Navigator, Alaina Pa, working with patient for [facility] [place] cost for MRI.

## 2017-07-26 ENCOUNTER — NURSE NAVIGATOR (OUTPATIENT)
Dept: ONCOLOGY | Age: 57
End: 2017-07-26

## 2017-07-26 NOTE — PROGRESS NOTES
Karine Levy, : 1960    I contact patient by telephone this morning and left a VM regarding her MRI that needs to be scheduled and her OOP co-payments. Patient has not returned my call on 17 regarding this matter. I contacted [facility] Financial Counselor and have left a VM regarding options for financial assistance regarding the copays for MRI that needs to be scheduled due to medical necessity, possible brain metastases.       Oscar Lewis RN, BSN, [facility], [facility]    Oncology Nurse Navigator

## 2017-08-02 RX ORDER — DEXAMETHASONE SODIUM PHOSPHATE 4 MG/ML
8 INJECTION, SOLUTION INTRA-ARTICULAR; INTRALESIONAL; INTRAMUSCULAR; INTRAVENOUS; SOFT TISSUE ONCE
Status: ACTIVE | OUTPATIENT
Start: 2017-08-07 | End: 2017-08-07

## 2017-08-02 RX ORDER — PROCHLORPERAZINE EDISYLATE 5 MG/ML
10 INJECTION INTRAMUSCULAR; INTRAVENOUS
Status: ACTIVE | OUTPATIENT
Start: 2017-08-07 | End: 2017-08-07

## 2017-08-02 RX ORDER — SODIUM CHLORIDE 9 MG/ML
25 INJECTION, SOLUTION INTRAVENOUS CONTINUOUS
Status: DISPENSED | OUTPATIENT
Start: 2017-08-07 | End: 2017-08-07

## 2017-08-04 ENCOUNTER — HOSPITAL ENCOUNTER (OUTPATIENT)
Dept: INFUSION THERAPY | Age: 57
Discharge: HOME | End: 2017-08-04
Payer: COMMERCIAL

## 2017-08-04 VITALS
TEMPERATURE: 97 F | DIASTOLIC BLOOD PRESSURE: 59 MMHG | SYSTOLIC BLOOD PRESSURE: 93 MMHG | HEART RATE: 78 BPM | RESPIRATION RATE: 16 BRPM

## 2017-08-04 LAB
ALBUMIN SERPL BCP-MCNC: 3.8 G/DL (ref 3.5–5)
ALBUMIN/GLOB SERPL: 0.9 {RATIO} (ref 1.1–2.2)
ALP SERPL-CCNC: 139 U/L (ref 45–117)
ALT SERPL-CCNC: 101 U/L (ref 12–78)
ANION GAP BLD CALC-SCNC: 3 MMOL/L (ref 5–15)
AST SERPL W P-5'-P-CCNC: 81 U/L (ref 15–37)
BASOPHILS # BLD AUTO: 0 K/UL (ref 0–0.1)
BASOPHILS # BLD: 1 % (ref 0–1)
BILIRUB SERPL-MCNC: 0.2 MG/DL (ref 0.2–1)
BUN SERPL-MCNC: 14 MG/DL (ref 6–20)
BUN/CREAT SERPL: 19 (ref 12–20)
CALCIUM SERPL-MCNC: 9.3 MG/DL (ref 8.5–10.1)
CHLORIDE SERPL-SCNC: 102 MMOL/L (ref 97–108)
CO2 SERPL-SCNC: 33 MMOL/L (ref 21–32)
CREAT SERPL-MCNC: 0.74 MG/DL (ref 0.55–1.02)
EOSINOPHIL # BLD: 0.1 K/UL (ref 0–0.4)
EOSINOPHIL NFR BLD: 1 % (ref 0–7)
ERYTHROCYTE [DISTWIDTH] IN BLOOD BY AUTOMATED COUNT: 13.1 % (ref 11.5–14.5)
GLOBULIN SER CALC-MCNC: 4.1 G/DL (ref 2–4)
GLUCOSE SERPL-MCNC: 90 MG/DL (ref 65–100)
HCT VFR BLD AUTO: 35.4 % (ref 35–47)
HGB BLD-MCNC: 12 G/DL (ref 11.5–16)
LYMPHOCYTES # BLD AUTO: 29 % (ref 12–49)
LYMPHOCYTES # BLD: 1.8 K/UL (ref 0.8–3.5)
MCH RBC QN AUTO: 30.1 PG (ref 26–34)
MCHC RBC AUTO-ENTMCNC: 33.9 G/DL (ref 30–36.5)
MCV RBC AUTO: 88.7 FL (ref 80–99)
MONOCYTES # BLD: 0.6 K/UL (ref 0–1)
MONOCYTES NFR BLD AUTO: 10 % (ref 5–13)
NEUTS SEG # BLD: 3.6 K/UL (ref 1.8–8)
NEUTS SEG NFR BLD AUTO: 59 % (ref 32–75)
PLATELET # BLD AUTO: 259 K/UL (ref 150–400)
POTASSIUM SERPL-SCNC: 3.7 MMOL/L (ref 3.5–5.1)
PROT SERPL-MCNC: 7.9 G/DL (ref 6.4–8.2)
RBC # BLD AUTO: 3.99 M/UL (ref 3.8–5.2)
SODIUM SERPL-SCNC: 138 MMOL/L (ref 136–145)
WBC # BLD AUTO: 6 K/UL (ref 3.6–11)

## 2017-08-04 PROCEDURE — 36415 COLL VENOUS BLD VENIPUNCTURE: CPT

## 2017-08-04 PROCEDURE — 80053 COMPREHEN METABOLIC PANEL: CPT | Performed by: INTERNAL MEDICINE

## 2017-08-04 PROCEDURE — 36415 COLL VENOUS BLD VENIPUNCTURE: CPT | Performed by: INTERNAL MEDICINE

## 2017-08-04 PROCEDURE — 85025 COMPLETE CBC W/AUTO DIFF WBC: CPT | Performed by: INTERNAL MEDICINE

## 2017-08-04 NOTE — PROGRESS NOTES
[facility] LAB NOTE    Pt arrived at [phone] left at 1458    Blood pressure 93/59, pulse 78, temperature 97 °F (36.1 °C), resp. rate 16.       Labs drawn peripherally as ordered    Lab will be in connect care

## 2017-08-07 ENCOUNTER — HOSPITAL ENCOUNTER (OUTPATIENT)
Dept: INFUSION THERAPY | Age: 57
Discharge: HOME | End: 2017-08-07
Payer: COMMERCIAL

## 2017-08-07 ENCOUNTER — APPOINTMENT (OUTPATIENT)
Dept: INFUSION THERAPY | Age: 57
End: 2017-08-07

## 2017-08-07 VITALS
TEMPERATURE: 97.8 F | SYSTOLIC BLOOD PRESSURE: 106 MMHG | BODY MASS INDEX: 22.44 KG/M2 | WEIGHT: 143 LBS | HEIGHT: 67 IN | HEART RATE: 73 BPM | RESPIRATION RATE: 18 BRPM | DIASTOLIC BLOOD PRESSURE: 70 MMHG

## 2017-08-07 RX ORDER — SODIUM CHLORIDE 9 MG/ML
10 INJECTION INTRAMUSCULAR; INTRAVENOUS; SUBCUTANEOUS AS NEEDED
Status: DISPENSED | OUTPATIENT
Start: 2017-08-07 | End: 2017-08-08

## 2017-08-07 RX ORDER — SODIUM CHLORIDE 0.9 % (FLUSH) 0.9 %
10 SYRINGE (ML) INJECTION AS NEEDED
Status: ACTIVE | OUTPATIENT
Start: 2017-08-07 | End: 2017-08-08

## 2017-08-07 RX ORDER — HEPARIN 100 UNIT/ML
500 SYRINGE INTRAVENOUS AS NEEDED
Status: ACTIVE | OUTPATIENT
Start: 2017-08-07 | End: 2017-08-08

## 2017-08-07 RX ADMIN — SODIUM CHLORIDE 25 ML/HR: 900 INJECTION, SOLUTION INTRAVENOUS at 08:29

## 2017-08-07 RX ADMIN — Medication 10 ML: at 07:52

## 2017-08-07 RX ADMIN — Medication 500 UNITS: at 09:06

## 2017-08-07 RX ADMIN — Medication 10 ML: at 09:06

## 2017-08-07 RX ADMIN — ADO-TRASTUZUMAB EMTANSINE 200 MG: 20 INJECTION, POWDER, LYOPHILIZED, FOR SOLUTION INTRAVENOUS at 08:30

## 2017-08-07 RX ADMIN — SODIUM CHLORIDE 10 ML: 9 INJECTION INTRAMUSCULAR; INTRAVENOUS; SUBCUTANEOUS at 07:52

## 2017-08-07 NOTE — PROGRESS NOTES
[facility] VISIT NOTE    0730  Pt arrived at [facility] ambulatory and in no distress for C4 of Kadcyla. Assessment completed, pt c/o some fatigue. No other complaints at this time. Right chest port accessed with 0.75 in low with no difficulty. Positive blood return noted. Labs reviewed from 8/4/17 and are within treatment parameters. Medications received:  NS at [address] declined Decadron   Kadcyla IV    Patient Vitals for the past 12 hrs:   Temp Pulse Resp BP   08/07/17 0906 97.8 °F (36.6 °C) 73 18 106/70   08/07/17 0736 - 74 18 97/61     Tolerated treatment well, no adverse reaction noted. Port de-accessed and flushed per protocol. Positive blood return noted. 9893  D/C'd from [facility] ambulatory and in no distress accompanied by her . Gris Olivarez is aware that pt needs more appointments scheduled.

## 2017-08-08 ENCOUNTER — APPOINTMENT (OUTPATIENT)
Dept: INFUSION THERAPY | Age: 57
End: 2017-08-08

## 2017-08-11 ENCOUNTER — TELEPHONE (OUTPATIENT)
Dept: ONCOLOGY | Age: 57
End: 2017-08-11

## 2017-08-16 ENCOUNTER — DOCUMENTATION ONLY (OUTPATIENT)
Dept: ONCOLOGY | Age: 57
End: 2017-08-16

## 2017-08-20 ENCOUNTER — HOSPITAL ENCOUNTER (OUTPATIENT)
Dept: CT IMAGING | Age: 57
Discharge: HOME | End: 2017-08-20
Attending: NURSE PRACTITIONER
Payer: COMMERCIAL

## 2017-08-20 DIAGNOSIS — C79.51 BONE METASTASES (HCC): ICD-10-CM

## 2017-08-20 DIAGNOSIS — C78.7 LIVER METASTASES (HCC): ICD-10-CM

## 2017-08-20 DIAGNOSIS — C50.919 BREAST CANCER, STAGE 4, UNSPECIFIED LATERALITY (HCC): ICD-10-CM

## 2017-08-20 PROCEDURE — 74176 CT ABD & PELVIS W/O CONTRAST: CPT

## 2017-08-20 PROCEDURE — 71250 CT THORAX DX C-: CPT

## 2017-08-21 ENCOUNTER — CLINICAL SUPPORT (OUTPATIENT)
Dept: CARDIOLOGY CLINIC | Age: 57
End: 2017-08-21

## 2017-08-21 DIAGNOSIS — Z79.899 ENCOUNTER FOR MONITORING CARDIOTOXIC DRUG THERAPY: ICD-10-CM

## 2017-08-21 DIAGNOSIS — Z51.81 ENCOUNTER FOR MONITORING CARDIOTOXIC DRUG THERAPY: ICD-10-CM

## 2017-08-21 DIAGNOSIS — C50.919 BREAST CANCER, STAGE 4, UNSPECIFIED LATERALITY (HCC): ICD-10-CM

## 2017-08-23 RX ORDER — SODIUM CHLORIDE 9 MG/ML
25 INJECTION, SOLUTION INTRAVENOUS CONTINUOUS
Status: DISPENSED | OUTPATIENT
Start: 2017-08-28 | End: 2017-08-28

## 2017-08-23 RX ORDER — DEXAMETHASONE SODIUM PHOSPHATE 4 MG/ML
8 INJECTION, SOLUTION INTRA-ARTICULAR; INTRALESIONAL; INTRAMUSCULAR; INTRAVENOUS; SOFT TISSUE ONCE
Status: ACTIVE | OUTPATIENT
Start: 2017-08-28 | End: 2017-08-28

## 2017-08-23 RX ORDER — PROCHLORPERAZINE EDISYLATE 5 MG/ML
10 INJECTION INTRAMUSCULAR; INTRAVENOUS
Status: ACTIVE | OUTPATIENT
Start: 2017-08-28 | End: 2017-08-28

## 2017-08-28 ENCOUNTER — HOSPITAL ENCOUNTER (OUTPATIENT)
Dept: INFUSION THERAPY | Age: 57
Discharge: HOME | End: 2017-08-28
Payer: COMMERCIAL

## 2017-08-28 ENCOUNTER — APPOINTMENT (OUTPATIENT)
Dept: INFUSION THERAPY | Age: 57
End: 2017-08-28

## 2017-08-29 ENCOUNTER — APPOINTMENT (OUTPATIENT)
Dept: INFUSION THERAPY | Age: 57
End: 2017-08-29

## 2017-08-30 ENCOUNTER — OFFICE VISIT (OUTPATIENT)
Dept: ONCOLOGY | Age: 57
End: 2017-08-30

## 2017-08-30 VITALS
BODY MASS INDEX: 23.95 KG/M2 | DIASTOLIC BLOOD PRESSURE: 73 MMHG | HEIGHT: 66 IN | WEIGHT: 149 LBS | HEART RATE: 71 BPM | OXYGEN SATURATION: 99 % | TEMPERATURE: 71 F | SYSTOLIC BLOOD PRESSURE: 109 MMHG | RESPIRATION RATE: 16 BRPM

## 2017-08-30 DIAGNOSIS — Z09 CHEMOTHERAPY FOLLOW-UP EXAMINATION: ICD-10-CM

## 2017-08-30 DIAGNOSIS — C50.919 BREAST CANCER, STAGE 4, UNSPECIFIED LATERALITY (HCC): Primary | ICD-10-CM

## 2017-08-30 DIAGNOSIS — C78.7 LIVER METASTASES (HCC): ICD-10-CM

## 2017-08-30 DIAGNOSIS — C79.51 BONE METASTASES (HCC): ICD-10-CM

## 2017-08-30 RX ORDER — DEXAMETHASONE SODIUM PHOSPHATE 4 MG/ML
8 INJECTION, SOLUTION INTRA-ARTICULAR; INTRALESIONAL; INTRAMUSCULAR; INTRAVENOUS; SOFT TISSUE ONCE
Status: DISPENSED | OUTPATIENT
Start: 2017-08-31 | End: 2017-08-31

## 2017-08-30 RX ORDER — SODIUM CHLORIDE 9 MG/ML
25 INJECTION, SOLUTION INTRAVENOUS CONTINUOUS
Status: DISPENSED | OUTPATIENT
Start: 2017-08-31 | End: 2017-08-31

## 2017-08-30 RX ORDER — SODIUM CHLORIDE 9 MG/ML
25 INJECTION, SOLUTION INTRAVENOUS CONTINUOUS
Status: DISCONTINUED | OUTPATIENT
Start: 2017-09-06 | End: 2017-08-30

## 2017-08-30 RX ORDER — PROCHLORPERAZINE EDISYLATE 5 MG/ML
10 INJECTION INTRAMUSCULAR; INTRAVENOUS
Status: ACTIVE | OUTPATIENT
Start: 2017-08-31 | End: 2017-08-31

## 2017-08-30 RX ORDER — DEXAMETHASONE SODIUM PHOSPHATE 4 MG/ML
8 INJECTION, SOLUTION INTRA-ARTICULAR; INTRALESIONAL; INTRAMUSCULAR; INTRAVENOUS; SOFT TISSUE ONCE
Status: DISCONTINUED | OUTPATIENT
Start: 2017-09-06 | End: 2017-08-30

## 2017-08-30 RX ORDER — PROCHLORPERAZINE EDISYLATE 5 MG/ML
10 INJECTION INTRAMUSCULAR; INTRAVENOUS
Status: DISCONTINUED | OUTPATIENT
Start: 2017-09-06 | End: 2017-08-30

## 2017-08-30 NOTE — PROGRESS NOTES
HEME/ONC CONSULT       Andrea Osei is a 64 y.o. 1960 female and presents with Breast Cancer    CC  Breast cancer 2004    HPI: Ms. Dewey Carrillo is here today for follow-up for stage 4 breast cancer ER+ HER2 + on kadcyla. Pt is feeling really well overall. Tolerating chemo. CTs done and showed good response to chemo. Pt and  here to discuss. DX   Encounter Diagnoses   Name Primary?  Breast cancer, stage 4, unspecified laterality ([address] Yes    Liver metastases ([address]     Bone metastases ([address]     Chemotherapy follow-up examination         STAGE: 4 liver and bones    TREATMENT COURSE: kadcyla started 6/17.   4/2004, s/p L mastectomy with Dr. Aixa Luque at [facility]. Stage IIB, T2N1M0, 3/39 LN +, ER negative, AZ +, HER 2 +, was enrolled on NSABP B-31 trial with adriamycin/cytoxan q 3 weeks x 4 followed by taxol weekly with herceptin x 12 then completion of a year of herceptin. States she did not receive XRT. Did receive 5 years of tamoxifen from 7210-9526, which she tolerated well. In 2009 she noticed a chest wall mass. 11/23/10 FNA of chest wall mass showed ER + > 90%, AZ + > 90%, MIB 50%, HER 2 + by FISH ratio 6.29. S/p BSO 12/27/10. Received zometa from 2010 to 2011. In 2011, she was started on q 3 week trastuzumab and exemestane, but states she took the exemestane sparingly due to joint pains and body aches. Has not taken any AI since 2013. Briefly took anastrozole in 5/2014. Stopped anastrozole 1/1/15 due to continued body and joint aches. Started Herceptin 1/9/17, stopped 4/25/17 due to progressive disease noted on scans  Started Kadcyla 3.6 mg/kg every 21 days on 6/1/17, reduced to 3 mg/kg due to side effects on 6/26/17    No past medical history on file.   Past Surgical History:   Procedure Laterality Date    BREAST SURGERY PROCEDURE UNLISTED  2004    mastectomy    HX GYN  2010    ovaries removed     Social History     Social History    Marital status:      Spouse name: N/A   [address] [place] [address] Number of children: N/A    Years of education: N/A     Social History Main Topics    Smoking status: Never Smoker    Smokeless tobacco: Never Used    Alcohol use Yes      Comment: 3-4 drinks/month    Drug use: No    Sexual activity: No     Other Topics Concern    None     Social History Narrative     Family History   Problem Relation Age of Onset    Hypertension Mother     Cancer Father      mesothelioma    Cancer Maternal Grandfather 76     Bladder       Current Outpatient Prescriptions   Medication Sig Dispense Refill    OTHER CBD oil, take 40 mg at bedtime      OTHER Indications: Organic Sulfa- Powder mix with liquids  , 4x per day      ascorbic acid, vitamin C, (VITAMIN C) 250 mg tablet Take 250 mg by mouth daily.  MILK THISTLE, BULK, by Does Not Apply route.  IODINE PO Take  by mouth.  BETA 1,3 GLUCAN, BULK, 3 Tabs by Does Not Apply route daily.  OTHER,NON-FORMULARY, Mushroom extract twice daily; Crocifurous extract      TURMERIC, BULK, by Does Not Apply route daily.  cholecalciferol (VITAMIN D3) 1,000 unit tablet Take 4,000 Units by mouth daily.  multivitamin (ONE A DAY) tablet Take 1 tablet by mouth daily.  coenzyme q10 (CO Q-10) 10 mg cap Take  by mouth daily.  LACTOBAC CMB #3/FOS/PANTETHINE (PROBIOTIC & ACIDOPHILUS PO) Take  by mouth daily.  magnesium 250 mg tab Take 500 mg by mouth daily.        Facility-Administered Medications Ordered in Other Visits   Medication Dose Route Frequency Provider Last Rate Last Dose    sodium chloride 0.9 % injection 10 mL  10 mL IntraVENous PRN Maldivian Moan Irineo, DO   10 mL at 08/31/17 0750    heparin (porcine) pf 500 Units  500 Units IntraVENous PRN Scott Quan Irineo, DO   500 Units at 08/31/17 1050    sodium chloride (NS) flush 5-10 mL  5-10 mL IntraVENous PRN Scott Romeroan Irineo, DO   10 mL at 08/31/17 1050    dexamethasone (DECADRON) 4 mg/mL injection 8 mg  8 mg IntraVENous ONCE [facility] Edgar Coles, DO        0.9% sodium chloride infusion  25 mL/hr IntraVENous CONTINUOUS Jose Jose, DO   Stopped at 08/31/17 1050    prochlorperazine (COMPAZINE) injection 10 mg  10 mg IntraVENous Q6H PRN Jose Jose, DO           Allergies   Allergen Reactions    Pcn [Penicillins] Anaphylaxis    Contrast Agent [Iodine] Shortness of Breath and Itching     Patient reported during phone conversation on 4/18/17. Review of Systems    A comprehensive review of systems was negative except for: per HPI     Objective:  Visit Vitals    /73    Pulse 71    Temp (!) 71 °F (21.7 °C) (Oral)    Resp 16    Ht 5' 6\" (1.676 m)    Wt 149 lb (67.6 kg)    SpO2 99%    BMI 24.05 kg/m2     Physical Exam:   General appearance - alert, well appearing, and in no distress, oriented to person, place, and time and normal appearing weight  Mental status - alert, oriented to person, place, and time, normal mood, behavior, speech, dress, motor activity, and thought processes  EYE-conj clear  Mouth - mucous membranes pink, moist, intact  Neck - supple, no adenopathy appreciated  Chest - clear to auscultation bilaterally  Heart - normal rate and regular rhythm   Abdomen - round, soft, non-tender  Ext-no pedal edema noted  Skin-Warm and dry. Intact without rash. Neuro -alert, oriented, normal speech, no focal findings or movement disorder noted    Diagnostic Imaging   reviewed    Results for orders placed during the hospital encounter of 08/20/17   CT ABD PELV WO CONT    Narrative INDICATION:   Metastatic breast cancer follow-up     EXAM:  CT chest, abdomen, and pelvis without CONTRAST    COMPARISON:  8/10/2015, PET/CT 4/26/2017    TECHNIQUE:  Thin collimation axial images were obtained through the chest,  abdomen, and pelvis without IV contrast administration. Coronal and sagittal  reconstructions were obtained. Oral contrast was administered.   CT dose  reduction was achieved through use of a standardized protocol tailored for this  examination and automatic exposure control for dose modulation. FINDINGS:    Chest:    LUNGS: Overall, significant improvement in small hypermetabolic bilateral lung  nodules. Stable small nodular/groundglass opacity in the left lower lobe  measuring 5 mm (series 3, image 52). Decrease in size of ill-defined small  nodular/groundglass opacity in the anterior right middle lobe (series 3, image  34). Medial right lower lobe atelectasis and/or scarring. No other significant  lung abnormality. No evidence for new focal lung lesion. The central airways are  patent  LYMPH NODES: No significant change in size of prominent to borderline enlarged  numerous mediastinal and bilateral hilar lymph nodes which were hypermetabolic  on the prior PET/CT. Slightly limited evaluation of the hilar lymph nodes  without intravenous contrast administration. No evidence for any new thoracic  lymphadenopathy. Sequelae of left axillary lymph node dissection with prominent  lymph nodes is unchanged. PLEURAL FLUID: No pleural effusion. PERICARDIAL FLUID: No pericardial effusion. THYROID: No thyroid nodule. OTHER: Right chest port extends to the atriocaval junction. Left breast  prosthesis is seen. Abdomen: Lack of IV contrast limits evaluation of the solid abdominal organs. LIVER: Numerous ill-defined low density liver metastases are again seen and  appear smaller in size compared to the prior PET/CT on 4/26/2017. A right  hepatic lobe lesion measures 7 mm (series 2, image 68), compared to 15 mm  previously. A left hepatic lobe lesion measures 7 mm (image 58), compared to 18  mm previously. No biliary ductal dilatation. GALLBLADDER: No calcified gallstone  SPLEEN: Unremarkable  PANCREAS: No mass or ductal dilatation. ADRENALS: Unremarkable. KIDNEYS/URETERS: Punctate nonobstructing left renal calculus. No focal renal  abnormality or hydronephrosis. PERITONEUM: No ascites.  No abdominal lymphadenopathy by CT criteria. Prominent  and borderline enlarged periaortic hypermetabolic retroperitoneal lymph nodes  are not significantly changed. COLON: No dilatation or wall thickening. APPENDIX: Not clearly seen  SMALL BOWEL: No dilatation or wall thickening. STOMACH: Unremarkable. Pelvis:      PELVIS: No pelvic lymphadenopathy or free fluid. BONES: Diffuse sclerotic osseous metastases are seen, progressed since 8/10/2015  with probably no significant change compared to 4/26/2017. Unchanged pathologic  vertebral body compression deformity involving T9.  ADDITIONAL COMMENTS: N/A      Impression IMPRESSION:    Partial response to therapy with decrease in size of pulmonary and hepatic  metastases        Lab Results  reviewed  Lab Results   Component Value Date/Time    WBC 4.3 08/31/2017 08:06 AM    HGB 11.9 08/31/2017 08:06 AM    HCT 35.6 08/31/2017 08:06 AM    PLATELET 031 62/31/3142 08:06 AM    MCV 91.5 08/31/2017 08:06 AM     Lab Results   Component Value Date/Time    Sodium 141 08/31/2017 08:06 AM    Potassium 4.2 08/31/2017 08:06 AM    Chloride 104 08/31/2017 08:06 AM    CO2 30 08/31/2017 08:06 AM    Anion gap 7 08/31/2017 08:06 AM    Glucose 77 08/31/2017 08:06 AM    BUN 17 08/31/2017 08:06 AM    Creatinine 0.74 08/31/2017 08:06 AM    BUN/Creatinine ratio 23 08/31/2017 08:06 AM    GFR est AA >60 08/31/2017 08:06 AM    GFR est non-AA >60 08/31/2017 08:06 AM    Calcium 9.2 08/31/2017 08:06 AM    AST (SGOT) 74 08/31/2017 08:06 AM    Alk. phosphatase 116 08/31/2017 08:06 AM    Protein, total 7.6 08/31/2017 08:06 AM    Albumin 3.6 08/31/2017 08:06 AM    Globulin 4.0 08/31/2017 08:06 AM    A-G Ratio 0.9 08/31/2017 08:06 AM    ALT (SGPT) 93 08/31/2017 08:06 AM       Assessment/Plan:    1. Stage 4 breast cancer ER+ HER2 amplified at recurrence with mets to liver and bones in past.   Patient presented in 12/2016 with worsening back pain. MRI spine showed cord compression.   CT's showed worsened bone and liver disease. She completed radiation to the spine. Pt preferred to do herceptin only and f/u PET on herceptin (in April 2017) looked worse with progressive disease in lungs/ liver/ bones. She is now on Kadcyla and is tolerating it well. CT's now show good response to treatment with decreased liver leisons and stable bones. Reviewed CTs with pt and  today. Pt is feeling well overall. Reviewed continuing chemo for another 2 months since all is going well. Pt is agreeable to this as long as she continues to tolerate it well. 2. Back pain resolved. s/p radiation to the spine for cord compression. 3. Headaches. Gone. Pt did not do brain MRI as felt symptoms were related to sinus. Call if questions. Follow-up in 4 weeks.

## 2017-08-31 ENCOUNTER — HOSPITAL ENCOUNTER (OUTPATIENT)
Dept: INFUSION THERAPY | Age: 57
Discharge: HOME | End: 2017-08-31
Payer: COMMERCIAL

## 2017-08-31 VITALS
HEIGHT: 67 IN | DIASTOLIC BLOOD PRESSURE: 66 MMHG | RESPIRATION RATE: 18 BRPM | BODY MASS INDEX: 22.13 KG/M2 | HEART RATE: 63 BPM | OXYGEN SATURATION: 100 % | WEIGHT: 141 LBS | SYSTOLIC BLOOD PRESSURE: 102 MMHG | TEMPERATURE: 97.3 F

## 2017-08-31 LAB
ALBUMIN SERPL-MCNC: 3.6 G/DL (ref 3.5–5)
ALBUMIN/GLOB SERPL: 0.9 {RATIO} (ref 1.1–2.2)
ALP SERPL-CCNC: 116 U/L (ref 45–117)
ALT SERPL-CCNC: 93 U/L (ref 12–78)
ANION GAP SERPL CALC-SCNC: 7 MMOL/L (ref 5–15)
AST SERPL-CCNC: 74 U/L (ref 15–37)
BASOPHILS # BLD: 0 K/UL (ref 0–0.1)
BASOPHILS NFR BLD: 1 % (ref 0–1)
BILIRUB SERPL-MCNC: 0.3 MG/DL (ref 0.2–1)
BUN SERPL-MCNC: 17 MG/DL (ref 6–20)
BUN/CREAT SERPL: 23 (ref 12–20)
CALCIUM SERPL-MCNC: 9.2 MG/DL (ref 8.5–10.1)
CHLORIDE SERPL-SCNC: 104 MMOL/L (ref 97–108)
CO2 SERPL-SCNC: 30 MMOL/L (ref 21–32)
CREAT SERPL-MCNC: 0.74 MG/DL (ref 0.55–1.02)
EOSINOPHIL # BLD: 0.1 K/UL (ref 0–0.4)
EOSINOPHIL NFR BLD: 1 % (ref 0–7)
ERYTHROCYTE [DISTWIDTH] IN BLOOD BY AUTOMATED COUNT: 14.2 % (ref 11.5–14.5)
GLOBULIN SER CALC-MCNC: 4 G/DL (ref 2–4)
GLUCOSE SERPL-MCNC: 77 MG/DL (ref 65–100)
HCT VFR BLD AUTO: 35.6 % (ref 35–47)
HGB BLD-MCNC: 11.9 G/DL (ref 11.5–16)
LYMPHOCYTES # BLD: 0.9 K/UL (ref 0.8–3.5)
LYMPHOCYTES NFR BLD: 22 % (ref 12–49)
MCH RBC QN AUTO: 30.6 PG (ref 26–34)
MCHC RBC AUTO-ENTMCNC: 33.4 G/DL (ref 30–36.5)
MCV RBC AUTO: 91.5 FL (ref 80–99)
MONOCYTES # BLD: 0.7 K/UL (ref 0–1)
MONOCYTES NFR BLD: 16 % (ref 5–13)
NEUTS SEG # BLD: 2.6 K/UL (ref 1.8–8)
NEUTS SEG NFR BLD: 60 % (ref 32–75)
PLATELET # BLD AUTO: 231 K/UL (ref 150–400)
POTASSIUM SERPL-SCNC: 4.2 MMOL/L (ref 3.5–5.1)
PROT SERPL-MCNC: 7.6 G/DL (ref 6.4–8.2)
RBC # BLD AUTO: 3.89 M/UL (ref 3.8–5.2)
SODIUM SERPL-SCNC: 141 MMOL/L (ref 136–145)
WBC # BLD AUTO: 4.3 K/UL (ref 3.6–11)

## 2017-08-31 RX ORDER — HEPARIN 100 UNIT/ML
500 SYRINGE INTRAVENOUS AS NEEDED
Status: ACTIVE | OUTPATIENT
Start: 2017-08-31 | End: 2017-09-01

## 2017-08-31 RX ORDER — SODIUM CHLORIDE 0.9 % (FLUSH) 0.9 %
5-10 SYRINGE (ML) INJECTION AS NEEDED
Status: ACTIVE | OUTPATIENT
Start: 2017-08-31 | End: 2017-09-01

## 2017-08-31 RX ORDER — SODIUM CHLORIDE 9 MG/ML
10 INJECTION INTRAMUSCULAR; INTRAVENOUS; SUBCUTANEOUS AS NEEDED
Status: DISPENSED | OUTPATIENT
Start: 2017-08-31 | End: 2017-09-01

## 2017-08-31 RX ADMIN — Medication 10 ML: at 10:50

## 2017-08-31 RX ADMIN — HEPARIN SODIUM (PORCINE) LOCK FLUSH IV SOLN 100 UNIT/ML 500 UNITS: 100 SOLUTION at 10:50

## 2017-08-31 RX ADMIN — ADO-TRASTUZUMAB EMTANSINE 200 MG: 20 INJECTION, POWDER, LYOPHILIZED, FOR SOLUTION INTRAVENOUS at 09:55

## 2017-08-31 RX ADMIN — SODIUM CHLORIDE 10 ML: 9 INJECTION INTRAMUSCULAR; INTRAVENOUS; SUBCUTANEOUS at 07:50

## 2017-08-31 NOTE — PROGRESS NOTES
[facility] VISIT NOTE    0745  Pt arrived at [facility] ambulatory and in no distress for Kadcyla C5 . Assessment completed, pt without complaints. Right chest port accessed with . 75 in low with no difficulty. Positive blood return noted and labs drawn. Pt refused decadron today before treatment. Medications received:  Kadcyla IV  NS IV    Patient Vitals for the past 12 hrs:   Temp Pulse Resp BP SpO2   08/31/17 1050 97.3 °F (36.3 °C) 63 18 102/66 -   08/31/17 0742 - 73 18 92/66 100 %     Recent Results (from the past 12 hour(s))   CBC WITH AUTOMATED DIFF    Collection Time: 08/31/17  8:06 AM   Result Value Ref Range    WBC 4.3 3.6 - 11.0 K/uL    RBC 3.89 3.80 - 5.20 M/uL    HGB 11.9 11.5 - 16.0 g/dL    HCT 35.6 35.0 - 47.0 %    MCV 91.5 80.0 - 99.0 FL    MCH 30.6 26.0 - 34.0 PG    MCHC 33.4 30.0 - 36.5 g/dL    RDW 14.2 11.5 - 14.5 %    PLATELET 533 314 - 784 K/uL    NEUTROPHILS 60 32 - 75 %    LYMPHOCYTES 22 12 - 49 %    MONOCYTES 16 (H) 5 - 13 %    EOSINOPHILS 1 0 - 7 %    BASOPHILS 1 0 - 1 %    ABS. NEUTROPHILS 2.6 1.8 - 8.0 K/UL    ABS. LYMPHOCYTES 0.9 0.8 - 3.5 K/UL    ABS. MONOCYTES 0.7 0.0 - 1.0 K/UL    ABS. EOSINOPHILS 0.1 0.0 - 0.4 K/UL    ABS. BASOPHILS 0.0 0.0 - 0.1 K/UL   METABOLIC PANEL, COMPREHENSIVE    Collection Time: 08/31/17  8:06 AM   Result Value Ref Range    Sodium 141 136 - 145 mmol/L    Potassium 4.2 3.5 - 5.1 mmol/L    Chloride 104 97 - 108 mmol/L    CO2 30 21 - 32 mmol/L    Anion gap 7 5 - 15 mmol/L    Glucose 77 65 - 100 mg/dL    BUN 17 6 - 20 MG/DL    Creatinine 0.74 0.55 - 1.02 MG/DL    BUN/Creatinine ratio 23 (H) 12 - 20      GFR est AA >60 >60 ml/min/1.73m2    GFR est non-AA >60 >60 ml/min/1.73m2    Calcium 9.2 8.5 - 10.1 MG/DL    Bilirubin, total 0.3 0.2 - 1.0 MG/DL    ALT (SGPT) 93 (H) 12 - 78 U/L    AST (SGOT) 74 (H) 15 - 37 U/L    Alk.  phosphatase 116 45 - 117 U/L    Protein, total 7.6 6.4 - 8.2 g/dL    Albumin 3.6 3.5 - 5.0 g/dL    Globulin 4.0 2.0 - 4.0 g/dL    A-G Ratio 0.9 (L) 1.1 - 2.2       Tolerated treatment well, no adverse reaction noted. Port de-accessed and flushed per protocol. Positive blood return noted. 1055  D/C'd from [facility] ambulatory and in no distress accompanied by daughter in law.  Next appointment is 9/18/17 at [phone]

## 2017-08-31 NOTE — PROGRESS NOTES
Problem: Chemotherapy Treatment  Goal: *Chemotherapy regimen followed  Outcome: Progressing Towards Goal  Pt receiving Kadcyla C5

## 2017-09-18 ENCOUNTER — APPOINTMENT (OUTPATIENT)
Dept: INFUSION THERAPY | Age: 57
End: 2017-09-18
Payer: COMMERCIAL

## 2017-09-18 RX ORDER — DEXAMETHASONE SODIUM PHOSPHATE 4 MG/ML
8 INJECTION, SOLUTION INTRA-ARTICULAR; INTRALESIONAL; INTRAMUSCULAR; INTRAVENOUS; SOFT TISSUE ONCE
Status: ACTIVE | OUTPATIENT
Start: 2017-01-01 | End: 2017-01-01

## 2017-09-18 RX ORDER — SODIUM CHLORIDE 9 MG/ML
25 INJECTION, SOLUTION INTRAVENOUS CONTINUOUS
Status: DISPENSED | OUTPATIENT
Start: 2017-01-01 | End: 2017-01-01

## 2017-09-18 RX ORDER — PROCHLORPERAZINE EDISYLATE 5 MG/ML
10 INJECTION INTRAMUSCULAR; INTRAVENOUS
Status: ACTIVE | OUTPATIENT
Start: 2017-01-01 | End: 2017-01-01

## 2017-09-22 NOTE — PROGRESS NOTES
[facility] - Chemotherapy Progress Note    1310 Pt admit to [facility] for Kadcyla C6 ambulatory in stable condition. Assessment completed. No new concerns voiced. Right chest  with positive blood return. Chemotherapy Flowsheet 9/22/2017   Cycle C6   Date 9/22/2017   Drug / Regimen Kadcyla   Notes -         Patient Vitals for the past 12 hrs:   Temp Pulse BP SpO2   09/22/17 1524 - 74 99/68 100 %   09/22/17 1320 97.7 °F (36.5 °C) 75 98/60 97 %    declines pregnancy    Medications:  Kadcyla  Pt refused decadron    1530 Pt tolerated treatment well. R chest port maintained positive blood return throughout treatment, flushed with positive blood return at conclusion and 1530. D/c home ambulatory in no distress. Pt aware of next appointment scheduled for 10/12/17 at 1300. Recent Results (from the past 12 hour(s))   METABOLIC PANEL, COMPREHENSIVE    Collection Time: 09/22/17  1:25 PM   Result Value Ref Range    Sodium 141 136 - 145 mmol/L    Potassium 3.7 3.5 - 5.1 mmol/L    Chloride 104 97 - 108 mmol/L    CO2 30 21 - 32 mmol/L    Anion gap 7 5 - 15 mmol/L    Glucose 108 (H) 65 - 100 mg/dL    BUN 14 6 - 20 MG/DL    Creatinine 0.74 0.55 - 1.02 MG/DL    BUN/Creatinine ratio 19 12 - 20      GFR est AA >60 >60 ml/min/1.73m2    GFR est non-AA >60 >60 ml/min/1.73m2    Calcium 8.7 8.5 - 10.1 MG/DL    Bilirubin, total 0.3 0.2 - 1.0 MG/DL    ALT (SGPT) 120 (H) 12 - 78 U/L    AST (SGOT) 93 (H) 15 - 37 U/L    Alk.  phosphatase 167 (H) 45 - 117 U/L    Protein, total 7.4 6.4 - 8.2 g/dL    Albumin 3.6 3.5 - 5.0 g/dL    Globulin 3.8 2.0 - 4.0 g/dL    A-G Ratio 0.9 (L) 1.1 - 2.2     CBC WITH 3 PART DIFF    Collection Time: 09/22/17  1:26 PM   Result Value Ref Range    WBC 4.6 3.6 - 11.0 K/uL    RBC 3.78 (L) 3.80 - 5.20 M/uL    HGB 12.1 11.5 - 16.0 g/dL    HCT 34.8 (L) 35.0 - 47.0 %    MCV 92.1 80.0 - 99.0 FL    MCH 32.0 26.0 - 34.0 PG    MCHC 34.8 30.0 - 36.5 g/dL    RDW 14.0 11.8 - 15.8 %    PLATELET 572 314 - 792 K/uL NEUTROPHILS 59 32 - 75 %    MIXED CELLS 13 3.2 - 16.9 %    LYMPHOCYTES 28 12 - 49 %    ABS. NEUTROPHILS 2.7 1.8 - 8.0 K/UL    ABS. MIXED CELLS 0.6 0.2 - 1.2 K/uL    ABS.  LYMPHOCYTES 1.3 0.8 - 3.5 K/UL    DF HAL Lopez

## 2017-10-10 NOTE — PROGRESS NOTES
Patient has order for ECHO & next due in November. States she will call to schedule this at [facility]. Will call this office if assist is needed.

## 2017-10-10 NOTE — PROGRESS NOTES
HEME/ONC CONSULT       Dimple Daly is a 64 y.o. 1960 female and presents with Breast Cancer    CC  Breast cancer 2004    HPI: Ms. Shalini Mcdonald is here today for follow-up for stage 4 breast cancer ER+ HER2 + on kadcyla. Pt is feeling really well overall. Tolerating chemo. Next treatment next thurs then pt wants a break. CTs 8/17 showed good response to chemo. Wants to do f/u CTs in 11/17. Pt asking about immunotherapy. Last path was 2010 at [address] [place] [address]. Still needs to get a PCP and wants integrative doc. DX   Encounter Diagnoses   Name Primary?  Malignant neoplasm of breast, stage 4, unspecified laterality ([address] Yes    Liver metastases ([address]     Bone metastases ([address]     Chemotherapy follow-up examination         STAGE: 4 liver and bones    TREATMENT COURSE: kadcyla started 6/17.   4/2004, s/p L mastectomy with Dr. Jane Alford at [facility]. Stage IIB, T2N1M0, 3/39 LN +, ER negative, MA +, HER 2 +, was enrolled on NSABP B-31 trial with adriamycin/cytoxan q 3 weeks x 4 followed by taxol weekly with herceptin x 12 then completion of a year of herceptin. States she did not receive XRT. Did receive 5 years of tamoxifen from 8360-9216, which she tolerated well. In 2009 she noticed a chest wall mass. 11/23/10 FNA of chest wall mass showed ER + > 90%, MA + > 90%, MIB 50%, HER 2 + by FISH ratio 6.29. S/p BSO 12/27/10. Received zometa from 2010 to 2011. In 2011, she was started on q 3 week trastuzumab and exemestane, but states she took the exemestane sparingly due to joint pains and body aches. Has not taken any AI since 2013. Briefly took anastrozole in 5/2014. Stopped anastrozole 1/1/15 due to continued body and joint aches. Started Herceptin 1/9/17, stopped 4/25/17 due to progressive disease noted on scans  Started Kadcyla 3.6 mg/kg every 21 days on 6/1/17, reduced to 3 mg/kg due to side effects on 6/26/17      No past medical history on file.   Past Surgical History:   Procedure Laterality Date    BREAST SURGERY PROCEDURE UNLISTED  2004    mastectomy    HX GYN  2010    ovaries removed     Social History     Social History    Marital status:      Spouse name: N/A    Number of children: N/A    Years of education: N/A     Social History Main Topics    Smoking status: Never Smoker    Smokeless tobacco: Never Used    Alcohol use Yes      Comment: 3-4 drinks/month    Drug use: No    Sexual activity: No     Other Topics Concern    None     Social History Narrative     Family History   Problem Relation Age of Onset    Hypertension Mother     Cancer Father      mesothelioma    Cancer Maternal Grandfather 76     Bladder       Current Outpatient Prescriptions   Medication Sig Dispense Refill    OTHER CBD oil, take 40 mg at bedtime      OTHER Indications: Organic Sulfa- Powder mix with liquids  , 4x per day      ascorbic acid, vitamin C, (VITAMIN C) 250 mg tablet Take 250 mg by mouth daily.  IODINE PO Take  by mouth.  BETA 1,3 GLUCAN, BULK, 3 Tabs by Does Not Apply route daily.  OTHER,NON-FORMULARY, Mushroom extract twice daily; Crocifurous extract      TURMERIC, BULK, by Does Not Apply route daily.  cholecalciferol (VITAMIN D3) 1,000 unit tablet Take 4,000 Units by mouth daily.  multivitamin (ONE A DAY) tablet Take 1 tablet by mouth daily.  coenzyme q10 (CO Q-10) 10 mg cap Take  by mouth daily.  LACTOBAC CMB #3/FOS/PANTETHINE (PROBIOTIC & ACIDOPHILUS PO) Take  by mouth daily.  magnesium 250 mg tab Take 500 mg by mouth daily.  MILK THISTLE, BULK, by Does Not Apply route.        Facility-Administered Medications Ordered in Other Visits   Medication Dose Route Frequency Provider Last Rate Last Dose    [START ON 10/12/2017] ADO-trastuzumab emtansine (KADCYLA) 200 mg in 0.9% sodium chloride 250 mL, overfill volume 25 mL Chemo infusion  200 mg IntraVENous ONCE Jessica Cabello DO        [START ON 10/12/2017] 0.9% sodium chloride infusion  25 mL/hr IntraVENous CONTINUOUS Norrine Mando Cabello DO        [START ON 10/12/2017] dexamethasone (DECADRON) 4 mg/mL injection 8 mg  8 mg IntraVENous ONCE Norrine Mando Preciadoro tamika DO        [START ON 10/12/2017] prochlorperazine (COMPAZINE) injection 10 mg  10 mg IntraVENous Q6H PRN Linda Console, DO           Allergies   Allergen Reactions    Pcn [Penicillins] Anaphylaxis    Contrast Agent [Iodine] Shortness of Breath and Itching     Patient reported during phone conversation on 4/18/17. Review of Systems    A comprehensive review of systems was negative except for: per HPI     Objective:  Visit Vitals    /67    Pulse 80    Temp 98.4 °F (36.9 °C) (Oral)    Resp 16    Ht 5' 6\" (1.676 m)    Wt 148 lb 6.4 oz (67.3 kg)    SpO2 98%    BMI 23.95 kg/m2     Physical Exam:   General appearance - alert, well appearing, and in no distress, oriented to person, place, and time and normal appearing weight  Mental status - alert, oriented to person, place, and time, normal mood, behavior, speech, dress, motor activity, and thought processes  EYE-conj clear  Mouth - mucous membranes pink, moist, intact  Neck - supple, no adenopathy appreciated  Chest - clear to auscultation bilaterally  Heart - normal rate and regular rhythm   Abdomen - round, soft, non-tender  Ext-no pedal edema noted  Skin-Warm and dry. Intact without rash. Neuro -alert, oriented, normal speech, no focal findings or movement disorder noted    Diagnostic Imaging   reviewed    Results for orders placed during the hospital encounter of 08/20/17   CT ABD PELV WO CONT    Narrative INDICATION:   Metastatic breast cancer follow-up     EXAM:  CT chest, abdomen, and pelvis without CONTRAST    COMPARISON:  8/10/2015, PET/CT 4/26/2017    TECHNIQUE:  Thin collimation axial images were obtained through the chest,  abdomen, and pelvis without IV contrast administration. Coronal and sagittal  reconstructions were obtained.  Oral contrast was administered. CT dose  reduction was achieved through use of a standardized protocol tailored for this  examination and automatic exposure control for dose modulation. FINDINGS:    Chest:    LUNGS: Overall, significant improvement in small hypermetabolic bilateral lung  nodules. Stable small nodular/groundglass opacity in the left lower lobe  measuring 5 mm (series 3, image 52). Decrease in size of ill-defined small  nodular/groundglass opacity in the anterior right middle lobe (series 3, image  34). Medial right lower lobe atelectasis and/or scarring. No other significant  lung abnormality. No evidence for new focal lung lesion. The central airways are  patent  LYMPH NODES: No significant change in size of prominent to borderline enlarged  numerous mediastinal and bilateral hilar lymph nodes which were hypermetabolic  on the prior PET/CT. Slightly limited evaluation of the hilar lymph nodes  without intravenous contrast administration. No evidence for any new thoracic  lymphadenopathy. Sequelae of left axillary lymph node dissection with prominent  lymph nodes is unchanged. PLEURAL FLUID: No pleural effusion. PERICARDIAL FLUID: No pericardial effusion. THYROID: No thyroid nodule. OTHER: Right chest port extends to the atriocaval junction. Left breast  prosthesis is seen. Abdomen: Lack of IV contrast limits evaluation of the solid abdominal organs. LIVER: Numerous ill-defined low density liver metastases are again seen and  appear smaller in size compared to the prior PET/CT on 4/26/2017. A right  hepatic lobe lesion measures 7 mm (series 2, image 68), compared to 15 mm  previously. A left hepatic lobe lesion measures 7 mm (image 58), compared to 18  mm previously. No biliary ductal dilatation. GALLBLADDER: No calcified gallstone  SPLEEN: Unremarkable  PANCREAS: No mass or ductal dilatation. ADRENALS: Unremarkable. KIDNEYS/URETERS: Punctate nonobstructing left renal calculus.  No focal renal  abnormality or hydronephrosis. PERITONEUM: No ascites. No abdominal lymphadenopathy by CT criteria. Prominent  and borderline enlarged periaortic hypermetabolic retroperitoneal lymph nodes  are not significantly changed. COLON: No dilatation or wall thickening. APPENDIX: Not clearly seen  SMALL BOWEL: No dilatation or wall thickening. STOMACH: Unremarkable. Pelvis:      PELVIS: No pelvic lymphadenopathy or free fluid. BONES: Diffuse sclerotic osseous metastases are seen, progressed since 8/10/2015  with probably no significant change compared to 4/26/2017. Unchanged pathologic  vertebral body compression deformity involving T9.  ADDITIONAL COMMENTS: N/A      Impression IMPRESSION:    Partial response to therapy with decrease in size of pulmonary and hepatic  metastases        Lab Results  reviewed  Lab Results   Component Value Date/Time    WBC 4.6 09/22/2017 01:26 PM    HGB 12.1 09/22/2017 01:26 PM    HCT 34.8 09/22/2017 01:26 PM    PLATELET 396 19/00/3648 01:26 PM    MCV 92.1 09/22/2017 01:26 PM     Lab Results   Component Value Date/Time    Sodium 141 09/22/2017 01:25 PM    Potassium 3.7 09/22/2017 01:25 PM    Chloride 104 09/22/2017 01:25 PM    CO2 30 09/22/2017 01:25 PM    Anion gap 7 09/22/2017 01:25 PM    Glucose 108 09/22/2017 01:25 PM    BUN 14 09/22/2017 01:25 PM    Creatinine 0.74 09/22/2017 01:25 PM    BUN/Creatinine ratio 19 09/22/2017 01:25 PM    GFR est AA >60 09/22/2017 01:25 PM    GFR est non-AA >60 09/22/2017 01:25 PM    Calcium 8.7 09/22/2017 01:25 PM    AST (SGOT) 93 09/22/2017 01:25 PM    Alk. phosphatase 167 09/22/2017 01:25 PM    Protein, total 7.4 09/22/2017 01:25 PM    Albumin 3.6 09/22/2017 01:25 PM    Globulin 3.8 09/22/2017 01:25 PM    A-G Ratio 0.9 09/22/2017 01:25 PM    ALT (SGPT) 120 09/22/2017 01:25 PM       Assessment/Plan:    1.   Stage 4 breast cancer ER+ HER2 amplified at recurrence with mets to liver and bones in past.   Patient presented in 12/2016 with worsening back pain. MRI spine showed cord compression. CT's showed worsened bone and liver disease. She completed radiation to the spine. Pt preferred to do herceptin only and f/u PET on herceptin (in April 2017) looked worse with progressive disease in lungs/ liver/ bones. She is now on Kadcyla and is tolerating it well. CT's 8/17 show good response to treatment with decreased liver leisons and stable bones. Pt is feeling well overall. Wants to do chemo this week and then take a break due to side effects. Wants to have CT f/u 11/17 and will order. 2. Back pain resolved. s/p radiation to the spine for cord compression. 3. Headaches. Gone. Pt did not do brain MRI as felt symptoms were related to sinus. 4.  Pt works in a school. Good support. Wants integrative internist and discussed options today. Call if questions. Follow-up in 4 weeks.

## 2017-10-11 NOTE — TELEPHONE ENCOUNTER
Patient's pathology slides requested from [facility] per providers request.    Add on pathology paperwork completed for [facility] per provider; paperwork faxed to [facility] then paperwork given to YOLANDA SANDHU to scan.

## 2017-10-12 NOTE — PROGRESS NOTES
[facility] Progress Note    Date: 2017    Name: Gilberto Aguirre    MRN: [id number]         : 1960    Ms. Graham Arrived ambulatory and in no distress for cycle 7  of Kadcyla regimen. Assessment was completed, no acute issues at this time, no new complaints voiced. R chest PAC accessed without difficulty, labs drawn and in process. Chemotherapy Flowsheet 10/12/2017   Cycle C7   Date 10/12/2017   Drug / Regimen Kadcyla   Notes -       Ms. Graham's vitals were reviewed. Patient Vitals for the past 12 hrs:   Temp Pulse BP SpO2   10/12/17 1540 97 °F (36.1 °C) 70 102/60 98 %   10/12/17 1324 97.2 °F (36.2 °C) 82 112/70 98 %     Lab results were obtained and reviewed. Recent Results (from the past 12 hour(s))   CBC WITH 3 PART DIFF    Collection Time: 10/12/17  1:34 PM   Result Value Ref Range    WBC 4.6 3.6 - 11.0 K/uL    RBC 3.91 3.80 - 5.20 M/uL    HGB 12.2 11.5 - 16.0 g/dL    HCT 36.2 35.0 - 47.0 %    MCV 92.6 80.0 - 99.0 FL    MCH 31.2 26.0 - 34.0 PG    MCHC 33.7 30.0 - 36.5 g/dL    RDW 14.5 11.8 - 15.8 %    PLATELET 247 376 - 452 K/uL    NEUTROPHILS 61 32 - 75 %    MIXED CELLS 11 3.2 - 16.9 %    LYMPHOCYTES 28 12 - 49 %    ABS. NEUTROPHILS 2.8 1.8 - 8.0 K/UL    ABS. MIXED CELLS 0.5 0.2 - 1.2 K/uL    ABS. LYMPHOCYTES 1.3 0.8 - 3.5 K/UL    DF AUTOMATED     METABOLIC PANEL, COMPREHENSIVE    Collection Time: 10/12/17  1:34 PM   Result Value Ref Range    Sodium 142 136 - 145 mmol/L    Potassium 3.8 3.5 - 5.1 mmol/L    Chloride 105 97 - 108 mmol/L    CO2 28 21 - 32 mmol/L    Anion gap 9 5 - 15 mmol/L    Glucose 108 (H) 65 - 100 mg/dL    BUN 14 6 - 20 MG/DL    Creatinine 0.62 0.55 - 1.02 MG/DL    BUN/Creatinine ratio 23 (H) 12 - 20      GFR est AA >60 >60 ml/min/1.73m2    GFR est non-AA >60 >60 ml/min/1.73m2    Calcium 8.9 8.5 - 10.1 MG/DL    Bilirubin, total 0.3 0.2 - 1.0 MG/DL    ALT (SGPT) 120 (H) 12 - 78 U/L    AST (SGOT) 99 (H) 15 - 37 U/L    Alk.  phosphatase 213 (H) 45 - 117 U/L    Protein, total 7.7 6.4 - 8.2 g/dL    Albumin 3.6 3.5 - 5.0 g/dL    Globulin 4.1 (H) 2.0 - 4.0 g/dL    A-G Ratio 0.9 (L) 1.1 - 2.2       Pre-medications  were administered as ordered and chemotherapy was initiated.     kadcyla IV  (decadron was refused by patient)      Ms. Mayela Mitchell tolerated treatment well and was discharged from [place] [address] in stable condition at 1545. She is to return on november to see MD, patient is taking a break per MD note. Patient stated she would call MD office and make apt.     Robert De La Fuente  October 12, 2017

## 2017-11-13 NOTE — TELEPHONE ENCOUNTER
Returned call to patient. Discussed that CT's are stable per Dr. Shaniqua Mathews but are limited by lack of contrast. Reviewed need for follow-up appointment here and she states she will call tomorrow to schedule an appointment when she has her calendar available.

## 2017-11-13 NOTE — TELEPHONE ENCOUNTER
----- Message from Ginger De La Rosa DO sent at 11/13/2017  4:10 PM EST -----  Tell pt CTs look good (no contrast so limited overall)  But liver lesions good/ maybe decreased  Bones probably stable  Needs xgeva  Needs f/u with me

## 2017-11-13 NOTE — PROGRESS NOTES
Tell pt CTs look good (no contrast so limited overall)  But liver lesions good/ maybe decreased  Bones probably stable  Needs xgeva  Needs f/u with me

## 2017-11-13 NOTE — TELEPHONE ENCOUNTER
Patient would like to speak to someone over the phone regarding her CT results. Please return patient's call to discuss [phone].   tri

## 2018-01-01 ENCOUNTER — HOME HEALTH ADMISSION (OUTPATIENT)
Dept: HOME HEALTH SERVICES | Facility: HOME HEALTH | Age: 58
End: 2018-01-01

## 2018-01-01 ENCOUNTER — OFFICE VISIT (OUTPATIENT)
Dept: ONCOLOGY | Age: 58
End: 2018-01-01

## 2018-01-01 ENCOUNTER — TELEPHONE (OUTPATIENT)
Dept: ONCOLOGY | Age: 58
End: 2018-01-01

## 2018-01-01 ENCOUNTER — DOCUMENTATION ONLY (OUTPATIENT)
Dept: ONCOLOGY | Age: 58
End: 2018-01-01

## 2018-01-01 ENCOUNTER — APPOINTMENT (OUTPATIENT)
Dept: MRI IMAGING | Age: 58
End: 2018-01-01
Attending: EMERGENCY MEDICINE
Payer: COMMERCIAL

## 2018-01-01 ENCOUNTER — APPOINTMENT (OUTPATIENT)
Dept: CT IMAGING | Age: 58
DRG: 025 | End: 2018-01-01
Attending: HOSPITALIST
Payer: COMMERCIAL

## 2018-01-01 ENCOUNTER — APPOINTMENT (OUTPATIENT)
Dept: GENERAL RADIOLOGY | Age: 58
DRG: 178 | End: 2018-01-01
Attending: EMERGENCY MEDICINE
Payer: COMMERCIAL

## 2018-01-01 ENCOUNTER — HOSPITAL ENCOUNTER (OUTPATIENT)
Dept: MRI IMAGING | Age: 58
Discharge: HOME | End: 2018-02-16
Attending: NURSE PRACTITIONER
Payer: COMMERCIAL

## 2018-01-01 ENCOUNTER — DOCUMENTATION ONLY (OUTPATIENT)
Dept: CARDIOLOGY CLINIC | Age: 58
End: 2018-01-01

## 2018-01-01 ENCOUNTER — PATIENT OUTREACH (OUTPATIENT)
Dept: ONCOLOGY | Age: 58
End: 2018-01-01

## 2018-01-01 ENCOUNTER — CLINICAL SUPPORT (OUTPATIENT)
Dept: CARDIOLOGY CLINIC | Age: 58
End: 2018-01-01

## 2018-01-01 ENCOUNTER — HOSPITAL ENCOUNTER (EMERGENCY)
Age: 58
Discharge: HOME | End: 2018-08-06
Attending: EMERGENCY MEDICINE
Payer: COMMERCIAL

## 2018-01-01 ENCOUNTER — APPOINTMENT (OUTPATIENT)
Dept: GENERAL RADIOLOGY | Age: 58
DRG: 178 | End: 2018-01-01
Attending: INTERNAL MEDICINE
Payer: COMMERCIAL

## 2018-01-01 ENCOUNTER — HOSPITAL ENCOUNTER (OUTPATIENT)
Dept: MRI IMAGING | Age: 58
Discharge: HOME | End: 2018-08-23
Attending: NEUROLOGICAL SURGERY
Payer: COMMERCIAL

## 2018-01-01 ENCOUNTER — HOSPITAL ENCOUNTER (OUTPATIENT)
Dept: RADIATION THERAPY | Age: 58
Discharge: HOME | End: 2018-03-19

## 2018-01-01 ENCOUNTER — HOSPITAL ENCOUNTER (OUTPATIENT)
Dept: NON INVASIVE DIAGNOSTICS | Age: 58
Discharge: HOME | End: 2018-04-11
Attending: NURSE PRACTITIONER
Payer: COMMERCIAL

## 2018-01-01 ENCOUNTER — HOSPITAL ENCOUNTER (OUTPATIENT)
Dept: CT IMAGING | Age: 58
Discharge: HOME | End: 2018-08-23
Attending: INTERNAL MEDICINE
Payer: COMMERCIAL

## 2018-01-01 ENCOUNTER — HOSPITAL ENCOUNTER (INPATIENT)
Age: 58
LOS: 5 days | Discharge: HOME | DRG: 025 | End: 2018-02-21
Attending: EMERGENCY MEDICINE | Admitting: INTERNAL MEDICINE
Payer: COMMERCIAL

## 2018-01-01 ENCOUNTER — HOSPITAL ENCOUNTER (OUTPATIENT)
Dept: MRI IMAGING | Age: 58
Discharge: HOME | End: 2018-05-24
Attending: INTERNAL MEDICINE
Payer: COMMERCIAL

## 2018-01-01 ENCOUNTER — APPOINTMENT (OUTPATIENT)
Dept: CT IMAGING | Age: 58
DRG: 025 | End: 2018-01-01
Attending: NEUROLOGICAL SURGERY
Payer: COMMERCIAL

## 2018-01-01 ENCOUNTER — HOSPITAL ENCOUNTER (OUTPATIENT)
Dept: RADIATION THERAPY | Age: 58
Discharge: HOME | End: 2018-09-07

## 2018-01-01 ENCOUNTER — HOSPITAL ENCOUNTER (OUTPATIENT)
Dept: NON INVASIVE DIAGNOSTICS | Age: 58
Discharge: HOME | End: 2018-07-12
Attending: NURSE PRACTITIONER
Payer: COMMERCIAL

## 2018-01-01 ENCOUNTER — ANESTHESIA EVENT (OUTPATIENT)
Dept: SURGERY | Age: 58
DRG: 025 | End: 2018-01-01
Payer: COMMERCIAL

## 2018-01-01 ENCOUNTER — HOSPITAL ENCOUNTER (OUTPATIENT)
Age: 58
Setting detail: OUTPATIENT SURGERY
Discharge: HOME | End: 2018-07-17
Attending: INTERNAL MEDICINE | Admitting: INTERNAL MEDICINE
Payer: COMMERCIAL

## 2018-01-01 ENCOUNTER — ANESTHESIA (OUTPATIENT)
Dept: SURGERY | Age: 58
DRG: 025 | End: 2018-01-01
Payer: COMMERCIAL

## 2018-01-01 ENCOUNTER — HOSPITAL ENCOUNTER (OUTPATIENT)
Dept: CT IMAGING | Age: 58
Discharge: HOME | End: 2018-05-10
Attending: NURSE PRACTITIONER
Payer: COMMERCIAL

## 2018-01-01 ENCOUNTER — APPOINTMENT (OUTPATIENT)
Dept: MRI IMAGING | Age: 58
DRG: 025 | End: 2018-01-01
Attending: HOSPITALIST
Payer: COMMERCIAL

## 2018-01-01 ENCOUNTER — HOSPITAL ENCOUNTER (OUTPATIENT)
Dept: INFUSION THERAPY | Age: 58
Discharge: HOME | End: 2018-04-06
Payer: COMMERCIAL

## 2018-01-01 ENCOUNTER — APPOINTMENT (OUTPATIENT)
Dept: CT IMAGING | Age: 58
DRG: 178 | End: 2018-01-01
Attending: INTERNAL MEDICINE
Payer: COMMERCIAL

## 2018-01-01 ENCOUNTER — HOSPITAL ENCOUNTER (INPATIENT)
Age: 58
LOS: 3 days | Discharge: HOME HEALTH | DRG: 178 | End: 2018-09-17
Attending: EMERGENCY MEDICINE | Admitting: INTERNAL MEDICINE
Payer: COMMERCIAL

## 2018-01-01 ENCOUNTER — HOSPITAL ENCOUNTER (OUTPATIENT)
Dept: CT IMAGING | Age: 58
Discharge: HOME | End: 2018-07-12
Attending: INTERNAL MEDICINE
Payer: COMMERCIAL

## 2018-01-01 ENCOUNTER — APPOINTMENT (OUTPATIENT)
Dept: NUCLEAR MEDICINE | Age: 58
DRG: 178 | End: 2018-01-01
Attending: EMERGENCY MEDICINE
Payer: COMMERCIAL

## 2018-01-01 VITALS
BODY MASS INDEX: 22.6 KG/M2 | HEART RATE: 100 BPM | RESPIRATION RATE: 16 BRPM | SYSTOLIC BLOOD PRESSURE: 91 MMHG | WEIGHT: 144 LBS | HEIGHT: 67 IN | DIASTOLIC BLOOD PRESSURE: 57 MMHG | OXYGEN SATURATION: 98 %

## 2018-01-01 VITALS
HEIGHT: 67 IN | BODY MASS INDEX: 22.6 KG/M2 | RESPIRATION RATE: 16 BRPM | WEIGHT: 144 LBS | SYSTOLIC BLOOD PRESSURE: 104 MMHG | OXYGEN SATURATION: 98 % | DIASTOLIC BLOOD PRESSURE: 71 MMHG | HEART RATE: 78 BPM | TEMPERATURE: 98.1 F

## 2018-01-01 VITALS
HEART RATE: 117 BPM | DIASTOLIC BLOOD PRESSURE: 48 MMHG | OXYGEN SATURATION: 96 % | TEMPERATURE: 97.7 F | SYSTOLIC BLOOD PRESSURE: 99 MMHG | WEIGHT: 135.2 LBS | RESPIRATION RATE: 25 BRPM | BODY MASS INDEX: 21.73 KG/M2 | HEIGHT: 66 IN

## 2018-01-01 VITALS
BODY MASS INDEX: 23.3 KG/M2 | HEART RATE: 86 BPM | OXYGEN SATURATION: 97 % | DIASTOLIC BLOOD PRESSURE: 63 MMHG | SYSTOLIC BLOOD PRESSURE: 96 MMHG | RESPIRATION RATE: 24 BRPM | HEIGHT: 66 IN | WEIGHT: 145 LBS | TEMPERATURE: 98.3 F

## 2018-01-01 VITALS
TEMPERATURE: 97.7 F | RESPIRATION RATE: 16 BRPM | WEIGHT: 147.8 LBS | BODY MASS INDEX: 23.2 KG/M2 | HEART RATE: 85 BPM | HEIGHT: 67 IN | SYSTOLIC BLOOD PRESSURE: 94 MMHG | DIASTOLIC BLOOD PRESSURE: 66 MMHG | OXYGEN SATURATION: 97 %

## 2018-01-01 VITALS
WEIGHT: 140 LBS | HEART RATE: 98 BPM | RESPIRATION RATE: 18 BRPM | SYSTOLIC BLOOD PRESSURE: 101 MMHG | HEIGHT: 66 IN | DIASTOLIC BLOOD PRESSURE: 67 MMHG | OXYGEN SATURATION: 97 % | BODY MASS INDEX: 22.5 KG/M2 | TEMPERATURE: 98.6 F

## 2018-01-01 VITALS
WEIGHT: 145 LBS | RESPIRATION RATE: 16 BRPM | DIASTOLIC BLOOD PRESSURE: 75 MMHG | SYSTOLIC BLOOD PRESSURE: 111 MMHG | TEMPERATURE: 98.2 F | OXYGEN SATURATION: 97 % | HEART RATE: 89 BPM | BODY MASS INDEX: 22.76 KG/M2 | HEIGHT: 67 IN

## 2018-01-01 VITALS — WEIGHT: 140 LBS | BODY MASS INDEX: 22.6 KG/M2

## 2018-01-01 VITALS
OXYGEN SATURATION: 98 % | TEMPERATURE: 98.2 F | WEIGHT: 141 LBS | SYSTOLIC BLOOD PRESSURE: 105 MMHG | RESPIRATION RATE: 16 BRPM | DIASTOLIC BLOOD PRESSURE: 70 MMHG | HEIGHT: 66 IN | HEART RATE: 101 BPM | BODY MASS INDEX: 22.66 KG/M2

## 2018-01-01 VITALS
HEART RATE: 89 BPM | RESPIRATION RATE: 16 BRPM | OXYGEN SATURATION: 96 % | DIASTOLIC BLOOD PRESSURE: 73 MMHG | WEIGHT: 143 LBS | SYSTOLIC BLOOD PRESSURE: 107 MMHG | BODY MASS INDEX: 22.44 KG/M2 | HEIGHT: 67 IN | TEMPERATURE: 97.7 F

## 2018-01-01 VITALS
WEIGHT: 148 LBS | BODY MASS INDEX: 23.78 KG/M2 | HEIGHT: 66 IN | OXYGEN SATURATION: 98 % | TEMPERATURE: 98.2 F | DIASTOLIC BLOOD PRESSURE: 63 MMHG | RESPIRATION RATE: 16 BRPM | SYSTOLIC BLOOD PRESSURE: 98 MMHG | HEART RATE: 100 BPM

## 2018-01-01 VITALS
TEMPERATURE: 98.6 F | OXYGEN SATURATION: 97 % | WEIGHT: 147.3 LBS | DIASTOLIC BLOOD PRESSURE: 68 MMHG | HEART RATE: 91 BPM | BODY MASS INDEX: 23.12 KG/M2 | HEIGHT: 67 IN | SYSTOLIC BLOOD PRESSURE: 96 MMHG | RESPIRATION RATE: 24 BRPM

## 2018-01-01 VITALS
DIASTOLIC BLOOD PRESSURE: 66 MMHG | HEART RATE: 89 BPM | SYSTOLIC BLOOD PRESSURE: 96 MMHG | TEMPERATURE: 97.6 F | RESPIRATION RATE: 16 BRPM

## 2018-01-01 DIAGNOSIS — C79.31 BRAIN METASTASIS (HCC): ICD-10-CM

## 2018-01-01 DIAGNOSIS — C50.919 MALIGNANT NEOPLASM OF FEMALE BREAST, UNSPECIFIED ESTROGEN RECEPTOR STATUS, UNSPECIFIED LATERALITY, UNSPECIFIED SITE OF BREAST (HCC): ICD-10-CM

## 2018-01-01 DIAGNOSIS — C79.51 BONE METASTASES (HCC): ICD-10-CM

## 2018-01-01 DIAGNOSIS — C50.919 MALIGNANT NEOPLASM OF BREAST, STAGE 4, UNSPECIFIED LATERALITY (HCC): Primary | ICD-10-CM

## 2018-01-01 DIAGNOSIS — Z09 CHEMOTHERAPY FOLLOW-UP EXAMINATION: ICD-10-CM

## 2018-01-01 DIAGNOSIS — C78.7 LIVER METASTASES (HCC): ICD-10-CM

## 2018-01-01 DIAGNOSIS — R53.0 NEOPLASTIC MALIGNANT RELATED FATIGUE: ICD-10-CM

## 2018-01-01 DIAGNOSIS — Z51.81 ENCOUNTER FOR MONITORING CARDIOTOXIC DRUG THERAPY: ICD-10-CM

## 2018-01-01 DIAGNOSIS — G93.89 BRAIN MASS: ICD-10-CM

## 2018-01-01 DIAGNOSIS — R06.09 DOE (DYSPNEA ON EXERTION): ICD-10-CM

## 2018-01-01 DIAGNOSIS — R06.02 SOB (SHORTNESS OF BREATH): ICD-10-CM

## 2018-01-01 DIAGNOSIS — M54.6 CHRONIC LEFT-SIDED THORACIC BACK PAIN: ICD-10-CM

## 2018-01-01 DIAGNOSIS — C50.919 MALIGNANT NEOPLASM OF BREAST, STAGE 4, UNSPECIFIED LATERALITY (HCC): ICD-10-CM

## 2018-01-01 DIAGNOSIS — R05.9 COUGH: ICD-10-CM

## 2018-01-01 DIAGNOSIS — C79.31 SECONDARY MALIGNANT NEOPLASM OF BRAIN AND SPINAL CORD (HCC): ICD-10-CM

## 2018-01-01 DIAGNOSIS — R51.9 NONINTRACTABLE HEADACHE, UNSPECIFIED CHRONICITY PATTERN, UNSPECIFIED HEADACHE TYPE: ICD-10-CM

## 2018-01-01 DIAGNOSIS — R11.0 NAUSEA: ICD-10-CM

## 2018-01-01 DIAGNOSIS — R93.1 DECREASED CARDIAC EJECTION FRACTION: Primary | ICD-10-CM

## 2018-01-01 DIAGNOSIS — C50.912 MALIGNANT NEOPLASM OF LEFT BREAST, STAGE 4 (HCC): ICD-10-CM

## 2018-01-01 DIAGNOSIS — F41.8 ANXIETY ABOUT HEALTH: ICD-10-CM

## 2018-01-01 DIAGNOSIS — Z79.899 ENCOUNTER FOR MONITORING CARDIOTOXIC DRUG THERAPY: ICD-10-CM

## 2018-01-01 DIAGNOSIS — C50.912 MALIGNANT NEOPLASM OF LEFT BREAST, STAGE 4 (HCC): Primary | ICD-10-CM

## 2018-01-01 DIAGNOSIS — R51.9 NONINTRACTABLE EPISODIC HEADACHE, UNSPECIFIED HEADACHE TYPE: ICD-10-CM

## 2018-01-01 DIAGNOSIS — F41.9 ANXIETY: ICD-10-CM

## 2018-01-01 DIAGNOSIS — C79.31 BRAIN METASTASES (HCC): ICD-10-CM

## 2018-01-01 DIAGNOSIS — C79.51 BONE METASTASES (HCC): Primary | ICD-10-CM

## 2018-01-01 DIAGNOSIS — R93.89 ABNORMAL CHEST CT: ICD-10-CM

## 2018-01-01 DIAGNOSIS — R20.0 LEFT LEG NUMBNESS: Primary | ICD-10-CM

## 2018-01-01 DIAGNOSIS — C79.49 SECONDARY MALIGNANT NEOPLASM OF BRAIN AND SPINAL CORD (HCC): ICD-10-CM

## 2018-01-01 DIAGNOSIS — G89.29 CHRONIC LEFT-SIDED THORACIC BACK PAIN: ICD-10-CM

## 2018-01-01 DIAGNOSIS — Z51.89 ALTERNATIVE MEDICINE: ICD-10-CM

## 2018-01-01 DIAGNOSIS — C79.31 BRAIN METASTASIS (HCC): Primary | ICD-10-CM

## 2018-01-01 DIAGNOSIS — G93.89 MASS OF LEFT TEMPORAL LOBE: Primary | ICD-10-CM

## 2018-01-01 DIAGNOSIS — M54.32 SCIATICA OF LEFT SIDE: Primary | ICD-10-CM

## 2018-01-01 DIAGNOSIS — R06.02 SHORTNESS OF BREATH: Primary | ICD-10-CM

## 2018-01-01 DIAGNOSIS — R00.0 TACHYCARDIA: ICD-10-CM

## 2018-01-01 LAB
ACID FAST STN SPEC: NEGATIVE
ALBUMIN SERPL-MCNC: 3.1 G/DL (ref 3.5–5)
ALBUMIN SERPL-MCNC: 3.2 G/DL (ref 3.5–5)
ALBUMIN SERPL-MCNC: 3.3 G/DL (ref 3.5–5)
ALBUMIN SERPL-MCNC: 3.6 G/DL (ref 3.5–5)
ALBUMIN SERPL-MCNC: 4.1 G/DL (ref 3.5–5.5)
ALBUMIN SERPL-MCNC: 4.4 G/DL (ref 3.5–5.5)
ALBUMIN SERPL-MCNC: 4.4 G/DL (ref 3.5–5.5)
ALBUMIN SERPL-MCNC: 4.7 G/DL (ref 3.5–5.5)
ALBUMIN SERPL-MCNC: NORMAL G/DL
ALBUMIN/GLOB SERPL: 0.7 {RATIO} (ref 1.1–2.2)
ALBUMIN/GLOB SERPL: 0.9 {RATIO} (ref 1.1–2.2)
ALBUMIN/GLOB SERPL: 0.9 {RATIO} (ref 1.1–2.2)
ALBUMIN/GLOB SERPL: 1 {RATIO} (ref 1.1–2.2)
ALBUMIN/GLOB SERPL: 1.4 {RATIO} (ref 1.2–2.2)
ALBUMIN/GLOB SERPL: 1.5 {RATIO} (ref 1.2–2.2)
ALBUMIN/GLOB SERPL: 1.8 {RATIO} (ref 1.2–2.2)
ALBUMIN/GLOB SERPL: 1.8 {RATIO} (ref 1.2–2.2)
ALP SERPL-CCNC: 130 U/L (ref 45–117)
ALP SERPL-CCNC: 147 U/L (ref 45–117)
ALP SERPL-CCNC: 155 IU/L (ref 39–117)
ALP SERPL-CCNC: 163 U/L (ref 45–117)
ALP SERPL-CCNC: 209 U/L (ref 45–117)
ALP SERPL-CCNC: 219 IU/L (ref 39–117)
ALP SERPL-CCNC: 251 IU/L (ref 39–117)
ALP SERPL-CCNC: 289 IU/L (ref 39–117)
ALP SERPL-CCNC: NORMAL U/L
ALT SERPL-CCNC: 127 U/L (ref 12–78)
ALT SERPL-CCNC: 43 U/L (ref 12–78)
ALT SERPL-CCNC: 51 IU/L (ref 0–32)
ALT SERPL-CCNC: 55 IU/L (ref 0–32)
ALT SERPL-CCNC: 59 U/L (ref 12–78)
ALT SERPL-CCNC: 62 IU/L (ref 0–32)
ALT SERPL-CCNC: 65 U/L (ref 12–78)
ALT SERPL-CCNC: 67 IU/L (ref 0–32)
ALT SERPL-CCNC: NORMAL U/L
ANION GAP SERPL CALC-SCNC: 10 MMOL/L (ref 5–15)
ANION GAP SERPL CALC-SCNC: 11 MMOL/L (ref 5–15)
ANION GAP SERPL CALC-SCNC: 5 MMOL/L (ref 5–15)
ANION GAP SERPL CALC-SCNC: 6 MMOL/L (ref 5–15)
ANION GAP SERPL CALC-SCNC: 8 MMOL/L (ref 5–15)
ANION GAP SERPL CALC-SCNC: 9 MMOL/L (ref 5–15)
ANION GAP SERPL CALC-SCNC: 9 MMOL/L (ref 5–15)
APPEARANCE FLD: ABNORMAL
APPEARANCE UR: CLEAR
ARTERIAL PATENCY WRIST A: ABNORMAL
AST SERPL-CCNC: 58 U/L (ref 15–37)
AST SERPL-CCNC: 64 IU/L (ref 0–40)
AST SERPL-CCNC: 69 U/L (ref 15–37)
AST SERPL-CCNC: 70 IU/L (ref 0–40)
AST SERPL-CCNC: 85 IU/L (ref 0–40)
AST SERPL-CCNC: 86 IU/L (ref 0–40)
AST SERPL-CCNC: 99 U/L (ref 15–37)
AST SERPL-CCNC: ABNORMAL U/L (ref 15–37)
AST SERPL-CCNC: NORMAL U/L
ATRIAL RATE: 122 BPM
B PERT DNA SPEC QL NAA+PROBE: NOT DETECTED
BACTERIA SPEC CULT: NORMAL
BACTERIA URNS QL MICRO: NEGATIVE /HPF
BASE DEFICIT BLDA-SCNC: 2.3 MMOL/L
BASOPHILS # BLD AUTO: 0 X10E3/UL (ref 0–0.2)
BASOPHILS # BLD AUTO: NORMAL 10*3/UL
BASOPHILS # BLD: 0 K/UL (ref 0–0.1)
BASOPHILS NFR BLD AUTO: 0 %
BASOPHILS NFR BLD AUTO: 1 %
BASOPHILS NFR BLD: 0 % (ref 0–1)
BASOPHILS NFR BLD: 1 % (ref 0–1)
BDY SITE: ABNORMAL
BILIRUB SERPL-MCNC: 0.2 MG/DL (ref 0–1.2)
BILIRUB SERPL-MCNC: 0.3 MG/DL (ref 0.2–1)
BILIRUB SERPL-MCNC: 0.3 MG/DL (ref 0–1.2)
BILIRUB SERPL-MCNC: 0.4 MG/DL (ref 0–1.2)
BILIRUB SERPL-MCNC: 0.6 MG/DL (ref 0.2–1)
BILIRUB SERPL-MCNC: <0.2 MG/DL (ref 0–1.2)
BILIRUB SERPL-MCNC: NORMAL MG/DL
BILIRUB UR QL: NEGATIVE
BNP SERPL-MCNC: 230 PG/ML (ref 0–125)
BUN SERPL-MCNC: 11 MG/DL (ref 6–20)
BUN SERPL-MCNC: 12 MG/DL (ref 6–24)
BUN SERPL-MCNC: 13 MG/DL (ref 6–24)
BUN SERPL-MCNC: 14 MG/DL (ref 6–20)
BUN SERPL-MCNC: 15 MG/DL (ref 6–20)
BUN SERPL-MCNC: 15 MG/DL (ref 6–20)
BUN SERPL-MCNC: 15 MG/DL (ref 6–24)
BUN SERPL-MCNC: 16 MG/DL (ref 6–20)
BUN SERPL-MCNC: 17 MG/DL (ref 6–20)
BUN SERPL-MCNC: 17 MG/DL (ref 6–24)
BUN SERPL-MCNC: 23 MG/DL (ref 6–20)
BUN SERPL-MCNC: NORMAL MG/DL
BUN/CREAT SERPL: 17 (ref 9–23)
BUN/CREAT SERPL: 19 (ref 12–20)
BUN/CREAT SERPL: 19 (ref 9–23)
BUN/CREAT SERPL: 20 (ref 12–20)
BUN/CREAT SERPL: 21 (ref 12–20)
BUN/CREAT SERPL: 23 (ref 9–23)
BUN/CREAT SERPL: 26 (ref 12–20)
BUN/CREAT SERPL: 27 (ref 12–20)
BUN/CREAT SERPL: 27 (ref 9–23)
BUN/CREAT SERPL: 29 (ref 12–20)
BUN/CREAT SERPL: 30 (ref 12–20)
C PNEUM DNA SPEC QL NAA+PROBE: NOT DETECTED
CALCIUM SERPL-MCNC: 8.8 MG/DL (ref 8.5–10.1)
CALCIUM SERPL-MCNC: 8.9 MG/DL (ref 8.5–10.1)
CALCIUM SERPL-MCNC: 9.1 MG/DL (ref 8.5–10.1)
CALCIUM SERPL-MCNC: 9.1 MG/DL (ref 8.5–10.1)
CALCIUM SERPL-MCNC: 9.3 MG/DL (ref 8.5–10.1)
CALCIUM SERPL-MCNC: 9.4 MG/DL (ref 8.5–10.1)
CALCIUM SERPL-MCNC: 9.4 MG/DL (ref 8.7–10.2)
CALCIUM SERPL-MCNC: 9.5 MG/DL (ref 8.7–10.2)
CALCIUM SERPL-MCNC: 9.6 MG/DL (ref 8.5–10.1)
CALCIUM SERPL-MCNC: NORMAL MG/DL
CALCULATED P AXIS, ECG09: 63 DEGREES
CALCULATED R AXIS, ECG10: 41 DEGREES
CALCULATED T AXIS, ECG11: 66 DEGREES
CANCER AG27-29 SERPL-ACNC: 258.2 U/ML (ref 0–38.6)
CANCER AG27-29 SERPL-ACNC: 401 U/ML (ref 0–38.6)
CANCER AG27-29 SERPL-ACNC: 743.1 U/ML (ref 0–38.6)
CHLORIDE SERPL-SCNC: 100 MMOL/L (ref 96–106)
CHLORIDE SERPL-SCNC: 101 MMOL/L (ref 96–106)
CHLORIDE SERPL-SCNC: 103 MMOL/L (ref 97–108)
CHLORIDE SERPL-SCNC: 104 MMOL/L (ref 97–108)
CHLORIDE SERPL-SCNC: 104 MMOL/L (ref 97–108)
CHLORIDE SERPL-SCNC: 105 MMOL/L (ref 97–108)
CHLORIDE SERPL-SCNC: 107 MMOL/L (ref 97–108)
CHLORIDE SERPL-SCNC: 110 MMOL/L (ref 97–108)
CHLORIDE SERPL-SCNC: 96 MMOL/L (ref 96–106)
CHLORIDE SERPL-SCNC: 97 MMOL/L (ref 97–108)
CHLORIDE SERPL-SCNC: 99 MMOL/L (ref 96–106)
CHLORIDE SERPL-SCNC: NORMAL MMOL/L
CO2 SERPL-SCNC: 20 MMOL/L (ref 20–29)
CO2 SERPL-SCNC: 22 MMOL/L (ref 18–29)
CO2 SERPL-SCNC: 23 MMOL/L (ref 21–32)
CO2 SERPL-SCNC: 24 MMOL/L (ref 21–32)
CO2 SERPL-SCNC: 25 MMOL/L (ref 21–32)
CO2 SERPL-SCNC: 25 MMOL/L (ref 21–32)
CO2 SERPL-SCNC: 26 MMOL/L (ref 18–29)
CO2 SERPL-SCNC: 26 MMOL/L (ref 18–29)
CO2 SERPL-SCNC: 26 MMOL/L (ref 21–32)
CO2 SERPL-SCNC: 28 MMOL/L (ref 21–32)
CO2 SERPL-SCNC: 29 MMOL/L (ref 21–32)
CO2 SERPL-SCNC: NORMAL MMOL/L
COLOR FLD: YELLOW
COLOR UR: NORMAL
COMMENT, HOLDF: NORMAL
CREAT SERPL-MCNC: 0.55 MG/DL (ref 0.55–1.02)
CREAT SERPL-MCNC: 0.59 MG/DL (ref 0.55–1.02)
CREAT SERPL-MCNC: 0.59 MG/DL (ref 0.55–1.02)
CREAT SERPL-MCNC: 0.61 MG/DL (ref 0.55–1.02)
CREAT SERPL-MCNC: 0.64 MG/DL (ref 0.57–1)
CREAT SERPL-MCNC: 0.66 MG/DL (ref 0.57–1)
CREAT SERPL-MCNC: 0.67 MG/DL (ref 0.57–1)
CREAT SERPL-MCNC: 0.7 MG/DL (ref 0.55–1.02)
CREAT SERPL-MCNC: 0.7 MG/DL (ref 0.57–1)
CREAT SERPL-MCNC: 0.73 MG/DL (ref 0.55–1.02)
CREAT SERPL-MCNC: 0.76 MG/DL (ref 0.55–1.02)
CREAT SERPL-MCNC: NORMAL MG/DL
D DIMER PPP FEU-MCNC: 2.04 MG/L FEU (ref 0–0.65)
DIAGNOSIS, 93000: NORMAL
DIFFERENTIAL METHOD BLD: ABNORMAL
DIFFERENTIAL METHOD BLD: NORMAL
EOSINOPHIL # BLD AUTO: 0 X10E3/UL (ref 0–0.4)
EOSINOPHIL # BLD AUTO: 0.1 X10E3/UL (ref 0–0.4)
EOSINOPHIL # BLD AUTO: NORMAL 10*3/UL
EOSINOPHIL # BLD: 0 K/UL (ref 0–0.4)
EOSINOPHIL # BLD: 0.1 K/UL (ref 0–0.4)
EOSINOPHIL # BLD: 0.1 K/UL (ref 0–0.4)
EOSINOPHIL NFR BLD AUTO: 1 %
EOSINOPHIL NFR BLD AUTO: NORMAL %
EOSINOPHIL NFR BLD: 0 % (ref 0–7)
EOSINOPHIL NFR BLD: 1 % (ref 0–7)
EOSINOPHIL NFR BLD: 1 % (ref 0–7)
EPITH CASTS URNS QL MICRO: NORMAL /LPF
ERYTHROCYTE [DISTWIDTH] IN BLOOD BY AUTOMATED COUNT: 12.3 % (ref 11.5–14.5)
ERYTHROCYTE [DISTWIDTH] IN BLOOD BY AUTOMATED COUNT: 12.9 % (ref 11.5–14.5)
ERYTHROCYTE [DISTWIDTH] IN BLOOD BY AUTOMATED COUNT: 13 % (ref 11.5–14.5)
ERYTHROCYTE [DISTWIDTH] IN BLOOD BY AUTOMATED COUNT: 13.2 % (ref 11.5–14.5)
ERYTHROCYTE [DISTWIDTH] IN BLOOD BY AUTOMATED COUNT: 13.9 % (ref 12.3–15.4)
ERYTHROCYTE [DISTWIDTH] IN BLOOD BY AUTOMATED COUNT: 14.2 % (ref 11.5–14.5)
ERYTHROCYTE [DISTWIDTH] IN BLOOD BY AUTOMATED COUNT: 14.4 % (ref 11.5–14.5)
ERYTHROCYTE [DISTWIDTH] IN BLOOD BY AUTOMATED COUNT: 14.6 % (ref 12.3–15.4)
ERYTHROCYTE [DISTWIDTH] IN BLOOD BY AUTOMATED COUNT: 15.1 % (ref 12.3–15.4)
ERYTHROCYTE [DISTWIDTH] IN BLOOD BY AUTOMATED COUNT: 15.6 % (ref 11.5–14.5)
ERYTHROCYTE [DISTWIDTH] IN BLOOD BY AUTOMATED COUNT: 17.7 % (ref 12.3–15.4)
FLUAV H1 2009 PAND RNA SPEC QL NAA+PROBE: NOT DETECTED
FLUAV H1 RNA SPEC QL NAA+PROBE: NOT DETECTED
FLUAV H3 RNA SPEC QL NAA+PROBE: NOT DETECTED
FLUAV SUBTYP SPEC NAA+PROBE: NOT DETECTED
FLUBV RNA SPEC QL NAA+PROBE: NOT DETECTED
GAS FLOW.O2 O2 DELIVERY SYS: 2 L/MIN
GFR SERPLBLD CREATININE-BSD FMLA CKD-EPI: 111 ML/MIN/1.73
GFR SERPLBLD CREATININE-BSD FMLA CKD-EPI: 113 ML/MIN/1.73
GFR SERPLBLD CREATININE-BSD FMLA CKD-EPI: 115 ML/MIN/1.73
GFR SERPLBLD CREATININE-BSD FMLA CKD-EPI: 96 ML/MIN/1.73
GFR SERPLBLD CREATININE-BSD FMLA CKD-EPI: 98 ML/MIN/1.73
GFR SERPLBLD CREATININE-BSD FMLA CKD-EPI: 99 ML/MIN/1.73
GLOBULIN SER CALC-MCNC: 2.5 G/DL (ref 1.5–4.5)
GLOBULIN SER CALC-MCNC: 2.6 G/DL (ref 1.5–4.5)
GLOBULIN SER CALC-MCNC: 2.9 G/DL (ref 1.5–4.5)
GLOBULIN SER CALC-MCNC: 3 G/DL (ref 1.5–4.5)
GLOBULIN SER CALC-MCNC: 3.3 G/DL (ref 2–4)
GLOBULIN SER CALC-MCNC: 3.6 G/DL (ref 2–4)
GLOBULIN SER CALC-MCNC: 3.8 G/DL (ref 2–4)
GLOBULIN SER CALC-MCNC: 4.3 G/DL (ref 2–4)
GLUCOSE SERPL-MCNC: 101 MG/DL (ref 65–100)
GLUCOSE SERPL-MCNC: 105 MG/DL (ref 65–99)
GLUCOSE SERPL-MCNC: 107 MG/DL (ref 65–100)
GLUCOSE SERPL-MCNC: 111 MG/DL (ref 65–100)
GLUCOSE SERPL-MCNC: 72 MG/DL (ref 65–99)
GLUCOSE SERPL-MCNC: 77 MG/DL (ref 65–99)
GLUCOSE SERPL-MCNC: 86 MG/DL (ref 65–100)
GLUCOSE SERPL-MCNC: 87 MG/DL (ref 65–100)
GLUCOSE SERPL-MCNC: 90 MG/DL (ref 65–99)
GLUCOSE SERPL-MCNC: 97 MG/DL (ref 65–100)
GLUCOSE SERPL-MCNC: 99 MG/DL (ref 65–100)
GLUCOSE SERPL-MCNC: NORMAL MG/DL
GLUCOSE UR STRIP.AUTO-MCNC: NEGATIVE MG/DL
GRAM STN SPEC: NORMAL
HADV DNA SPEC QL NAA+PROBE: NOT DETECTED
HCO3 BLDA-SCNC: 23 MMOL/L (ref 22–26)
HCOV 229E RNA SPEC QL NAA+PROBE: NOT DETECTED
HCOV HKU1 RNA SPEC QL NAA+PROBE: NOT DETECTED
HCOV NL63 RNA SPEC QL NAA+PROBE: NOT DETECTED
HCOV OC43 RNA SPEC QL NAA+PROBE: NOT DETECTED
HCT VFR BLD AUTO: 33.3 % (ref 35–47)
HCT VFR BLD AUTO: 35.8 % (ref 34–46.6)
HCT VFR BLD AUTO: 36.2 % (ref 35–47)
HCT VFR BLD AUTO: 36.5 % (ref 34–46.6)
HCT VFR BLD AUTO: 36.8 % (ref 35–47)
HCT VFR BLD AUTO: 37.8 % (ref 35–47)
HCT VFR BLD AUTO: 37.9 % (ref 35–47)
HCT VFR BLD AUTO: 38.4 % (ref 34–46.6)
HCT VFR BLD AUTO: 39.5 % (ref 35–47)
HCT VFR BLD AUTO: 42.5 % (ref 34–46.6)
HCT VFR BLD AUTO: 44.4 % (ref 35–47)
HCT VFR BLD AUTO: NORMAL %
HGB BLD-MCNC: 10.2 G/DL (ref 11.5–16)
HGB BLD-MCNC: 11.2 G/DL (ref 11.5–16)
HGB BLD-MCNC: 11.4 G/DL (ref 11.5–16)
HGB BLD-MCNC: 11.6 G/DL (ref 11.5–16)
HGB BLD-MCNC: 11.9 G/DL (ref 11.1–15.9)
HGB BLD-MCNC: 12 G/DL (ref 11.5–16)
HGB BLD-MCNC: 12.3 G/DL (ref 11.1–15.9)
HGB BLD-MCNC: 12.5 G/DL (ref 11.5–16)
HGB BLD-MCNC: 12.5 G/DL (ref 11.5–16)
HGB BLD-MCNC: 12.7 G/DL (ref 11.5–16)
HGB BLD-MCNC: 12.9 G/DL (ref 11.1–15.9)
HGB BLD-MCNC: 12.9 G/DL (ref 11.5–16)
HGB BLD-MCNC: 13.5 G/DL (ref 11.5–16)
HGB BLD-MCNC: 14 G/DL (ref 11.1–15.9)
HGB BLD-MCNC: 15.4 G/DL (ref 11.5–16)
HGB BLD-MCNC: NORMAL G/DL
HGB UR QL STRIP: NEGATIVE
HMPV RNA SPEC QL NAA+PROBE: NOT DETECTED
HPIV1 RNA SPEC QL NAA+PROBE: NOT DETECTED
HPIV2 RNA SPEC QL NAA+PROBE: NOT DETECTED
HPIV3 RNA SPEC QL NAA+PROBE: NOT DETECTED
HPIV4 RNA SPEC QL NAA+PROBE: NOT DETECTED
HYALINE CASTS URNS QL MICRO: NORMAL /LPF (ref 0–5)
IMM GRANULOCYTES # BLD: 0 K/UL (ref 0–0.04)
IMM GRANULOCYTES # BLD: 0 X10E3/UL (ref 0–0.1)
IMM GRANULOCYTES # BLD: 0.1 K/UL (ref 0–0.04)
IMM GRANULOCYTES NFR BLD AUTO: 0 % (ref 0–0.5)
IMM GRANULOCYTES NFR BLD AUTO: 1 % (ref 0–0.5)
IMM GRANULOCYTES NFR BLD AUTO: 1 % (ref 0–0.5)
IMM GRANULOCYTES NFR BLD: 0 %
IMM GRANULOCYTES NFR BLD: 1 %
KETONES UR QL STRIP.AUTO: NEGATIVE MG/DL
LACTATE BLD-SCNC: 1.7 MMOL/L (ref 0.4–2)
LEGIONELLA SPEC CULT: NORMAL
LEGIONELLA SPEC CULT: NORMAL
LEUKOCYTE ESTERASE UR QL STRIP.AUTO: NEGATIVE
LYMPHOCYTES # BLD AUTO: 1.1 X10E3/UL (ref 0.7–3.1)
LYMPHOCYTES # BLD AUTO: 1.3 X10E3/UL (ref 0.7–3.1)
LYMPHOCYTES # BLD AUTO: 1.4 X10E3/UL (ref 0.7–3.1)
LYMPHOCYTES # BLD AUTO: 1.4 X10E3/UL (ref 0.7–3.1)
LYMPHOCYTES # BLD AUTO: NORMAL 10*3/UL
LYMPHOCYTES # BLD: 0.3 K/UL (ref 0.8–3.5)
LYMPHOCYTES # BLD: 0.4 K/UL (ref 0.8–3.5)
LYMPHOCYTES # BLD: 0.7 K/UL (ref 0.8–3.5)
LYMPHOCYTES # BLD: 1.1 K/UL (ref 0.8–3.5)
LYMPHOCYTES # BLD: 1.2 K/UL (ref 0.8–3.5)
LYMPHOCYTES # BLD: 1.3 K/UL (ref 0.8–3.5)
LYMPHOCYTES # BLD: 1.4 K/UL (ref 0.8–3.5)
LYMPHOCYTES NFR BLD AUTO: 14 %
LYMPHOCYTES NFR BLD AUTO: 22 %
LYMPHOCYTES NFR BLD AUTO: 23 %
LYMPHOCYTES NFR BLD AUTO: 26 %
LYMPHOCYTES NFR BLD AUTO: NORMAL %
LYMPHOCYTES NFR BLD: 15 % (ref 12–49)
LYMPHOCYTES NFR BLD: 19 % (ref 12–49)
LYMPHOCYTES NFR BLD: 26 % (ref 12–49)
LYMPHOCYTES NFR BLD: 3 % (ref 12–49)
LYMPHOCYTES NFR BLD: 4 % (ref 12–49)
LYMPHOCYTES NFR BLD: 8 % (ref 12–49)
LYMPHOCYTES NFR BLD: 9 % (ref 12–49)
LYMPHOCYTES NFR FLD: 67 %
M PNEUMO DNA SPEC QL NAA+PROBE: NOT DETECTED
M PNEUMO IGG SER IA-ACNC: <100 U/ML (ref 0–99)
M PNEUMO IGM SER IA-ACNC: <770 U/ML (ref 0–769)
MAGNESIUM SERPL-MCNC: 2.2 MG/DL (ref 1.6–2.4)
MAGNESIUM SERPL-MCNC: 2.5 MG/DL (ref 1.6–2.4)
MCH RBC QN AUTO: 31.4 PG (ref 26.6–33)
MCH RBC QN AUTO: 31.5 PG (ref 26.6–33)
MCH RBC QN AUTO: 32 PG (ref 26–34)
MCH RBC QN AUTO: 32 PG (ref 26–34)
MCH RBC QN AUTO: 32.2 PG (ref 26–34)
MCH RBC QN AUTO: 32.3 PG (ref 26.6–33)
MCH RBC QN AUTO: 32.3 PG (ref 26–34)
MCH RBC QN AUTO: 32.3 PG (ref 26–34)
MCH RBC QN AUTO: 32.4 PG (ref 26–34)
MCH RBC QN AUTO: 32.6 PG (ref 26–34)
MCH RBC QN AUTO: 32.8 PG (ref 26.6–33)
MCHC RBC AUTO-ENTMCNC: 32.9 G/DL (ref 31.5–35.7)
MCHC RBC AUTO-ENTMCNC: 33.2 G/DL (ref 31.5–35.7)
MCHC RBC AUTO-ENTMCNC: 33.5 G/DL (ref 30–36.5)
MCHC RBC AUTO-ENTMCNC: 33.6 G/DL (ref 31.5–35.7)
MCHC RBC AUTO-ENTMCNC: 33.7 G/DL (ref 31.5–35.7)
MCHC RBC AUTO-ENTMCNC: 34 G/DL (ref 30–36.5)
MCHC RBC AUTO-ENTMCNC: 34.1 G/DL (ref 30–36.5)
MCHC RBC AUTO-ENTMCNC: 34.2 G/DL (ref 30–36.5)
MCHC RBC AUTO-ENTMCNC: 34.5 G/DL (ref 30–36.5)
MCHC RBC AUTO-ENTMCNC: 34.7 G/DL (ref 30–36.5)
MCHC RBC AUTO-ENTMCNC: 34.8 G/DL (ref 30–36.5)
MCV RBC AUTO: 100 FL (ref 79–97)
MCV RBC AUTO: 93 FL (ref 79–97)
MCV RBC AUTO: 93.1 FL (ref 80–99)
MCV RBC AUTO: 93.5 FL (ref 80–99)
MCV RBC AUTO: 93.6 FL (ref 80–99)
MCV RBC AUTO: 93.8 FL (ref 80–99)
MCV RBC AUTO: 94.1 FL (ref 80–99)
MCV RBC AUTO: 94.5 FL (ref 80–99)
MCV RBC AUTO: 95 FL (ref 79–97)
MCV RBC AUTO: 96 FL (ref 79–97)
MCV RBC AUTO: 96.2 FL (ref 80–99)
MESOTHL CELL NFR FLD: 15 %
MONOCYTES # BLD AUTO: 0.6 X10E3/UL (ref 0.1–0.9)
MONOCYTES # BLD AUTO: 0.7 X10E3/UL (ref 0.1–0.9)
MONOCYTES # BLD AUTO: 0.7 X10E3/UL (ref 0.1–0.9)
MONOCYTES # BLD AUTO: 0.8 X10E3/UL (ref 0.1–0.9)
MONOCYTES # BLD: 0.4 K/UL (ref 0–1)
MONOCYTES # BLD: 0.4 K/UL (ref 0–1)
MONOCYTES # BLD: 0.6 K/UL (ref 0–1)
MONOCYTES # BLD: 1.1 K/UL (ref 0–1)
MONOCYTES # BLD: 1.2 K/UL (ref 0–1)
MONOCYTES NFR BLD AUTO: 10 %
MONOCYTES NFR BLD AUTO: 12 %
MONOCYTES NFR BLD AUTO: 13 %
MONOCYTES NFR BLD AUTO: 9 %
MONOCYTES NFR BLD AUTO: NORMAL %
MONOCYTES NFR BLD: 10 % (ref 5–13)
MONOCYTES NFR BLD: 12 % (ref 5–13)
MONOCYTES NFR BLD: 4 % (ref 5–13)
MONOCYTES NFR BLD: 4 % (ref 5–13)
MONOCYTES NFR BLD: 8 % (ref 5–13)
MONOCYTES NFR BLD: 8 % (ref 5–13)
MONOCYTES NFR BLD: 9 % (ref 5–13)
MONOS+MACROS NFR FLD: 18 %
MYCOBACTERIUM SPEC QL CULT: NEGATIVE
NEUTROPHILS # BLD AUTO: 3.2 X10E3/UL (ref 1.4–7)
NEUTROPHILS # BLD AUTO: 3.6 X10E3/UL (ref 1.4–7)
NEUTROPHILS # BLD AUTO: 4.3 X10E3/UL (ref 1.4–7)
NEUTROPHILS # BLD AUTO: 5.6 X10E3/UL (ref 1.4–7)
NEUTROPHILS NFR BLD AUTO: 61 %
NEUTROPHILS NFR BLD AUTO: 62 %
NEUTROPHILS NFR BLD AUTO: 67 %
NEUTROPHILS NFR BLD AUTO: 75 %
NEUTROPHILS NFR BLD AUTO: NORMAL %
NEUTS SEG # BLD: 11.6 K/UL (ref 1.8–8)
NEUTS SEG # BLD: 12 K/UL (ref 1.8–8)
NEUTS SEG # BLD: 3.2 K/UL (ref 1.8–8)
NEUTS SEG # BLD: 4.3 K/UL (ref 1.8–8)
NEUTS SEG # BLD: 5.6 K/UL (ref 1.8–8)
NEUTS SEG # BLD: 8.2 K/UL (ref 1.8–8)
NEUTS SEG # BLD: 8.8 K/UL (ref 1.8–8)
NEUTS SEG NFR BLD: 60 % (ref 32–75)
NEUTS SEG NFR BLD: 69 % (ref 32–75)
NEUTS SEG NFR BLD: 76 % (ref 32–75)
NEUTS SEG NFR BLD: 83 % (ref 32–75)
NEUTS SEG NFR BLD: 87 % (ref 32–75)
NEUTS SEG NFR BLD: 88 % (ref 32–75)
NEUTS SEG NFR BLD: 92 % (ref 32–75)
NITRITE UR QL STRIP.AUTO: NEGATIVE
NRBC # BLD: 0 K/UL (ref 0–0.01)
NRBC BLD-RTO: 0 PER 100 WBC
NUC CELL # FLD: 296 /CU MM
P-R INTERVAL, ECG05: 118 MS
PCO2 BLDA: 42 MMHG (ref 35–45)
PH BLDA: 7.36 [PH] (ref 7.35–7.45)
PH UR STRIP: 6 [PH] (ref 5–8)
PHOSPHATE SERPL-MCNC: 3.8 MG/DL (ref 2.6–4.7)
PHOSPHATE SERPL-MCNC: 4.2 MG/DL (ref 2.6–4.7)
PLATELET # BLD AUTO: 274 K/UL (ref 150–400)
PLATELET # BLD AUTO: 288 K/UL (ref 150–400)
PLATELET # BLD AUTO: 295 K/UL (ref 150–400)
PLATELET # BLD AUTO: 295 X10E3/UL (ref 150–379)
PLATELET # BLD AUTO: 330 K/UL (ref 150–400)
PLATELET # BLD AUTO: 360 X10E3/UL (ref 150–379)
PLATELET # BLD AUTO: 394 K/UL (ref 150–400)
PLATELET # BLD AUTO: 406 K/UL (ref 150–400)
PLATELET # BLD AUTO: 416 X10E3/UL (ref 150–379)
PLATELET # BLD AUTO: 420 K/UL (ref 150–400)
PLATELET # BLD AUTO: 506 X10E3/UL (ref 150–379)
PLATELET # BLD AUTO: NORMAL 10*3/UL
PMV BLD AUTO: 10 FL (ref 8.9–12.9)
PMV BLD AUTO: 10 FL (ref 8.9–12.9)
PMV BLD AUTO: 8.8 FL (ref 8.9–12.9)
PMV BLD AUTO: 9.1 FL (ref 8.9–12.9)
PMV BLD AUTO: 9.2 FL (ref 8.9–12.9)
PMV BLD AUTO: 9.6 FL (ref 8.9–12.9)
PMV BLD AUTO: 9.8 FL (ref 8.9–12.9)
PO2 BLDA: 79 MMHG (ref 80–100)
POTASSIUM SERPL-SCNC: 3.9 MMOL/L (ref 3.5–5.1)
POTASSIUM SERPL-SCNC: 4.1 MMOL/L (ref 3.5–5.1)
POTASSIUM SERPL-SCNC: 4.2 MMOL/L (ref 3.5–5.1)
POTASSIUM SERPL-SCNC: 4.4 MMOL/L (ref 3.5–5.1)
POTASSIUM SERPL-SCNC: 4.5 MMOL/L (ref 3.5–5.1)
POTASSIUM SERPL-SCNC: 4.5 MMOL/L (ref 3.5–5.1)
POTASSIUM SERPL-SCNC: 4.6 MMOL/L (ref 3.5–5.1)
POTASSIUM SERPL-SCNC: 4.6 MMOL/L (ref 3.5–5.2)
POTASSIUM SERPL-SCNC: 4.6 MMOL/L (ref 3.5–5.2)
POTASSIUM SERPL-SCNC: 4.9 MMOL/L (ref 3.5–5.2)
POTASSIUM SERPL-SCNC: 4.9 MMOL/L (ref 3.5–5.2)
POTASSIUM SERPL-SCNC: ABNORMAL MMOL/L (ref 3.5–5.1)
POTASSIUM SERPL-SCNC: NORMAL MMOL/L
PROT SERPL-MCNC: 6.5 G/DL (ref 6.4–8.2)
PROT SERPL-MCNC: 6.9 G/DL (ref 6.4–8.2)
PROT SERPL-MCNC: 6.9 G/DL (ref 6–8.5)
PROT SERPL-MCNC: 7.1 G/DL (ref 6–8.5)
PROT SERPL-MCNC: 7.3 G/DL (ref 6–8.5)
PROT SERPL-MCNC: 7.3 G/DL (ref 6–8.5)
PROT SERPL-MCNC: 7.4 G/DL (ref 6.4–8.2)
PROT SERPL-MCNC: 7.4 G/DL (ref 6.4–8.2)
PROT SERPL-MCNC: NORMAL G/DL
PROT UR STRIP-MCNC: NEGATIVE MG/DL
Q-T INTERVAL, ECG07: 318 MS
QRS DURATION, ECG06: 76 MS
QTC CALCULATION (BEZET), ECG08: 453 MS
RBC # BLD AUTO: 3.56 M/UL (ref 3.8–5.2)
RBC # BLD AUTO: 3.79 X10E6/UL (ref 3.77–5.28)
RBC # BLD AUTO: 3.86 M/UL (ref 3.8–5.2)
RBC # BLD AUTO: 3.91 M/UL (ref 3.8–5.2)
RBC # BLD AUTO: 3.91 X10E6/UL (ref 3.77–5.28)
RBC # BLD AUTO: 3.94 M/UL (ref 3.8–5.2)
RBC # BLD AUTO: 4 M/UL (ref 3.8–5.2)
RBC # BLD AUTO: 4 X10E6/UL (ref 3.77–5.28)
RBC # BLD AUTO: 4.22 M/UL (ref 3.8–5.2)
RBC # BLD AUTO: 4.27 X10E6/UL (ref 3.77–5.28)
RBC # BLD AUTO: 4.77 M/UL (ref 3.8–5.2)
RBC # BLD AUTO: NORMAL 10*6/UL
RBC # FLD: 41 /CU MM
RBC #/AREA URNS HPF: NORMAL /HPF (ref 0–5)
RBC MORPH BLD: ABNORMAL
RBC MORPH BLD: ABNORMAL
RSV RNA SPEC QL NAA+PROBE: NOT DETECTED
RV+EV RNA SPEC QL NAA+PROBE: NOT DETECTED
SAMPLES BEING HELD,HOLD: NORMAL
SAO2 % BLD: 95 % (ref 92–97)
SAO2% DEVICE SAO2% SENSOR NAME: ABNORMAL
SERVICE CMNT-IMP: NORMAL
SODIUM SERPL-SCNC: 132 MMOL/L (ref 136–145)
SODIUM SERPL-SCNC: 137 MMOL/L (ref 136–145)
SODIUM SERPL-SCNC: 138 MMOL/L (ref 134–144)
SODIUM SERPL-SCNC: 138 MMOL/L (ref 134–144)
SODIUM SERPL-SCNC: 138 MMOL/L (ref 136–145)
SODIUM SERPL-SCNC: 138 MMOL/L (ref 136–145)
SODIUM SERPL-SCNC: 139 MMOL/L (ref 136–145)
SODIUM SERPL-SCNC: 140 MMOL/L (ref 134–144)
SODIUM SERPL-SCNC: 141 MMOL/L (ref 134–144)
SODIUM SERPL-SCNC: 142 MMOL/L (ref 136–145)
SODIUM SERPL-SCNC: 142 MMOL/L (ref 136–145)
SODIUM SERPL-SCNC: NORMAL MMOL/L
SP GR UR REFRACTOMETRY: 1.01 (ref 1–1.03)
SPECIMEN PREPARATION: NORMAL
SPECIMEN SITE: ABNORMAL
SPECIMEN SOURCE FLD: ABNORMAL
SPECIMEN SOURCE: NORMAL
SPECIMEN STATUS REPORT, ROLRST: NORMAL
TROPONIN I SERPL-MCNC: <0.05 NG/ML
TSH SERPL DL<=0.05 MIU/L-ACNC: 4.78 UIU/ML (ref 0.36–3.74)
UA: UC IF INDICATED,UAUC: NORMAL
UROBILINOGEN UR QL STRIP.AUTO: 0.2 EU/DL (ref 0.2–1)
VENTRICULAR RATE, ECG03: 122 BPM
VIRUS SPEC CULT: NORMAL
WBC # BLD AUTO: 13.7 K/UL (ref 3.6–11)
WBC # BLD AUTO: 14 K/UL (ref 3.6–11)
WBC # BLD AUTO: 5.3 K/UL (ref 3.6–11)
WBC # BLD AUTO: 5.3 X10E3/UL (ref 3.4–10.8)
WBC # BLD AUTO: 5.7 X10E3/UL (ref 3.4–10.8)
WBC # BLD AUTO: 6.2 K/UL (ref 3.6–11)
WBC # BLD AUTO: 6.4 X10E3/UL (ref 3.4–10.8)
WBC # BLD AUTO: 7.4 K/UL (ref 3.6–11)
WBC # BLD AUTO: 7.6 X10E3/UL (ref 3.4–10.8)
WBC # BLD AUTO: 9.3 K/UL (ref 3.6–11)
WBC # BLD AUTO: 9.6 K/UL (ref 3.6–11)
WBC # BLD AUTO: NORMAL X10E3/UL
WBC URNS QL MICRO: NORMAL /HPF (ref 0–4)

## 2018-01-01 PROCEDURE — 74011000258 HC RX REV CODE- 258: Performed by: NEUROLOGICAL SURGERY

## 2018-01-01 PROCEDURE — 76450000000

## 2018-01-01 PROCEDURE — 77030011640 HC PAD GRND REM COVD -A: Performed by: NEUROLOGICAL SURGERY

## 2018-01-01 PROCEDURE — 84484 ASSAY OF TROPONIN QUANT: CPT | Performed by: EMERGENCY MEDICINE

## 2018-01-01 PROCEDURE — 89050 BODY FLUID CELL COUNT: CPT | Performed by: INTERNAL MEDICINE

## 2018-01-01 PROCEDURE — 77030032490 HC SLV COMPR SCD KNE COVD -B

## 2018-01-01 PROCEDURE — 74177 CT ABD & PELVIS W/CONTRAST: CPT

## 2018-01-01 PROCEDURE — 74011250637 HC RX REV CODE- 250/637: Performed by: INTERNAL MEDICINE

## 2018-01-01 PROCEDURE — 77030018836 HC SOL IRR NACL ICUM -A: Performed by: NEUROLOGICAL SURGERY

## 2018-01-01 PROCEDURE — 97162 PT EVAL MOD COMPLEX 30 MIN: CPT

## 2018-01-01 PROCEDURE — 87116 MYCOBACTERIA CULTURE: CPT | Performed by: INTERNAL MEDICINE

## 2018-01-01 PROCEDURE — 74011000258 HC RX REV CODE- 258: Performed by: INTERNAL MEDICINE

## 2018-01-01 PROCEDURE — 85025 COMPLETE CBC W/AUTO DIFF WBC: CPT | Performed by: EMERGENCY MEDICINE

## 2018-01-01 PROCEDURE — 36415 COLL VENOUS BLD VENIPUNCTURE: CPT | Performed by: NURSE PRACTITIONER

## 2018-01-01 PROCEDURE — 36415 COLL VENOUS BLD VENIPUNCTURE: CPT | Performed by: EMERGENCY MEDICINE

## 2018-01-01 PROCEDURE — 74011000258 HC RX REV CODE- 258: Performed by: EMERGENCY MEDICINE

## 2018-01-01 PROCEDURE — 80053 COMPREHEN METABOLIC PANEL: CPT | Performed by: EMERGENCY MEDICINE

## 2018-01-01 PROCEDURE — 74011250636 HC RX REV CODE- 250/636: Performed by: NURSE PRACTITIONER

## 2018-01-01 PROCEDURE — 77030003029 HC SUT VCRL J&J -B: Performed by: NEUROLOGICAL SURGERY

## 2018-01-01 PROCEDURE — 70551 MRI BRAIN STEM W/O DYE: CPT

## 2018-01-01 PROCEDURE — 74011250636 HC RX REV CODE- 250/636: Performed by: ANESTHESIOLOGY

## 2018-01-01 PROCEDURE — 97112 NEUROMUSCULAR REEDUCATION: CPT

## 2018-01-01 PROCEDURE — 83735 ASSAY OF MAGNESIUM: CPT | Performed by: INTERNAL MEDICINE

## 2018-01-01 PROCEDURE — 94640 AIRWAY INHALATION TREATMENT: CPT

## 2018-01-01 PROCEDURE — 00B00ZZ EXCISION OF BRAIN, OPEN APPROACH: ICD-10-PCS | Performed by: NEUROLOGICAL SURGERY

## 2018-01-01 PROCEDURE — 65660000000 HC RM CCU STEPDOWN

## 2018-01-01 PROCEDURE — 74011000250 HC RX REV CODE- 250: Performed by: INTERNAL MEDICINE

## 2018-01-01 PROCEDURE — 77030013140 HC MSK NEB VYRM -A

## 2018-01-01 PROCEDURE — 88305 TISSUE EXAM BY PATHOLOGIST: CPT | Performed by: NEUROLOGICAL SURGERY

## 2018-01-01 PROCEDURE — 99284 EMERGENCY DEPT VISIT MOD MDM: CPT

## 2018-01-01 PROCEDURE — 77030013079 HC BLNKT BAIR HGGR 3M -A: Performed by: ANESTHESIOLOGY

## 2018-01-01 PROCEDURE — 85025 COMPLETE CBC W/AUTO DIFF WBC: CPT | Performed by: INTERNAL MEDICINE

## 2018-01-01 PROCEDURE — 87252 VIRUS INOCULATION TISSUE: CPT | Performed by: INTERNAL MEDICINE

## 2018-01-01 PROCEDURE — 77030012602 HC SPNG PTTY NEUR J&J -B: Performed by: NEUROLOGICAL SURGERY

## 2018-01-01 PROCEDURE — 86738 MYCOPLASMA ANTIBODY: CPT | Performed by: INTERNAL MEDICINE

## 2018-01-01 PROCEDURE — A9576 INJ PROHANCE MULTIPACK: HCPCS | Performed by: HOSPITALIST

## 2018-01-01 PROCEDURE — 70553 MRI BRAIN STEM W/O & W/DYE: CPT

## 2018-01-01 PROCEDURE — 88112 CYTOPATH CELL ENHANCE TECH: CPT | Performed by: INTERNAL MEDICINE

## 2018-01-01 PROCEDURE — 36415 COLL VENOUS BLD VENIPUNCTURE: CPT | Performed by: INTERNAL MEDICINE

## 2018-01-01 PROCEDURE — 87070 CULTURE OTHR SPECIMN AEROBIC: CPT | Performed by: INTERNAL MEDICINE

## 2018-01-01 PROCEDURE — 74011250636 HC RX REV CODE- 250/636: Performed by: INTERNAL MEDICINE

## 2018-01-01 PROCEDURE — 84100 ASSAY OF PHOSPHORUS: CPT | Performed by: INTERNAL MEDICINE

## 2018-01-01 PROCEDURE — 74011000250 HC RX REV CODE- 250: Performed by: NEUROLOGICAL SURGERY

## 2018-01-01 PROCEDURE — 77030026438 HC STYL ET INTUB CARD -A: Performed by: ANESTHESIOLOGY

## 2018-01-01 PROCEDURE — 97535 SELF CARE MNGMENT TRAINING: CPT

## 2018-01-01 PROCEDURE — 74011250637 HC RX REV CODE- 250/637: Performed by: EMERGENCY MEDICINE

## 2018-01-01 PROCEDURE — 77030002946 HC SUT NRLN J&J -B: Performed by: NEUROLOGICAL SURGERY

## 2018-01-01 PROCEDURE — 76010000172 HC OR TIME 2.5 TO 3 HR INTENSV-TIER 1: Performed by: NEUROLOGICAL SURGERY

## 2018-01-01 PROCEDURE — 74011250636 HC RX REV CODE- 250/636: Performed by: NEUROLOGICAL SURGERY

## 2018-01-01 PROCEDURE — 77030005401 HC CATH RAD ARRO -A: Performed by: ANESTHESIOLOGY

## 2018-01-01 PROCEDURE — 93005 ELECTROCARDIOGRAM TRACING: CPT

## 2018-01-01 PROCEDURE — 83880 ASSAY OF NATRIURETIC PEPTIDE: CPT | Performed by: EMERGENCY MEDICINE

## 2018-01-01 PROCEDURE — 72146 MRI CHEST SPINE W/O DYE: CPT

## 2018-01-01 PROCEDURE — 70450 CT HEAD/BRAIN W/O DYE: CPT

## 2018-01-01 PROCEDURE — 03HY32Z INSERTION OF MONITORING DEVICE INTO UPPER ARTERY, PERCUTANEOUS APPROACH: ICD-10-PCS | Performed by: ANESTHESIOLOGY

## 2018-01-01 PROCEDURE — 87633 RESP VIRUS 12-25 TARGETS: CPT | Performed by: INTERNAL MEDICINE

## 2018-01-01 PROCEDURE — A9575 INJ GADOTERATE MEGLUMI 0.1ML: HCPCS | Performed by: NEUROLOGICAL SURGERY

## 2018-01-01 PROCEDURE — 77030020263 HC SOL INJ SOD CL0.9% LFCR 1000ML: Performed by: NEUROLOGICAL SURGERY

## 2018-01-01 PROCEDURE — 74011250636 HC RX REV CODE- 250/636

## 2018-01-01 PROCEDURE — 77030014355 HC CVR BUR H TI BIOM -C: Performed by: NEUROLOGICAL SURGERY

## 2018-01-01 PROCEDURE — 77030004391 HC BUR FLUT MEDT -C: Performed by: NEUROLOGICAL SURGERY

## 2018-01-01 PROCEDURE — 74011000258 HC RX REV CODE- 258: Performed by: HOSPITALIST

## 2018-01-01 PROCEDURE — 99285 EMERGENCY DEPT VISIT HI MDM: CPT

## 2018-01-01 PROCEDURE — 93306 TTE W/DOPPLER COMPLETE: CPT

## 2018-01-01 PROCEDURE — 74011250637 HC RX REV CODE- 250/637: Performed by: HOSPITALIST

## 2018-01-01 PROCEDURE — 77030020061 HC IV BLD WRMR ADMIN SET 3M -B: Performed by: ANESTHESIOLOGY

## 2018-01-01 PROCEDURE — 77010033678 HC OXYGEN DAILY

## 2018-01-01 PROCEDURE — 36600 WITHDRAWAL OF ARTERIAL BLOOD: CPT | Performed by: INTERNAL MEDICINE

## 2018-01-01 PROCEDURE — 74011636320 HC RX REV CODE- 636/320: Performed by: HOSPITALIST

## 2018-01-01 PROCEDURE — 72148 MRI LUMBAR SPINE W/O DYE: CPT

## 2018-01-01 PROCEDURE — 77030032490 HC SLV COMPR SCD KNE COVD -B: Performed by: NEUROLOGICAL SURGERY

## 2018-01-01 PROCEDURE — 88342 IMHCHEM/IMCYTCHM 1ST ANTB: CPT | Performed by: NEUROLOGICAL SURGERY

## 2018-01-01 PROCEDURE — 71250 CT THORAX DX C-: CPT

## 2018-01-01 PROCEDURE — 70552 MRI BRAIN STEM W/DYE: CPT

## 2018-01-01 PROCEDURE — 65610000006 HC RM INTENSIVE CARE

## 2018-01-01 PROCEDURE — 87102 FUNGUS ISOLATION CULTURE: CPT | Performed by: INTERNAL MEDICINE

## 2018-01-01 PROCEDURE — 87040 BLOOD CULTURE FOR BACTERIA: CPT | Performed by: EMERGENCY MEDICINE

## 2018-01-01 PROCEDURE — 36415 COLL VENOUS BLD VENIPUNCTURE: CPT | Performed by: HOSPITALIST

## 2018-01-01 PROCEDURE — 96374 THER/PROPH/DIAG INJ IV PUSH: CPT

## 2018-01-01 PROCEDURE — 71275 CT ANGIOGRAPHY CHEST: CPT

## 2018-01-01 PROCEDURE — C1713 ANCHOR/SCREW BN/BN,TIS/BN: HCPCS | Performed by: NEUROLOGICAL SURGERY

## 2018-01-01 PROCEDURE — 74011636320 HC RX REV CODE- 636/320: Performed by: RADIOLOGY

## 2018-01-01 PROCEDURE — 76060000036 HC ANESTHESIA 2.5 TO 3 HR: Performed by: NEUROLOGICAL SURGERY

## 2018-01-01 PROCEDURE — 85379 FIBRIN DEGRADATION QUANT: CPT | Performed by: EMERGENCY MEDICINE

## 2018-01-01 PROCEDURE — G8988 SELF CARE GOAL STATUS: HCPCS

## 2018-01-01 PROCEDURE — 80053 COMPREHEN METABOLIC PANEL: CPT | Performed by: INTERNAL MEDICINE

## 2018-01-01 PROCEDURE — 77030022556 HC FCPS BIOP TIS ENDOSC BSC -B: Performed by: INTERNAL MEDICINE

## 2018-01-01 PROCEDURE — A9575 INJ GADOTERATE MEGLUMI 0.1ML: HCPCS | Performed by: INTERNAL MEDICINE

## 2018-01-01 PROCEDURE — 74230 X-RAY XM SWLNG FUNCJ C+: CPT

## 2018-01-01 PROCEDURE — 86300 IMMUNOASSAY TUMOR CA 15-3: CPT | Performed by: NURSE PRACTITIONER

## 2018-01-01 PROCEDURE — 74011250637 HC RX REV CODE- 250/637: Performed by: NEUROLOGICAL SURGERY

## 2018-01-01 PROCEDURE — 71260 CT THORAX DX C+: CPT

## 2018-01-01 PROCEDURE — 80048 BASIC METABOLIC PNL TOTAL CA: CPT | Performed by: INTERNAL MEDICINE

## 2018-01-01 PROCEDURE — 78582 LUNG VENTILAT&PERFUS IMAGING: CPT

## 2018-01-01 PROCEDURE — 74176 CT ABD & PELVIS W/O CONTRAST: CPT

## 2018-01-01 PROCEDURE — 77030014007 HC SPNG HEMSTAT J&J -B: Performed by: NEUROLOGICAL SURGERY

## 2018-01-01 PROCEDURE — 84443 ASSAY THYROID STIM HORMONE: CPT | Performed by: INTERNAL MEDICINE

## 2018-01-01 PROCEDURE — 77030004472 HC BUR TAPR MEDT -B: Performed by: NEUROLOGICAL SURGERY

## 2018-01-01 PROCEDURE — 92611 MOTION FLUOROSCOPY/SWALLOW: CPT

## 2018-01-01 PROCEDURE — 77030003892 HC BIT DRL TWST MEDT -B: Performed by: NEUROLOGICAL SURGERY

## 2018-01-01 PROCEDURE — C8929 TTE W OR WO FOL WCON,DOPPLER: HCPCS

## 2018-01-01 PROCEDURE — 80048 BASIC METABOLIC PNL TOTAL CA: CPT | Performed by: HOSPITALIST

## 2018-01-01 PROCEDURE — 80053 COMPREHEN METABOLIC PANEL: CPT | Performed by: NURSE PRACTITIONER

## 2018-01-01 PROCEDURE — 82803 BLOOD GASES ANY COMBINATION: CPT | Performed by: INTERNAL MEDICINE

## 2018-01-01 PROCEDURE — 97166 OT EVAL MOD COMPLEX 45 MIN: CPT

## 2018-01-01 PROCEDURE — 74011000272 HC RX REV CODE- 272: Performed by: NEUROLOGICAL SURGERY

## 2018-01-01 PROCEDURE — 81001 URINALYSIS AUTO W/SCOPE: CPT | Performed by: INTERNAL MEDICINE

## 2018-01-01 PROCEDURE — 71045 X-RAY EXAM CHEST 1 VIEW: CPT

## 2018-01-01 PROCEDURE — 96361 HYDRATE IV INFUSION ADD-ON: CPT

## 2018-01-01 PROCEDURE — 77030014647 HC SEAL FBRN TISSL BAXT -D: Performed by: NEUROLOGICAL SURGERY

## 2018-01-01 PROCEDURE — 85025 COMPLETE CBC W/AUTO DIFF WBC: CPT | Performed by: NURSE PRACTITIONER

## 2018-01-01 PROCEDURE — 92610 EVALUATE SWALLOWING FUNCTION: CPT

## 2018-01-01 PROCEDURE — 77030013797 HC KT TRNSDUC PRSSR EDWD -A: Performed by: ANESTHESIOLOGY

## 2018-01-01 PROCEDURE — 77030008684 HC TU ET CUF COVD -B: Performed by: ANESTHESIOLOGY

## 2018-01-01 PROCEDURE — 74011000250 HC RX REV CODE- 250

## 2018-01-01 PROCEDURE — 74011250636 HC RX REV CODE- 250/636: Performed by: EMERGENCY MEDICINE

## 2018-01-01 PROCEDURE — 74011250636 HC RX REV CODE- 250/636: Performed by: HOSPITALIST

## 2018-01-01 PROCEDURE — G8987 SELF CARE CURRENT STATUS: HCPCS

## 2018-01-01 PROCEDURE — 84132 ASSAY OF SERUM POTASSIUM: CPT | Performed by: EMERGENCY MEDICINE

## 2018-01-01 PROCEDURE — 85018 HEMOGLOBIN: CPT

## 2018-01-01 PROCEDURE — 97116 GAIT TRAINING THERAPY: CPT

## 2018-01-01 PROCEDURE — 88331 PATH CONSLTJ SURG 1 BLK 1SPC: CPT | Performed by: NEUROLOGICAL SURGERY

## 2018-01-01 PROCEDURE — 94762 N-INVAS EAR/PLS OXIMTRY CONT: CPT

## 2018-01-01 PROCEDURE — 93970 EXTREMITY STUDY: CPT

## 2018-01-01 PROCEDURE — 74011000258 HC RX REV CODE- 258

## 2018-01-01 PROCEDURE — 88360 TUMOR IMMUNOHISTOCHEM/MANUAL: CPT | Performed by: NEUROLOGICAL SURGERY

## 2018-01-01 PROCEDURE — 77030013137 HC MRK XR CRAN BUSA -A: Performed by: NEUROLOGICAL SURGERY

## 2018-01-01 PROCEDURE — 85025 COMPLETE CBC W/AUTO DIFF WBC: CPT | Performed by: HOSPITALIST

## 2018-01-01 PROCEDURE — 83605 ASSAY OF LACTIC ACID: CPT

## 2018-01-01 PROCEDURE — 76210000006 HC OR PH I REC 0.5 TO 1 HR: Performed by: NEUROLOGICAL SURGERY

## 2018-01-01 PROCEDURE — 72141 MRI NECK SPINE W/O DYE: CPT

## 2018-01-01 PROCEDURE — 76040000019: Performed by: INTERNAL MEDICINE

## 2018-01-01 PROCEDURE — 80048 BASIC METABOLIC PNL TOTAL CA: CPT | Performed by: EMERGENCY MEDICINE

## 2018-01-01 DEVICE — PLATE BONE LNG L16MM THK0.6MM 2 H TI STR FOR 1.5MM SCR: Type: IMPLANTABLE DEVICE | Site: SKULL | Status: FUNCTIONAL

## 2018-01-01 DEVICE — COVER BUR H SM DIA13MM THK0.5MM 5 H NEURO TI FOR 1.5MM SCR: Type: IMPLANTABLE DEVICE | Site: SKULL | Status: FUNCTIONAL

## 2018-01-01 DEVICE — COVER BUR H L DIA18.5MM THK0.5MM 5 H NEURO TI W/O TAB: Type: IMPLANTABLE DEVICE | Site: SKULL | Status: FUNCTIONAL

## 2018-01-01 DEVICE — SCREW BNE L3.5MM DIA1.5MM CORT MAXILLOMANDIBULAR GRN TI: Type: IMPLANTABLE DEVICE | Site: SKULL | Status: FUNCTIONAL

## 2018-01-01 RX ORDER — DIPHENHYDRAMINE HYDROCHLORIDE 50 MG/ML
25 INJECTION, SOLUTION INTRAMUSCULAR; INTRAVENOUS ONCE
Status: COMPLETED | OUTPATIENT
Start: 2018-01-01 | End: 2018-01-01

## 2018-01-01 RX ORDER — NALOXONE HYDROCHLORIDE 0.4 MG/ML
0.4 INJECTION, SOLUTION INTRAMUSCULAR; INTRAVENOUS; SUBCUTANEOUS AS NEEDED
Status: DISCONTINUED | OUTPATIENT
Start: 2018-01-01 | End: 2018-01-01 | Stop reason: HOSPADM

## 2018-01-01 RX ORDER — SODIUM CHLORIDE 9 MG/ML
25 INJECTION, SOLUTION INTRAVENOUS CONTINUOUS
Status: DISCONTINUED | OUTPATIENT
Start: 2018-01-01 | End: 2018-01-01 | Stop reason: HOSPADM

## 2018-01-01 RX ORDER — CAPECITABINE 500 MG/1
TABLET, FILM COATED ORAL
Status: ON HOLD | COMMUNITY
Start: 2018-01-01 | End: 2018-01-01

## 2018-01-01 RX ORDER — DEXAMETHASONE SODIUM PHOSPHATE 4 MG/ML
INJECTION, SOLUTION INTRA-ARTICULAR; INTRALESIONAL; INTRAMUSCULAR; INTRAVENOUS; SOFT TISSUE AS NEEDED
Status: DISCONTINUED | OUTPATIENT
Start: 2018-01-01 | End: 2018-01-01 | Stop reason: HOSPADM

## 2018-01-01 RX ORDER — SODIUM CHLORIDE 0.9 % (FLUSH) 0.9 %
5-10 SYRINGE (ML) INJECTION AS NEEDED
Status: DISCONTINUED | OUTPATIENT
Start: 2018-01-01 | End: 2018-01-01 | Stop reason: HOSPADM

## 2018-01-01 RX ORDER — BUTALBITAL, ACETAMINOPHEN AND CAFFEINE 300; 40; 50 MG/1; MG/1; MG/1
1 CAPSULE ORAL
COMMUNITY

## 2018-01-01 RX ORDER — ZOLPIDEM TARTRATE 5 MG/1
5 TABLET ORAL
Status: DISCONTINUED | OUTPATIENT
Start: 2018-01-01 | End: 2018-01-01 | Stop reason: HOSPADM

## 2018-01-01 RX ORDER — DOCUSATE SODIUM 100 MG/1
100 CAPSULE, LIQUID FILLED ORAL 2 TIMES DAILY
Status: DISCONTINUED | OUTPATIENT
Start: 2018-01-01 | End: 2018-01-01 | Stop reason: SDUPTHER

## 2018-01-01 RX ORDER — PANTOPRAZOLE SODIUM 40 MG/1
40 TABLET, DELAYED RELEASE ORAL
Qty: 30 TAB | Refills: 0 | Status: SHIPPED | OUTPATIENT
Start: 2018-01-01 | End: 2018-10-18

## 2018-01-01 RX ORDER — DOCUSATE SODIUM 100 MG/1
100 CAPSULE, LIQUID FILLED ORAL 2 TIMES DAILY
Status: DISCONTINUED | OUTPATIENT
Start: 2018-01-01 | End: 2018-01-01 | Stop reason: HOSPADM

## 2018-01-01 RX ORDER — ONDANSETRON 2 MG/ML
4 INJECTION INTRAMUSCULAR; INTRAVENOUS AS NEEDED
Status: DISCONTINUED | OUTPATIENT
Start: 2018-01-01 | End: 2018-01-01 | Stop reason: HOSPADM

## 2018-01-01 RX ORDER — ROCURONIUM BROMIDE 10 MG/ML
INJECTION, SOLUTION INTRAVENOUS AS NEEDED
Status: DISCONTINUED | OUTPATIENT
Start: 2018-01-01 | End: 2018-01-01 | Stop reason: HOSPADM

## 2018-01-01 RX ORDER — MIDAZOLAM HYDROCHLORIDE 1 MG/ML
1 INJECTION, SOLUTION INTRAMUSCULAR; INTRAVENOUS AS NEEDED
Status: DISCONTINUED | OUTPATIENT
Start: 2018-01-01 | End: 2018-01-01 | Stop reason: HOSPADM

## 2018-01-01 RX ORDER — POTASSIUM CHLORIDE AND SODIUM CHLORIDE 450; 150 MG/100ML; MG/100ML
INJECTION, SOLUTION INTRAVENOUS CONTINUOUS
Status: DISCONTINUED | OUTPATIENT
Start: 2018-01-01 | End: 2018-01-01 | Stop reason: HOSPADM

## 2018-01-01 RX ORDER — GADOTERATE MEGLUMINE 376.9 MG/ML
13 INJECTION INTRAVENOUS
Status: COMPLETED | OUTPATIENT
Start: 2018-01-01 | End: 2018-01-01

## 2018-01-01 RX ORDER — MONTELUKAST SODIUM 10 MG/1
TABLET ORAL
Refills: 4 | Status: ON HOLD | COMMUNITY
Start: 2018-01-01 | End: 2018-01-01 | Stop reason: DRUGHIGH

## 2018-01-01 RX ORDER — DIPHENHYDRAMINE HYDROCHLORIDE 50 MG/ML
12.5 INJECTION, SOLUTION INTRAMUSCULAR; INTRAVENOUS AS NEEDED
Status: DISCONTINUED | OUTPATIENT
Start: 2018-01-01 | End: 2018-01-01 | Stop reason: HOSPADM

## 2018-01-01 RX ORDER — HYDROCODONE BITARTRATE AND ACETAMINOPHEN 5; 325 MG/1; MG/1
1 TABLET ORAL
Qty: 14 TAB | Refills: 0 | Status: SHIPPED | OUTPATIENT
Start: 2018-01-01 | End: 2018-01-01 | Stop reason: ALTCHOICE

## 2018-01-01 RX ORDER — HEPARIN SODIUM (PORCINE) LOCK FLUSH IV SOLN 100 UNIT/ML 100 UNIT/ML
100 SOLUTION INTRAVENOUS
Status: DISCONTINUED | OUTPATIENT
Start: 2018-01-01 | End: 2018-01-01 | Stop reason: CLARIF

## 2018-01-01 RX ORDER — SODIUM CHLORIDE 0.9 % (FLUSH) 0.9 %
10-40 SYRINGE (ML) INJECTION EVERY 8 HOURS
Status: DISCONTINUED | OUTPATIENT
Start: 2018-01-01 | End: 2018-01-01 | Stop reason: HOSPADM

## 2018-01-01 RX ORDER — MONTELUKAST SODIUM 10 MG/1
10 TABLET ORAL DAILY
Status: DISCONTINUED | OUTPATIENT
Start: 2018-01-01 | End: 2018-01-01 | Stop reason: HOSPADM

## 2018-01-01 RX ORDER — SUCCINYLCHOLINE CHLORIDE 20 MG/ML
INJECTION INTRAMUSCULAR; INTRAVENOUS AS NEEDED
Status: DISCONTINUED | OUTPATIENT
Start: 2018-01-01 | End: 2018-01-01 | Stop reason: HOSPADM

## 2018-01-01 RX ORDER — LEVOFLOXACIN 500 MG/1
500 TABLET, FILM COATED ORAL DAILY
Qty: 10 TAB | Refills: 0 | Status: SHIPPED | OUTPATIENT
Start: 2018-01-01 | End: 2018-01-01 | Stop reason: ALTCHOICE

## 2018-01-01 RX ORDER — IBUPROFEN 200 MG
400 TABLET ORAL
COMMUNITY

## 2018-01-01 RX ORDER — BUDESONIDE 0.5 MG/2ML
500 INHALANT ORAL 2 TIMES DAILY
Qty: 60 EACH | Refills: 0 | Status: SHIPPED | OUTPATIENT
Start: 2018-01-01 | End: 2018-10-17

## 2018-01-01 RX ORDER — SODIUM CHLORIDE 9 MG/ML
100 INJECTION, SOLUTION INTRAVENOUS CONTINUOUS
Status: DISCONTINUED | OUTPATIENT
Start: 2018-01-01 | End: 2018-01-01

## 2018-01-01 RX ORDER — HYDROCODONE BITARTRATE AND ACETAMINOPHEN 5; 325 MG/1; MG/1
1 TABLET ORAL
Status: COMPLETED | OUTPATIENT
Start: 2018-01-01 | End: 2018-01-01

## 2018-01-01 RX ORDER — FENTANYL CITRATE 50 UG/ML
50 INJECTION, SOLUTION INTRAMUSCULAR; INTRAVENOUS AS NEEDED
Status: DISCONTINUED | OUTPATIENT
Start: 2018-01-01 | End: 2018-01-01 | Stop reason: HOSPADM

## 2018-01-01 RX ORDER — MORPHINE SULFATE 15 MG/1
15 TABLET ORAL
Status: DISCONTINUED | OUTPATIENT
Start: 2018-01-01 | End: 2018-01-01 | Stop reason: HOSPADM

## 2018-01-01 RX ORDER — LEVETIRACETAM 500 MG/1
1000 TABLET ORAL 2 TIMES DAILY
Qty: 120 TAB | Refills: 0 | Status: SHIPPED | OUTPATIENT
Start: 2018-01-01 | End: 2018-01-01

## 2018-01-01 RX ORDER — PANTOPRAZOLE SODIUM 40 MG/1
40 TABLET, DELAYED RELEASE ORAL
Status: DISCONTINUED | OUTPATIENT
Start: 2018-01-01 | End: 2018-01-01 | Stop reason: HOSPADM

## 2018-01-01 RX ORDER — GUAIFENESIN 600 MG/1
600 TABLET, EXTENDED RELEASE ORAL EVERY 12 HOURS
Status: DISCONTINUED | OUTPATIENT
Start: 2018-01-01 | End: 2018-01-01 | Stop reason: HOSPADM

## 2018-01-01 RX ORDER — DEXAMETHASONE SODIUM PHOSPHATE 4 MG/ML
4 INJECTION, SOLUTION INTRA-ARTICULAR; INTRALESIONAL; INTRAMUSCULAR; INTRAVENOUS; SOFT TISSUE EVERY 6 HOURS
Status: DISCONTINUED | OUTPATIENT
Start: 2018-01-01 | End: 2018-01-01

## 2018-01-01 RX ORDER — SODIUM CHLORIDE 0.9 % (FLUSH) 0.9 %
5-10 SYRINGE (ML) INJECTION EVERY 8 HOURS
Status: DISCONTINUED | OUTPATIENT
Start: 2018-01-01 | End: 2018-01-01 | Stop reason: HOSPADM

## 2018-01-01 RX ORDER — LIDOCAINE HYDROCHLORIDE 40 MG/ML
SOLUTION TOPICAL ONCE
Status: COMPLETED | OUTPATIENT
Start: 2018-01-01 | End: 2018-01-01

## 2018-01-01 RX ORDER — DEXAMETHASONE 4 MG/1
4 TABLET ORAL 2 TIMES DAILY WITH MEALS
Qty: 30 TAB | Refills: 0 | Status: SHIPPED | OUTPATIENT
Start: 2018-01-01 | End: 2018-01-01

## 2018-01-01 RX ORDER — ACETAMINOPHEN 325 MG/1
650 TABLET ORAL
Status: DISCONTINUED | OUTPATIENT
Start: 2018-01-01 | End: 2018-01-01 | Stop reason: SDUPTHER

## 2018-01-01 RX ORDER — IPRATROPIUM BROMIDE AND ALBUTEROL SULFATE 2.5; .5 MG/3ML; MG/3ML
3 SOLUTION RESPIRATORY (INHALATION)
Status: DISCONTINUED | OUTPATIENT
Start: 2018-01-01 | End: 2018-01-01 | Stop reason: HOSPADM

## 2018-01-01 RX ORDER — HEPARIN 100 UNIT/ML
300-500 SYRINGE INTRAVENOUS AS NEEDED
Status: DISCONTINUED | OUTPATIENT
Start: 2018-01-01 | End: 2018-01-01 | Stop reason: HOSPADM

## 2018-01-01 RX ORDER — ACETAMINOPHEN 325 MG/1
650 TABLET ORAL
Status: DISCONTINUED | OUTPATIENT
Start: 2018-01-01 | End: 2018-01-01 | Stop reason: HOSPADM

## 2018-01-01 RX ORDER — PROMETHAZINE HYDROCHLORIDE 25 MG/1
25 TABLET ORAL
Status: DISCONTINUED | OUTPATIENT
Start: 2018-01-01 | End: 2018-01-01 | Stop reason: HOSPADM

## 2018-01-01 RX ORDER — OXYCODONE HYDROCHLORIDE 5 MG/1
5 CAPSULE ORAL
Qty: 60 CAP | Refills: 0 | Status: SHIPPED | OUTPATIENT
Start: 2018-01-01 | End: 2018-01-01 | Stop reason: SDUPTHER

## 2018-01-01 RX ORDER — DEXAMETHASONE 4 MG/1
4 TABLET ORAL 2 TIMES DAILY WITH MEALS
Qty: 10 TAB | Refills: 0 | Status: SHIPPED | OUTPATIENT
Start: 2018-01-01 | End: 2018-01-01

## 2018-01-01 RX ORDER — FENTANYL CITRATE 50 UG/ML
25-50 INJECTION, SOLUTION INTRAMUSCULAR; INTRAVENOUS
Status: DISCONTINUED | OUTPATIENT
Start: 2018-01-01 | End: 2018-01-01 | Stop reason: HOSPADM

## 2018-01-01 RX ORDER — HEPARIN 100 UNIT/ML
500 SYRINGE INTRAVENOUS
Status: DISCONTINUED | OUTPATIENT
Start: 2018-01-01 | End: 2018-01-01 | Stop reason: HOSPADM

## 2018-01-01 RX ORDER — SODIUM CHLORIDE 0.9 % (FLUSH) 0.9 %
10 SYRINGE (ML) INJECTION
Status: COMPLETED | OUTPATIENT
Start: 2018-01-01 | End: 2018-01-01

## 2018-01-01 RX ORDER — LIDOCAINE HYDROCHLORIDE 20 MG/ML
INJECTION, SOLUTION EPIDURAL; INFILTRATION; INTRACAUDAL; PERINEURAL AS NEEDED
Status: DISCONTINUED | OUTPATIENT
Start: 2018-01-01 | End: 2018-01-01 | Stop reason: HOSPADM

## 2018-01-01 RX ORDER — SODIUM CHLORIDE 0.9 % (FLUSH) 0.9 %
10 SYRINGE (ML) INJECTION
Status: CANCELLED | OUTPATIENT
Start: 2018-01-01 | End: 2018-01-01

## 2018-01-01 RX ORDER — OXYCODONE HYDROCHLORIDE 5 MG/1
5 CAPSULE ORAL
Qty: 60 CAP | Refills: 0 | Status: ON HOLD | OUTPATIENT
Start: 2018-01-01 | End: 2018-01-01 | Stop reason: DRUGHIGH

## 2018-01-01 RX ORDER — HYDROMORPHONE HYDROCHLORIDE 1 MG/ML
0.2 INJECTION, SOLUTION INTRAMUSCULAR; INTRAVENOUS; SUBCUTANEOUS
Status: DISCONTINUED | OUTPATIENT
Start: 2018-01-01 | End: 2018-01-01 | Stop reason: HOSPADM

## 2018-01-01 RX ORDER — LIDOCAINE HYDROCHLORIDE 20 MG/ML
5 SOLUTION OROPHARYNGEAL ONCE
Status: COMPLETED | OUTPATIENT
Start: 2018-01-01 | End: 2018-01-01

## 2018-01-01 RX ORDER — CLONIDINE HYDROCHLORIDE 0.1 MG/1
0.1 TABLET ORAL 2 TIMES DAILY
Qty: 60 TAB | Refills: 0 | Status: SHIPPED | OUTPATIENT
Start: 2018-01-01 | End: 2018-10-17

## 2018-01-01 RX ORDER — HEPARIN SODIUM (PORCINE) LOCK FLUSH IV SOLN 100 UNIT/ML 100 UNIT/ML
500 SOLUTION INTRAVENOUS
Status: DISCONTINUED | OUTPATIENT
Start: 2018-01-01 | End: 2018-01-01 | Stop reason: SDUPTHER

## 2018-01-01 RX ORDER — ALPRAZOLAM 0.5 MG/1
0.5 TABLET ORAL
Status: DISCONTINUED | OUTPATIENT
Start: 2018-01-01 | End: 2018-01-01 | Stop reason: HOSPADM

## 2018-01-01 RX ORDER — NALTREXONE HYDROCHLORIDE 50 MG/1
4.5 TABLET, FILM COATED ORAL DAILY
Status: ON HOLD | COMMUNITY
End: 2018-01-01

## 2018-01-01 RX ORDER — DEXAMETHASONE 4 MG/1
6 TABLET ORAL EVERY 12 HOURS
Status: DISCONTINUED | OUTPATIENT
Start: 2018-01-01 | End: 2018-01-01 | Stop reason: DRUGHIGH

## 2018-01-01 RX ORDER — SODIUM CHLORIDE 0.9 % (FLUSH) 0.9 %
10-30 SYRINGE (ML) INJECTION AS NEEDED
Status: DISCONTINUED | OUTPATIENT
Start: 2018-01-01 | End: 2018-01-01 | Stop reason: HOSPADM

## 2018-01-01 RX ORDER — ENOXAPARIN SODIUM 100 MG/ML
1 INJECTION SUBCUTANEOUS
Status: COMPLETED | OUTPATIENT
Start: 2018-01-01 | End: 2018-01-01

## 2018-01-01 RX ORDER — GADOTERATE MEGLUMINE 376.9 MG/ML
12 INJECTION INTRAVENOUS
Status: COMPLETED | OUTPATIENT
Start: 2018-01-01 | End: 2018-01-01

## 2018-01-01 RX ORDER — MORPHINE SULFATE 2 MG/ML
2 INJECTION, SOLUTION INTRAMUSCULAR; INTRAVENOUS
Status: DISCONTINUED | OUTPATIENT
Start: 2018-01-01 | End: 2018-01-01 | Stop reason: HOSPADM

## 2018-01-01 RX ORDER — ONDANSETRON 2 MG/ML
4 INJECTION INTRAMUSCULAR; INTRAVENOUS
Status: DISCONTINUED | OUTPATIENT
Start: 2018-01-01 | End: 2018-01-01 | Stop reason: HOSPADM

## 2018-01-01 RX ORDER — ONDANSETRON 8 MG/1
8 TABLET, ORALLY DISINTEGRATING ORAL
Qty: 30 TAB | Refills: 2 | Status: ON HOLD | OUTPATIENT
Start: 2018-01-01 | End: 2018-01-01

## 2018-01-01 RX ORDER — SODIUM CHLORIDE 9 MG/ML
75 INJECTION, SOLUTION INTRAVENOUS CONTINUOUS
Status: DISCONTINUED | OUTPATIENT
Start: 2018-01-01 | End: 2018-01-01

## 2018-01-01 RX ORDER — MONTELUKAST SODIUM 10 MG/1
10 TABLET ORAL DAILY
COMMUNITY

## 2018-01-01 RX ORDER — PROMETHAZINE HYDROCHLORIDE 25 MG/1
25 TABLET ORAL
COMMUNITY

## 2018-01-01 RX ORDER — PROPOFOL 10 MG/ML
INJECTION, EMULSION INTRAVENOUS AS NEEDED
Status: DISCONTINUED | OUTPATIENT
Start: 2018-01-01 | End: 2018-01-01 | Stop reason: HOSPADM

## 2018-01-01 RX ORDER — FLUTICASONE PROPIONATE 50 MCG
SPRAY, SUSPENSION (ML) NASAL
Refills: 3 | Status: ON HOLD | COMMUNITY
Start: 2018-01-01 | End: 2018-01-01

## 2018-01-01 RX ORDER — CAPECITABINE 500 MG/1
1500 TABLET, FILM COATED ORAL 2 TIMES DAILY
Qty: 84 TAB | Refills: 3 | Status: SHIPPED | OUTPATIENT
Start: 2018-01-01 | End: 2018-01-01

## 2018-01-01 RX ORDER — BUDESONIDE 0.5 MG/2ML
500 INHALANT ORAL
Status: DISCONTINUED | OUTPATIENT
Start: 2018-01-01 | End: 2018-01-01 | Stop reason: HOSPADM

## 2018-01-01 RX ORDER — LORAZEPAM 1 MG/1
1 TABLET ORAL
Status: COMPLETED | OUTPATIENT
Start: 2018-01-01 | End: 2018-01-01

## 2018-01-01 RX ORDER — LIDOCAINE HYDROCHLORIDE 20 MG/ML
20 INJECTION, SOLUTION INFILTRATION; PERINEURAL ONCE
Status: COMPLETED | OUTPATIENT
Start: 2018-01-01 | End: 2018-01-01

## 2018-01-01 RX ORDER — LAPATINIB 250 MG/1
TABLET ORAL
Status: ON HOLD | COMMUNITY
Start: 2018-01-01 | End: 2018-01-01

## 2018-01-01 RX ORDER — FENTANYL CITRATE 50 UG/ML
INJECTION, SOLUTION INTRAMUSCULAR; INTRAVENOUS AS NEEDED
Status: DISCONTINUED | OUTPATIENT
Start: 2018-01-01 | End: 2018-01-01 | Stop reason: HOSPADM

## 2018-01-01 RX ORDER — SODIUM CHLORIDE, SODIUM LACTATE, POTASSIUM CHLORIDE, CALCIUM CHLORIDE 600; 310; 30; 20 MG/100ML; MG/100ML; MG/100ML; MG/100ML
125 INJECTION, SOLUTION INTRAVENOUS CONTINUOUS
Status: DISCONTINUED | OUTPATIENT
Start: 2018-01-01 | End: 2018-01-01 | Stop reason: HOSPADM

## 2018-01-01 RX ORDER — ENOXAPARIN SODIUM 100 MG/ML
40 INJECTION SUBCUTANEOUS EVERY 24 HOURS
Status: DISCONTINUED | OUTPATIENT
Start: 2018-01-01 | End: 2018-01-01 | Stop reason: HOSPADM

## 2018-01-01 RX ORDER — LIDOCAINE HYDROCHLORIDE 10 MG/ML
0.1 INJECTION, SOLUTION EPIDURAL; INFILTRATION; INTRACAUDAL; PERINEURAL AS NEEDED
Status: DISCONTINUED | OUTPATIENT
Start: 2018-01-01 | End: 2018-01-01 | Stop reason: HOSPADM

## 2018-01-01 RX ORDER — MANNITOL 20 G/100ML
INJECTION, SOLUTION INTRAVENOUS
Status: DISCONTINUED | OUTPATIENT
Start: 2018-01-01 | End: 2018-01-01 | Stop reason: HOSPADM

## 2018-01-01 RX ORDER — DEXAMETHASONE SODIUM PHOSPHATE 10 MG/ML
10 INJECTION INTRAMUSCULAR; INTRAVENOUS ONCE
Status: COMPLETED | OUTPATIENT
Start: 2018-01-01 | End: 2018-01-01

## 2018-01-01 RX ORDER — ONDANSETRON 8 MG/1
8 TABLET, ORALLY DISINTEGRATING ORAL
Qty: 30 TAB | Refills: 5 | Status: ON HOLD | OUTPATIENT
Start: 2018-01-01 | End: 2018-01-01

## 2018-01-01 RX ORDER — MIDAZOLAM HYDROCHLORIDE 1 MG/ML
0.5 INJECTION, SOLUTION INTRAMUSCULAR; INTRAVENOUS
Status: DISCONTINUED | OUTPATIENT
Start: 2018-01-01 | End: 2018-01-01 | Stop reason: HOSPADM

## 2018-01-01 RX ORDER — NALOXONE HYDROCHLORIDE 0.4 MG/ML
0.1 INJECTION, SOLUTION INTRAMUSCULAR; INTRAVENOUS; SUBCUTANEOUS
Status: DISCONTINUED | OUTPATIENT
Start: 2018-01-01 | End: 2018-01-01 | Stop reason: HOSPADM

## 2018-01-01 RX ORDER — PROPOFOL 10 MG/ML
INJECTION, EMULSION INTRAVENOUS
Status: DISCONTINUED | OUTPATIENT
Start: 2018-01-01 | End: 2018-01-01 | Stop reason: HOSPADM

## 2018-01-01 RX ORDER — BUPIVACAINE HYDROCHLORIDE AND EPINEPHRINE 5; 5 MG/ML; UG/ML
INJECTION, SOLUTION EPIDURAL; INTRACAUDAL; PERINEURAL AS NEEDED
Status: DISCONTINUED | OUTPATIENT
Start: 2018-01-01 | End: 2018-01-01 | Stop reason: HOSPADM

## 2018-01-01 RX ORDER — HYDROCODONE BITARTRATE AND ACETAMINOPHEN 5; 325 MG/1; MG/1
1 TABLET ORAL
Status: DISCONTINUED | OUTPATIENT
Start: 2018-01-01 | End: 2018-01-01 | Stop reason: SDUPTHER

## 2018-01-01 RX ORDER — FENTANYL CITRATE 50 UG/ML
25 INJECTION, SOLUTION INTRAMUSCULAR; INTRAVENOUS
Status: DISCONTINUED | OUTPATIENT
Start: 2018-01-01 | End: 2018-01-01 | Stop reason: HOSPADM

## 2018-01-01 RX ORDER — CEFAZOLIN SODIUM/WATER 2 G/20 ML
2 SYRINGE (ML) INTRAVENOUS EVERY 8 HOURS
Status: COMPLETED | OUTPATIENT
Start: 2018-01-01 | End: 2018-01-01

## 2018-01-01 RX ORDER — MIDAZOLAM HYDROCHLORIDE 1 MG/ML
.25-5 INJECTION, SOLUTION INTRAMUSCULAR; INTRAVENOUS
Status: DISCONTINUED | OUTPATIENT
Start: 2018-01-01 | End: 2018-01-01 | Stop reason: HOSPADM

## 2018-01-01 RX ORDER — HEPARIN SODIUM (PORCINE) LOCK FLUSH IV SOLN 100 UNIT/ML 100 UNIT/ML
100 SOLUTION INTRAVENOUS ONCE
Qty: 1 ML | Refills: 0
Start: 2018-01-01 | End: 2018-01-01

## 2018-01-01 RX ORDER — THERA TABS 400 MCG
1 TAB ORAL DAILY
Status: DISCONTINUED | OUTPATIENT
Start: 2018-01-01 | End: 2018-01-01 | Stop reason: HOSPADM

## 2018-01-01 RX ORDER — HEPARIN 100 UNIT/ML
SYRINGE INTRAVENOUS
Status: DISPENSED
Start: 2018-01-01 | End: 2018-01-01

## 2018-01-01 RX ORDER — GUAIFENESIN 600 MG/1
600 TABLET, EXTENDED RELEASE ORAL EVERY 12 HOURS
Qty: 20 TAB | Refills: 0 | Status: SHIPPED | OUTPATIENT
Start: 2018-01-01 | End: 2018-09-27

## 2018-01-01 RX ORDER — DEXAMETHASONE 6 MG/1
TABLET ORAL
Qty: 12 TAB | Refills: 0 | Status: ON HOLD | OUTPATIENT
Start: 2018-01-01 | End: 2018-01-01

## 2018-01-01 RX ORDER — DEXAMETHASONE SODIUM PHOSPHATE 4 MG/ML
4 INJECTION, SOLUTION INTRA-ARTICULAR; INTRALESIONAL; INTRAMUSCULAR; INTRAVENOUS; SOFT TISSUE EVERY 6 HOURS
Status: DISCONTINUED | OUTPATIENT
Start: 2018-01-01 | End: 2018-01-01 | Stop reason: ALTCHOICE

## 2018-01-01 RX ORDER — LORAZEPAM 2 MG/ML
1 INJECTION INTRAMUSCULAR
Status: DISCONTINUED | OUTPATIENT
Start: 2018-01-01 | End: 2018-01-01 | Stop reason: HOSPADM

## 2018-01-01 RX ORDER — DEXAMETHASONE 4 MG/1
4 TABLET ORAL EVERY 6 HOURS
Status: DISCONTINUED | OUTPATIENT
Start: 2018-01-01 | End: 2018-01-01 | Stop reason: HOSPADM

## 2018-01-01 RX ORDER — DEXAMETHASONE 4 MG/1
4 TABLET ORAL EVERY 12 HOURS
Status: DISCONTINUED | OUTPATIENT
Start: 2018-01-01 | End: 2018-01-01 | Stop reason: HOSPADM

## 2018-01-01 RX ORDER — PROCHLORPERAZINE MALEATE 10 MG
5 TABLET ORAL
Qty: 30 TAB | Refills: 1 | Status: ON HOLD | OUTPATIENT
Start: 2018-01-01 | End: 2018-01-01

## 2018-01-01 RX ORDER — DEXAMETHASONE 4 MG/1
4 TABLET ORAL EVERY 6 HOURS
Status: DISCONTINUED | OUTPATIENT
Start: 2018-01-01 | End: 2018-01-01 | Stop reason: SDUPTHER

## 2018-01-01 RX ORDER — DIPHENHYDRAMINE HYDROCHLORIDE 50 MG/ML
25 INJECTION, SOLUTION INTRAMUSCULAR; INTRAVENOUS
Status: COMPLETED | OUTPATIENT
Start: 2018-01-01 | End: 2018-01-01

## 2018-01-01 RX ORDER — OXYCODONE HYDROCHLORIDE 5 MG/1
5 TABLET ORAL
COMMUNITY

## 2018-01-01 RX ORDER — DEXAMETHASONE 2 MG/1
4 TABLET ORAL 2 TIMES DAILY WITH MEALS
COMMUNITY

## 2018-01-01 RX ORDER — ACETAMINOPHEN 500 MG
500 TABLET ORAL
COMMUNITY

## 2018-01-01 RX ORDER — PROCHLORPERAZINE EDISYLATE 5 MG/ML
10 INJECTION INTRAMUSCULAR; INTRAVENOUS
Status: DISCONTINUED | OUTPATIENT
Start: 2018-01-01 | End: 2018-01-01 | Stop reason: HOSPADM

## 2018-01-01 RX ORDER — FLUTICASONE PROPIONATE 50 MCG
2 SPRAY, SUSPENSION (ML) NASAL DAILY
Status: DISCONTINUED | OUTPATIENT
Start: 2018-01-01 | End: 2018-01-01

## 2018-01-01 RX ORDER — MORPHINE SULFATE 10 MG/ML
2 INJECTION, SOLUTION INTRAMUSCULAR; INTRAVENOUS
Status: DISCONTINUED | OUTPATIENT
Start: 2018-01-01 | End: 2018-01-01 | Stop reason: HOSPADM

## 2018-01-01 RX ORDER — MELATONIN
4000 DAILY
Status: DISCONTINUED | OUTPATIENT
Start: 2018-01-01 | End: 2018-01-01 | Stop reason: HOSPADM

## 2018-01-01 RX ORDER — ONDANSETRON HYDROCHLORIDE 8 MG/1
8 TABLET, FILM COATED ORAL
Qty: 30 TAB | Refills: 2 | Status: SHIPPED | OUTPATIENT
Start: 2018-01-01 | End: 2018-01-01 | Stop reason: SDUPTHER

## 2018-01-01 RX ORDER — ONDANSETRON 2 MG/ML
4 INJECTION INTRAMUSCULAR; INTRAVENOUS
Status: DISCONTINUED | OUTPATIENT
Start: 2018-01-01 | End: 2018-01-01 | Stop reason: SDUPTHER

## 2018-01-01 RX ORDER — FLUMAZENIL 0.1 MG/ML
0.2 INJECTION INTRAVENOUS
Status: DISCONTINUED | OUTPATIENT
Start: 2018-01-01 | End: 2018-01-01 | Stop reason: HOSPADM

## 2018-01-01 RX ORDER — HEPARIN SODIUM (PORCINE) LOCK FLUSH IV SOLN 100 UNIT/ML 100 UNIT/ML
100 SOLUTION INTRAVENOUS
Status: DISCONTINUED | OUTPATIENT
Start: 2018-01-01 | End: 2018-01-01 | Stop reason: HOSPADM

## 2018-01-01 RX ORDER — CLONIDINE HYDROCHLORIDE 0.1 MG/1
0.1 TABLET ORAL 2 TIMES DAILY
Status: DISCONTINUED | OUTPATIENT
Start: 2018-01-01 | End: 2018-01-01 | Stop reason: HOSPADM

## 2018-01-01 RX ORDER — ONDANSETRON 2 MG/ML
INJECTION INTRAMUSCULAR; INTRAVENOUS AS NEEDED
Status: DISCONTINUED | OUTPATIENT
Start: 2018-01-01 | End: 2018-01-01 | Stop reason: HOSPADM

## 2018-01-01 RX ORDER — CEFAZOLIN SODIUM IN 0.9 % NACL 2 G/100 ML
PLASTIC BAG, INJECTION (ML) INTRAVENOUS AS NEEDED
Status: DISCONTINUED | OUTPATIENT
Start: 2018-01-01 | End: 2018-01-01 | Stop reason: HOSPADM

## 2018-01-01 RX ORDER — METRONIDAZOLE 500 MG/100ML
500 INJECTION, SOLUTION INTRAVENOUS EVERY 12 HOURS
Status: DISCONTINUED | OUTPATIENT
Start: 2018-01-01 | End: 2018-01-01

## 2018-01-01 RX ORDER — ENOXAPARIN SODIUM 100 MG/ML
1 INJECTION SUBCUTANEOUS EVERY 12 HOURS
Status: DISCONTINUED | OUTPATIENT
Start: 2018-01-01 | End: 2018-01-01

## 2018-01-01 RX ORDER — OXYCODONE HYDROCHLORIDE 5 MG/1
5 TABLET ORAL
Status: DISCONTINUED | OUTPATIENT
Start: 2018-01-01 | End: 2018-01-01 | Stop reason: HOSPADM

## 2018-01-01 RX ORDER — GADOTERATE MEGLUMINE 376.9 MG/ML
12 INJECTION INTRAVENOUS
Status: DISCONTINUED | OUTPATIENT
Start: 2018-01-01 | End: 2018-01-01 | Stop reason: HOSPADM

## 2018-01-01 RX ORDER — ASCORBIC ACID 500 MG
250 TABLET ORAL DAILY
Status: DISCONTINUED | OUTPATIENT
Start: 2018-01-01 | End: 2018-01-01 | Stop reason: HOSPADM

## 2018-01-01 RX ORDER — OXYCODONE AND ACETAMINOPHEN 5; 325 MG/1; MG/1
1 TABLET ORAL AS NEEDED
Status: DISCONTINUED | OUTPATIENT
Start: 2018-01-01 | End: 2018-01-01 | Stop reason: HOSPADM

## 2018-01-01 RX ORDER — HYDROCODONE BITARTRATE AND ACETAMINOPHEN 5; 325 MG/1; MG/1
1 TABLET ORAL
Status: DISCONTINUED | OUTPATIENT
Start: 2018-01-01 | End: 2018-01-01 | Stop reason: HOSPADM

## 2018-01-01 RX ORDER — ALPRAZOLAM 0.5 MG/1
0.5 TABLET ORAL
Qty: 20 TAB | Refills: 0 | Status: SHIPPED | OUTPATIENT
Start: 2018-01-01 | End: 2018-10-17

## 2018-01-01 RX ORDER — LAPATINIB 250 MG/1
1250 TABLET ORAL DAILY
Qty: 105 TAB | Refills: 4 | Status: SHIPPED | OUTPATIENT
Start: 2018-01-01 | End: 2018-01-01

## 2018-01-01 RX ADMIN — Medication 2 G: at 06:28

## 2018-01-01 RX ADMIN — LORAZEPAM 1 MG: 2 INJECTION INTRAMUSCULAR; INTRAVENOUS at 10:50

## 2018-01-01 RX ADMIN — MANNITOL: 20 INJECTION, SOLUTION INTRAVENOUS at 13:50

## 2018-01-01 RX ADMIN — DEXAMETHASONE 4 MG: 4 TABLET ORAL at 07:36

## 2018-01-01 RX ADMIN — LEVETIRACETAM 500 MG: 100 INJECTION, SOLUTION, CONCENTRATE INTRAVENOUS at 00:09

## 2018-01-01 RX ADMIN — ENOXAPARIN SODIUM 60 MG: 60 INJECTION SUBCUTANEOUS at 13:23

## 2018-01-01 RX ADMIN — PROMETHAZINE HYDROCHLORIDE 25 MG: 25 TABLET ORAL at 17:19

## 2018-01-01 RX ADMIN — SODIUM CHLORIDE, PRESERVATIVE FREE 500 UNITS: 5 INJECTION INTRAVENOUS at 10:57

## 2018-01-01 RX ADMIN — HYDROCODONE BITARTRATE AND ACETAMINOPHEN 1 TABLET: 5; 325 TABLET ORAL at 23:02

## 2018-01-01 RX ADMIN — Medication 10 ML: at 06:00

## 2018-01-01 RX ADMIN — DEXAMETHASONE 4 MG: 4 TABLET ORAL at 13:19

## 2018-01-01 RX ADMIN — Medication 2 G: at 14:05

## 2018-01-01 RX ADMIN — AZITHROMYCIN MONOHYDRATE 500 MG: 500 INJECTION, POWDER, LYOPHILIZED, FOR SOLUTION INTRAVENOUS at 15:24

## 2018-01-01 RX ADMIN — LEVETIRACETAM 500 MG: 100 INJECTION, SOLUTION, CONCENTRATE INTRAVENOUS at 15:16

## 2018-01-01 RX ADMIN — SODIUM CHLORIDE, SODIUM LACTATE, POTASSIUM CHLORIDE, AND CALCIUM CHLORIDE 125 ML/HR: 600; 310; 30; 20 INJECTION, SOLUTION INTRAVENOUS at 12:52

## 2018-01-01 RX ADMIN — DEXAMETHASONE 4 MG: 4 TABLET ORAL at 03:43

## 2018-01-01 RX ADMIN — GADOTERIDOL 13 ML: 279.3 INJECTION, SOLUTION INTRAVENOUS at 12:00

## 2018-01-01 RX ADMIN — SODIUM CHLORIDE 75 ML/HR: 900 INJECTION, SOLUTION INTRAVENOUS at 07:18

## 2018-01-01 RX ADMIN — LEVETIRACETAM 500 MG: 100 INJECTION, SOLUTION, CONCENTRATE INTRAVENOUS at 14:05

## 2018-01-01 RX ADMIN — Medication 10 ML: at 18:09

## 2018-01-01 RX ADMIN — DEXAMETHASONE SODIUM PHOSPHATE 4 MG: 4 INJECTION, SOLUTION INTRAMUSCULAR; INTRAVENOUS at 10:06

## 2018-01-01 RX ADMIN — FENTANYL CITRATE 25 MCG: 50 INJECTION, SOLUTION INTRAMUSCULAR; INTRAVENOUS at 17:12

## 2018-01-01 RX ADMIN — Medication 10 ML: at 21:21

## 2018-01-01 RX ADMIN — LIDOCAINE HYDROCHLORIDE: 20 SOLUTION ORAL; TOPICAL at 13:18

## 2018-01-01 RX ADMIN — Medication 10 ML: at 17:22

## 2018-01-01 RX ADMIN — DOCUSATE SODIUM 100 MG: 100 CAPSULE, LIQUID FILLED ORAL at 16:37

## 2018-01-01 RX ADMIN — Medication 10 ML: at 04:05

## 2018-01-01 RX ADMIN — METRONIDAZOLE 500 MG: 500 INJECTION, SOLUTION INTRAVENOUS at 17:19

## 2018-01-01 RX ADMIN — FAMOTIDINE 20 MG: 10 INJECTION, SOLUTION INTRAVENOUS at 03:42

## 2018-01-01 RX ADMIN — ZOLPIDEM TARTRATE 5 MG: 5 TABLET ORAL at 21:05

## 2018-01-01 RX ADMIN — PROPOFOL 200 MG: 10 INJECTION, EMULSION INTRAVENOUS at 13:43

## 2018-01-01 RX ADMIN — LIDOCAINE HYDROCHLORIDE: 40 SOLUTION TOPICAL at 13:08

## 2018-01-01 RX ADMIN — IPRATROPIUM BROMIDE AND ALBUTEROL SULFATE 3 ML: .5; 3 SOLUTION RESPIRATORY (INHALATION) at 15:35

## 2018-01-01 RX ADMIN — DOCUSATE SODIUM 100 MG: 100 CAPSULE, LIQUID FILLED ORAL at 08:30

## 2018-01-01 RX ADMIN — HYDROCODONE BITARTRATE AND ACETAMINOPHEN 1 TABLET: 5; 325 TABLET ORAL at 22:02

## 2018-01-01 RX ADMIN — DOCUSATE SODIUM 100 MG: 100 CAPSULE, LIQUID FILLED ORAL at 08:11

## 2018-01-01 RX ADMIN — IOPAMIDOL 100 ML: 755 INJECTION, SOLUTION INTRAVENOUS at 13:41

## 2018-01-01 RX ADMIN — AZITHROMYCIN MONOHYDRATE 500 MG: 500 INJECTION, POWDER, LYOPHILIZED, FOR SOLUTION INTRAVENOUS at 13:22

## 2018-01-01 RX ADMIN — ACETAMINOPHEN 650 MG: 325 TABLET, FILM COATED ORAL at 02:48

## 2018-01-01 RX ADMIN — DEXAMETHASONE SODIUM PHOSPHATE 4 MG: 4 INJECTION, SOLUTION INTRAMUSCULAR; INTRAVENOUS at 19:05

## 2018-01-01 RX ADMIN — PROPOFOL 100 MCG/KG/MIN: 10 INJECTION, EMULSION INTRAVENOUS at 14:21

## 2018-01-01 RX ADMIN — Medication 10 ML: at 04:49

## 2018-01-01 RX ADMIN — SODIUM CHLORIDE 100 ML/HR: 900 INJECTION, SOLUTION INTRAVENOUS at 02:20

## 2018-01-01 RX ADMIN — Medication 10 ML: at 18:00

## 2018-01-01 RX ADMIN — ZOLPIDEM TARTRATE 5 MG: 5 TABLET ORAL at 21:22

## 2018-01-01 RX ADMIN — ROCURONIUM BROMIDE 45 MG: 10 INJECTION, SOLUTION INTRAVENOUS at 13:48

## 2018-01-01 RX ADMIN — GADOTERATE MEGLUMINE 12 ML: 376.9 INJECTION INTRAVENOUS at 14:43

## 2018-01-01 RX ADMIN — Medication 10 ML: at 21:31

## 2018-01-01 RX ADMIN — ENOXAPARIN SODIUM 40 MG: 40 INJECTION, SOLUTION INTRAVENOUS; SUBCUTANEOUS at 21:22

## 2018-01-01 RX ADMIN — PROMETHAZINE HYDROCHLORIDE 25 MG: 25 TABLET ORAL at 20:12

## 2018-01-01 RX ADMIN — AZITHROMYCIN MONOHYDRATE 500 MG: 500 INJECTION, POWDER, LYOPHILIZED, FOR SOLUTION INTRAVENOUS at 15:18

## 2018-01-01 RX ADMIN — LORAZEPAM 1 MG: 2 INJECTION INTRAMUSCULAR; INTRAVENOUS at 21:28

## 2018-01-01 RX ADMIN — GUAIFENESIN 600 MG: 600 TABLET, EXTENDED RELEASE ORAL at 08:57

## 2018-01-01 RX ADMIN — Medication 10 ML: at 04:31

## 2018-01-01 RX ADMIN — DOCUSATE SODIUM 100 MG: 100 CAPSULE, LIQUID FILLED ORAL at 18:23

## 2018-01-01 RX ADMIN — LORAZEPAM 1 MG: 2 INJECTION INTRAMUSCULAR; INTRAVENOUS at 05:10

## 2018-01-01 RX ADMIN — METRONIDAZOLE 500 MG: 500 INJECTION, SOLUTION INTRAVENOUS at 05:10

## 2018-01-01 RX ADMIN — Medication 10 ML: at 12:00

## 2018-01-01 RX ADMIN — DEXAMETHASONE 4 MG: 4 TABLET ORAL at 23:02

## 2018-01-01 RX ADMIN — FAMOTIDINE 20 MG: 10 INJECTION, SOLUTION INTRAVENOUS at 02:40

## 2018-01-01 RX ADMIN — MIDAZOLAM HYDROCHLORIDE 2.5 MG: 1 INJECTION, SOLUTION INTRAMUSCULAR; INTRAVENOUS at 13:46

## 2018-01-01 RX ADMIN — LIDOCAINE HYDROCHLORIDE 60 MG: 20 INJECTION, SOLUTION EPIDURAL; INFILTRATION; INTRACAUDAL; PERINEURAL at 13:43

## 2018-01-01 RX ADMIN — DOCUSATE SODIUM 100 MG: 100 CAPSULE, LIQUID FILLED ORAL at 09:05

## 2018-01-01 RX ADMIN — DEXAMETHASONE 4 MG: 4 TABLET ORAL at 21:37

## 2018-01-01 RX ADMIN — Medication 10 ML: at 21:05

## 2018-01-01 RX ADMIN — DEXAMETHASONE 4 MG: 4 TABLET ORAL at 16:17

## 2018-01-01 RX ADMIN — MONTELUKAST SODIUM 10 MG: 10 TABLET, COATED ORAL at 09:36

## 2018-01-01 RX ADMIN — Medication 10 ML: at 06:28

## 2018-01-01 RX ADMIN — ONDANSETRON 4 MG: 2 INJECTION INTRAMUSCULAR; INTRAVENOUS at 15:48

## 2018-01-01 RX ADMIN — LORAZEPAM 1 MG: 2 INJECTION INTRAMUSCULAR; INTRAVENOUS at 22:15

## 2018-01-01 RX ADMIN — Medication 10 ML: at 21:28

## 2018-01-01 RX ADMIN — DEXAMETHASONE 4 MG: 4 TABLET ORAL at 06:28

## 2018-01-01 RX ADMIN — SODIUM CHLORIDE 1000 ML: 900 INJECTION, SOLUTION INTRAVENOUS at 12:32

## 2018-01-01 RX ADMIN — METHYLPREDNISOLONE SODIUM SUCCINATE 40 MG: 40 INJECTION, POWDER, FOR SOLUTION INTRAMUSCULAR; INTRAVENOUS at 12:32

## 2018-01-01 RX ADMIN — BUDESONIDE 500 MCG: 0.5 INHALANT RESPIRATORY (INHALATION) at 07:52

## 2018-01-01 RX ADMIN — Medication 10 ML: at 02:47

## 2018-01-01 RX ADMIN — FENTANYL CITRATE 50 MCG: 50 INJECTION, SOLUTION INTRAMUSCULAR; INTRAVENOUS at 13:47

## 2018-01-01 RX ADMIN — LIDOCAINE HYDROCHLORIDE 18 ML: 20 INJECTION, SOLUTION INFILTRATION; PERINEURAL at 13:46

## 2018-01-01 RX ADMIN — SODIUM CHLORIDE 100 ML/HR: 900 INJECTION, SOLUTION INTRAVENOUS at 15:24

## 2018-01-01 RX ADMIN — FAMOTIDINE 20 MG: 10 INJECTION, SOLUTION INTRAVENOUS at 03:19

## 2018-01-01 RX ADMIN — CEFTRIAXONE SODIUM 1 G: 1 INJECTION, POWDER, FOR SOLUTION INTRAMUSCULAR; INTRAVENOUS at 15:17

## 2018-01-01 RX ADMIN — DEXAMETHASONE 4 MG: 4 TABLET ORAL at 03:18

## 2018-01-01 RX ADMIN — BUDESONIDE 500 MCG: 0.5 INHALANT RESPIRATORY (INHALATION) at 20:07

## 2018-01-01 RX ADMIN — Medication 10 ML: at 15:13

## 2018-01-01 RX ADMIN — Medication 10 ML: at 23:03

## 2018-01-01 RX ADMIN — DEXAMETHASONE 4 MG: 4 TABLET ORAL at 16:38

## 2018-01-01 RX ADMIN — Medication 10 ML: at 17:10

## 2018-01-01 RX ADMIN — HYDROCODONE BITARTRATE AND ACETAMINOPHEN 1 TABLET: 5; 325 TABLET ORAL at 22:50

## 2018-01-01 RX ADMIN — MIDAZOLAM HYDROCHLORIDE 2 MG: 1 INJECTION, SOLUTION INTRAMUSCULAR; INTRAVENOUS at 13:06

## 2018-01-01 RX ADMIN — SODIUM CHLORIDE 75 ML/HR: 900 INJECTION, SOLUTION INTRAVENOUS at 02:32

## 2018-01-01 RX ADMIN — DEXAMETHASONE 4 MG: 4 TABLET ORAL at 09:05

## 2018-01-01 RX ADMIN — GADOTERATE MEGLUMINE 13 ML: 376.9 INJECTION INTRAVENOUS at 15:01

## 2018-01-01 RX ADMIN — GUAIFENESIN 600 MG: 600 TABLET, EXTENDED RELEASE ORAL at 17:19

## 2018-01-01 RX ADMIN — DIPHENHYDRAMINE HYDROCHLORIDE 25 MG: 50 INJECTION, SOLUTION INTRAMUSCULAR; INTRAVENOUS at 10:01

## 2018-01-01 RX ADMIN — PROMETHAZINE HYDROCHLORIDE 25 MG: 25 TABLET ORAL at 08:11

## 2018-01-01 RX ADMIN — DOCUSATE SODIUM 100 MG: 100 CAPSULE, LIQUID FILLED ORAL at 09:36

## 2018-01-01 RX ADMIN — Medication 10 ML: at 05:10

## 2018-01-01 RX ADMIN — LEVETIRACETAM 1000 MG: 100 INJECTION, SOLUTION, CONCENTRATE INTRAVENOUS at 20:40

## 2018-01-01 RX ADMIN — Medication 10 ML: at 06:46

## 2018-01-01 RX ADMIN — DEXAMETHASONE SODIUM PHOSPHATE 4 MG: 4 INJECTION, SOLUTION INTRAMUSCULAR; INTRAVENOUS at 02:37

## 2018-01-01 RX ADMIN — DEXAMETHASONE 4 MG: 4 TABLET ORAL at 21:28

## 2018-01-01 RX ADMIN — DEXAMETHASONE 4 MG: 4 TABLET ORAL at 00:19

## 2018-01-01 RX ADMIN — LEVETIRACETAM 500 MG: 100 INJECTION, SOLUTION, CONCENTRATE INTRAVENOUS at 14:23

## 2018-01-01 RX ADMIN — ROCURONIUM BROMIDE 5 MG: 10 INJECTION, SOLUTION INTRAVENOUS at 13:43

## 2018-01-01 RX ADMIN — LEVETIRACETAM 500 MG: 100 INJECTION, SOLUTION, CONCENTRATE INTRAVENOUS at 00:27

## 2018-01-01 RX ADMIN — DEXAMETHASONE SODIUM PHOSPHATE 10 MG: 4 INJECTION, SOLUTION INTRA-ARTICULAR; INTRALESIONAL; INTRAMUSCULAR; INTRAVENOUS; SOFT TISSUE at 13:52

## 2018-01-01 RX ADMIN — FENTANYL CITRATE 100 MCG: 50 INJECTION, SOLUTION INTRAMUSCULAR; INTRAVENOUS at 13:43

## 2018-01-01 RX ADMIN — ROCURONIUM BROMIDE 20 MG: 10 INJECTION, SOLUTION INTRAVENOUS at 15:17

## 2018-01-01 RX ADMIN — LORAZEPAM 1 MG: 1 TABLET ORAL at 19:28

## 2018-01-01 RX ADMIN — MONTELUKAST SODIUM 10 MG: 10 TABLET, COATED ORAL at 08:11

## 2018-01-01 RX ADMIN — SODIUM CHLORIDE AND POTASSIUM CHLORIDE: 4.5; 1.49 INJECTION, SOLUTION INTRAVENOUS at 16:50

## 2018-01-01 RX ADMIN — SODIUM CHLORIDE 100 ML: 900 INJECTION, SOLUTION INTRAVENOUS at 13:41

## 2018-01-01 RX ADMIN — ENOXAPARIN SODIUM 60 MG: 60 INJECTION SUBCUTANEOUS at 02:20

## 2018-01-01 RX ADMIN — Medication 2 G: at 13:19

## 2018-01-01 RX ADMIN — DOCUSATE SODIUM 100 MG: 100 CAPSULE, LIQUID FILLED ORAL at 08:57

## 2018-01-01 RX ADMIN — SODIUM CHLORIDE, SODIUM LACTATE, POTASSIUM CHLORIDE, AND CALCIUM CHLORIDE: 600; 310; 30; 20 INJECTION, SOLUTION INTRAVENOUS at 15:30

## 2018-01-01 RX ADMIN — METRONIDAZOLE 500 MG: 500 INJECTION, SOLUTION INTRAVENOUS at 04:30

## 2018-01-01 RX ADMIN — HYDROCODONE BITARTRATE AND ACETAMINOPHEN 1 TABLET: 5; 325 TABLET ORAL at 21:48

## 2018-01-01 RX ADMIN — ACETAMINOPHEN 650 MG: 325 TABLET, FILM COATED ORAL at 03:17

## 2018-01-01 RX ADMIN — DIPHENHYDRAMINE HYDROCHLORIDE 25 MG: 50 INJECTION, SOLUTION INTRAMUSCULAR; INTRAVENOUS at 15:55

## 2018-01-01 RX ADMIN — LEVETIRACETAM 500 MG: 100 INJECTION, SOLUTION, CONCENTRATE INTRAVENOUS at 12:13

## 2018-01-01 RX ADMIN — METRONIDAZOLE 500 MG: 500 INJECTION, SOLUTION INTRAVENOUS at 16:16

## 2018-01-01 RX ADMIN — ALPRAZOLAM 0.5 MG: 0.5 TABLET ORAL at 15:31

## 2018-01-01 RX ADMIN — CLONIDINE HYDROCHLORIDE 0.1 MG: 0.1 TABLET ORAL at 09:08

## 2018-01-01 RX ADMIN — Medication 10 ML: at 17:00

## 2018-01-01 RX ADMIN — Medication 1 CAPSULE: at 08:57

## 2018-01-01 RX ADMIN — IOHEXOL 50 ML: 240 INJECTION, SOLUTION INTRATHECAL; INTRAVASCULAR; INTRAVENOUS; ORAL at 13:41

## 2018-01-01 RX ADMIN — METRONIDAZOLE 500 MG: 500 INJECTION, SOLUTION INTRAVENOUS at 17:21

## 2018-01-01 RX ADMIN — ACETAMINOPHEN 650 MG: 325 TABLET ORAL at 08:11

## 2018-01-01 RX ADMIN — DEXAMETHASONE 4 MG: 4 TABLET ORAL at 08:11

## 2018-01-01 RX ADMIN — ENOXAPARIN SODIUM 60 MG: 40 INJECTION, SOLUTION INTRAVENOUS; SUBCUTANEOUS at 14:52

## 2018-01-01 RX ADMIN — Medication 10 ML: at 13:41

## 2018-01-01 RX ADMIN — FENTANYL CITRATE 100 MCG: 50 INJECTION, SOLUTION INTRAMUSCULAR; INTRAVENOUS at 14:15

## 2018-01-01 RX ADMIN — DEXAMETHASONE 4 MG: 4 TABLET ORAL at 22:39

## 2018-01-01 RX ADMIN — METHYLPREDNISOLONE SODIUM SUCCINATE 40 MG: 40 INJECTION, POWDER, FOR SOLUTION INTRAMUSCULAR; INTRAVENOUS at 18:02

## 2018-01-01 RX ADMIN — LEVETIRACETAM 1000 MG: 100 INJECTION, SOLUTION, CONCENTRATE INTRAVENOUS at 08:32

## 2018-01-01 RX ADMIN — PERFLUTREN 2 ML: 6.52 INJECTION, SUSPENSION INTRAVENOUS at 09:56

## 2018-01-01 RX ADMIN — DEXAMETHASONE 4 MG: 4 TABLET ORAL at 20:12

## 2018-01-01 RX ADMIN — Medication 10 ML: at 14:00

## 2018-01-01 RX ADMIN — DEXAMETHASONE 4 MG: 4 TABLET ORAL at 08:57

## 2018-01-01 RX ADMIN — LEVETIRACETAM 1000 MG: 100 INJECTION, SOLUTION, CONCENTRATE INTRAVENOUS at 09:09

## 2018-01-01 RX ADMIN — ACETAMINOPHEN 650 MG: 325 TABLET, FILM COATED ORAL at 09:09

## 2018-01-01 RX ADMIN — DOCUSATE SODIUM 100 MG: 100 CAPSULE, LIQUID FILLED ORAL at 09:08

## 2018-01-01 RX ADMIN — Medication 2 G: at 21:30

## 2018-01-01 RX ADMIN — IOPAMIDOL 80 ML: 755 INJECTION, SOLUTION INTRAVENOUS at 16:58

## 2018-01-01 RX ADMIN — SUCCINYLCHOLINE CHLORIDE 120 MG: 20 INJECTION INTRAMUSCULAR; INTRAVENOUS at 13:43

## 2018-01-01 RX ADMIN — DEXAMETHASONE 4 MG: 4 TABLET ORAL at 09:08

## 2018-01-01 RX ADMIN — DOCUSATE SODIUM 100 MG: 100 CAPSULE, LIQUID FILLED ORAL at 18:03

## 2018-01-01 RX ADMIN — DEXAMETHASONE SODIUM PHOSPHATE 10 MG: 10 INJECTION, SOLUTION INTRAMUSCULAR; INTRAVENOUS at 21:21

## 2018-01-01 RX ADMIN — DEXAMETHASONE 4 MG: 4 TABLET ORAL at 21:22

## 2018-01-01 RX ADMIN — CEFTRIAXONE SODIUM 2 G: 2 INJECTION, POWDER, FOR SOLUTION INTRAMUSCULAR; INTRAVENOUS at 14:37

## 2018-01-01 RX ADMIN — DOCUSATE SODIUM 100 MG: 100 CAPSULE, LIQUID FILLED ORAL at 16:21

## 2018-01-01 RX ADMIN — DEXAMETHASONE 4 MG: 4 TABLET ORAL at 23:30

## 2018-01-01 RX ADMIN — DEXAMETHASONE 4 MG: 4 TABLET ORAL at 18:09

## 2018-01-01 RX ADMIN — PANTOPRAZOLE SODIUM 40 MG: 40 TABLET, DELAYED RELEASE ORAL at 08:57

## 2018-01-01 RX ADMIN — FENTANYL CITRATE 25 MCG: 50 INJECTION, SOLUTION INTRAMUSCULAR; INTRAVENOUS at 17:00

## 2018-01-01 RX ADMIN — Medication 10 ML: at 21:38

## 2018-01-01 RX ADMIN — LEVETIRACETAM 1000 MG: 100 INJECTION, SOLUTION, CONCENTRATE INTRAVENOUS at 21:30

## 2018-01-01 RX ADMIN — ZOLPIDEM TARTRATE 5 MG: 5 TABLET ORAL at 21:28

## 2018-01-01 RX ADMIN — MONTELUKAST SODIUM 10 MG: 10 TABLET, COATED ORAL at 08:57

## 2018-01-01 RX ADMIN — DIPHENHYDRAMINE HYDROCHLORIDE 25 MG: 50 INJECTION, SOLUTION INTRAMUSCULAR; INTRAVENOUS at 12:31

## 2018-01-01 RX ADMIN — DEXAMETHASONE 4 MG: 4 TABLET ORAL at 09:36

## 2018-01-01 RX ADMIN — CEFTRIAXONE SODIUM 1 G: 1 INJECTION, POWDER, FOR SOLUTION INTRAMUSCULAR; INTRAVENOUS at 13:21

## 2018-01-01 RX ADMIN — METRONIDAZOLE 500 MG: 500 INJECTION, SOLUTION INTRAVENOUS at 04:49

## 2018-01-01 RX ADMIN — ROCURONIUM BROMIDE 20 MG: 10 INJECTION, SOLUTION INTRAVENOUS at 14:33

## 2018-01-01 RX ADMIN — LORAZEPAM 1 MG: 2 INJECTION INTRAMUSCULAR; INTRAVENOUS at 18:14

## 2018-01-01 RX ADMIN — Medication 10 ML: at 14:43

## 2018-01-01 RX ADMIN — DOCUSATE SODIUM 100 MG: 100 CAPSULE, LIQUID FILLED ORAL at 18:09

## 2018-01-01 RX ADMIN — ACETAMINOPHEN 650 MG: 325 TABLET, FILM COATED ORAL at 19:04

## 2018-02-06 PROBLEM — R51.9 NONINTRACTABLE EPISODIC HEADACHE: Status: ACTIVE | Noted: 2018-01-01

## 2018-02-06 NOTE — PROGRESS NOTES
HEME/ONC CONSULT       Chasity Armendariz is a 62 y.o. 1960 female and presents with Breast Cancer    CC  Breast cancer 2004    HPI: Ms. Cecil Ochoa is here today for follow-up for stage 4 breast cancer ER+ HER2 + not on any therapy. off kadcyla per pt preference since 101/7. Last seen here 10/17 per pt preference. CTs 8/17 showed good response to chemo. stable CTs in 11/17. Pt feels ok overall except left side wall pain/ chest wall pain/ tingling. C/o HAs x 2 that involved vision, [place] thing with lights on left. Pt has been wearing a retainer to straighten teeth. Pt got low dose naltrexone from dr Carline Monte. Has not started yet. Pt wants implants removed. Here with  today. DX   Encounter Diagnoses   Name Primary?  Malignant neoplasm of breast, stage 4, unspecified laterality ([address] Yes    Liver metastases ([address]     Bone metastases ([address]     Alternative medicine         STAGE: 4 liver and bones    TREATMENT COURSE: kadcyla started 6/17 and stopped 10/17.    4/2004, s/p L mastectomy with Dr. Issac Anderson at [facility]. Stage IIB, T2N1M0, 3/39 LN +, ER negative, DE +, HER 2 +, was enrolled on NSABP B-31 trial with adriamycin/cytoxan q 3 weeks x 4 followed by taxol weekly with herceptin x 12 then completion of a year of herceptin. States she did not receive XRT. Did receive 5 years of tamoxifen from [id number], which she tolerated well. In 2009 she noticed a chest wall mass. 11/23/10 FNA of chest wall mass showed ER + > 90%, DE + > 90%, MIB 50%, HER 2 + by FISH ratio 6.29. S/p BSO 12/27/10. Received zometa from 2010 to 2011. In 2011, she was started on q 3 week trastuzumab and exemestane, but states she took the exemestane sparingly due to joint pains and body aches. Has not taken any AI since 2013. Briefly took anastrozole in 5/2014. Stopped anastrozole 1/1/15 due to continued body and joint aches.   Started Herceptin 1/9/17, stopped 4/25/17 due to progressive disease noted on scans  Started Kadcyla 3.6 mg/kg every 21 days on 6/1/17, reduced to 3 mg/kg due to side effects on 6/26/17      No past medical history on file. Past Surgical History:   Procedure Laterality Date    BREAST SURGERY PROCEDURE UNLISTED  2004    mastectomy    HX GYN  2010    ovaries removed     Social History     Social History    Marital status:      Spouse name: N/A    Number of children: N/A    Years of education: N/A     Social History Main Topics    Smoking status: Never Smoker    Smokeless tobacco: Never Used    Alcohol use Yes      Comment: 3-4 drinks/month    Drug use: No    Sexual activity: No     Other Topics Concern    None     Social History Narrative     Family History   Problem Relation Age of Onset    Hypertension Mother     Cancer Father      mesothelioma    Cancer Maternal Grandfather 76     Bladder       Current Outpatient Prescriptions   Medication Sig Dispense Refill    OTHER Green foods complex. 2 tabs daily      OTHER CBD oil, take 40 mg at bedtime      OTHER Indications: Organic Sulfa- Powder mix with liquids  , 4x per day      ascorbic acid, vitamin C, (VITAMIN C) 250 mg tablet Take 250 mg by mouth daily.  IODINE PO Take  by mouth.  BETA 1,3 GLUCAN, BULK, 3 Tabs by Does Not Apply route daily.  OTHER,NON-FORMULARY, Mushroom extract twice daily; Crocifurous extract      TURMERIC, BULK, by Does Not Apply route daily.  cholecalciferol (VITAMIN D3) 1,000 unit tablet Take 4,000 Units by mouth daily.  multivitamin (ONE A DAY) tablet Take 1 tablet by mouth daily.  coenzyme q10 (CO Q-10) 10 mg cap Take  by mouth daily.  LACTOBAC CMB #3/FOS/PANTETHINE (PROBIOTIC & ACIDOPHILUS PO) Take  by mouth daily.  magnesium 250 mg tab Take 500 mg by mouth daily.  MILK THISTLE, BULK, by Does Not Apply route.          Allergies   Allergen Reactions    Pcn [Penicillins] Anaphylaxis    Contrast Agent [Iodine] Shortness of Breath and Itching     Patient reported during phone conversation on 4/18/17. Review of Systems    A comprehensive review of systems was negative except for: per HPI     Objective:  Visit Vitals    BP 98/63    Pulse 100    Temp 98.2 °F (36.8 °C) (Oral)    Resp 16    Ht 5' 6\" (1.676 m)    Wt 148 lb (67.1 kg)    SpO2 98%    BMI 23.89 kg/m2     Physical Exam:   General appearance - alert, well appearing, and in no distress, oriented to person, place, and time and normal appearing weight  Mental status - alert, oriented to person, place, and time, normal mood, behavior, speech, dress, motor activity, and thought processes  EYE-conj clear  Mouth - mucous membranes pink, moist, intact  Neck - supple, no adenopathy appreciated  Chest - clear to auscultation bilaterally  Heart - normal rate and regular rhythm   Abdomen - round, soft, non-tender  Ext-no pedal edema noted  Skin-Warm and dry. Intact without rash. Neuro -alert, oriented, normal speech, no focal findings or movement disorder noted    Diagnostic Imaging   reviewed    Results for orders placed during the hospital encounter of 11/10/17   CT ABD PELV WO CONT    Narrative Clinical history: Metastatic breast cancer follow-up    INDICATION: Metastatic breast cancer follow-up    COMPARISON: 8/20/2017    CONTRAST: None. TECHNIQUE:  5 mm axial images were obtained through the chest, abdomen and  pelvis. Coronal and sagittal reconstructions were generated. CT dose reduction  was achieved through use of a standardized protocol tailored for this  examination and automatic exposure control for dose modulation. The absence of intravenous contrast reduces the sensitivity for evaluation of  the mediastinum and upper abdominal organs. FINDINGS:    LUNGS:     Stable small nodular/groundglass opacity in the left lower lobe measuring 5 mm  (series 3, image 39). Stable minimal nodular/groundglass opacity in the anterior  right middle lobe (series 3, image 26).  Medial right lower lobe atelectasis  and/or scarring. No other significant lung abnormality. No new lung lesion. The  central airways are patent      LYMPH NODES: No significant change in size of prominent to borderline enlarged  numerous mediastinal and bilateral hilar lymph nodes. Limited evaluation of the  hilar lymph nodes without intravenous contrast administration. No evidence for  new thoracic lymphadenopathy. Sequelae of left axillary lymph node dissection,  unchanged. PLEURAL FLUID: No pleural effusion. PERICARDIAL FLUID: No pericardial effusion. THYROID: No thyroid nodule. OTHER: Right chest port extends to the atriocaval junction. Left breast  prosthesis. LIVER:   Absence of IV contrast limits the hepatic parenchymal evaluation. Ill-defined low density liver metastases are again seen and appear stable or  smaller in size. A right hepatic lobe lesion measures 7 mm (series 2, image 56). Left hepatic lobe lesion noted on the prior examination 2-58 is not visualized. GALLBLADDER: No calcified gallstone  SPLEEN: Unremarkable  PANCREAS: No mass or ductal dilatation. ADRENALS: Unremarkable. KIDNEYS/URETERS: Punctate nonobstructing left renal calculus. No focal renal  abnormality or hydronephrosis. PERITONEUM: No ascites. No abdominal lymphadenopathy by CT criteria. Prominent  and borderline enlarged periaortic hypermetabolic retroperitoneal lymph nodes  are not significantly changed. COLON: No dilatation or wall thickening. APPENDIX: Not clearly seen  SMALL BOWEL: No dilatation or wall thickening. Fecal stasis. STOMACH: Unremarkable. Pelvis:      PELVIS: No pelvic lymphadenopathy or free fluid. BONES: Diffuse sclerotic osseous metastases are seen, mildly progressed since  prior exam.. Unchanged pathologic vertebral body compression deformity involving  T9.  ADDITIONAL COMMENTS: N/A      Impression IMPRESSION:    Overall findings consistent with stable examination.     Minimal interval increase in osseous metastatic disease. Allowing for limitations due to lack of IV contrast enhancement likely slightly  diminished hepatic metastatic foci. Lab Results  reviewed  Lab Results   Component Value Date/Time    WBC 4.6 10/12/2017 01:34 PM    HGB 12.2 10/12/2017 01:34 PM    HCT 36.2 10/12/2017 01:34 PM    PLATELET 550 86/53/2514 01:34 PM    MCV 92.6 10/12/2017 01:34 PM     Lab Results   Component Value Date/Time    Sodium 142 10/12/2017 01:34 PM    Potassium 3.8 10/12/2017 01:34 PM    Chloride 105 10/12/2017 01:34 PM    CO2 28 10/12/2017 01:34 PM    Anion gap 9 10/12/2017 01:34 PM    Glucose 108 10/12/2017 01:34 PM    BUN 14 10/12/2017 01:34 PM    Creatinine 0.62 10/12/2017 01:34 PM    BUN/Creatinine ratio 23 10/12/2017 01:34 PM    GFR est AA >60 10/12/2017 01:34 PM    GFR est non-AA >60 10/12/2017 01:34 PM    Calcium 8.9 10/12/2017 01:34 PM    AST (SGOT) 99 10/12/2017 01:34 PM    Alk. phosphatase 213 10/12/2017 01:34 PM    Protein, total 7.7 10/12/2017 01:34 PM    Albumin 3.6 10/12/2017 01:34 PM    Globulin 4.1 10/12/2017 01:34 PM    A-G Ratio 0.9 10/12/2017 01:34 PM    ALT (SGPT) 120 10/12/2017 01:34 PM       Assessment/Plan:    1. Stage 4 breast cancer ER+ HER2 amplified at recurrence with mets to liver and bones in past.   Patient presented in 12/2016 with worsening back pain. MRI spine showed cord compression. CT's showed worsened bone and liver disease. She completed radiation to the spine. Pt preferred to do herceptin only and f/u PET on herceptin (in April 2017) looked worse with progressive disease in lungs/ liver/ bones. She did on Kadcyla 6/17 and stopped 10/17 due to pt preference. CT's 8/17 show good response to treatment with decreased liver leisons and stable bones. CT 11/17 stable. Pt has been off therapy since 10/17      Pt is feeling well overall except for left chest wall pain. 2. Right chest wall pain. ? Cause. .   s/p radiation to the spine for cord compression. Check CTs.      3. Headaches back x 2. Pt needs brain MRI and will order as pt is agreeable. 4.  Pt works in a school. Good support from  here today. Went integrative doctor dr Melissa Akers [place]. Call if questions. Follow-up in 4 weeks.

## 2018-02-06 NOTE — MR AVS SNAPSHOT
[address] [facility] 484 [address]12 [address] [place] [address] 
[phone] Patient: Maru Swann MRN: [id number] :1960 Visit Information Date & Time Provider Department Dept. Phone Encounter #  
 2018  3:30 PM [address] [facility] at [facility] [phone] Follow-up Instructions Return in about 4 weeks (around 3/6/2018). Follow-up and Disposition History Upcoming Health Maintenance Date Due Hepatitis C Screening 1960 Pneumococcal 19-64 Highest Risk (1 of 3 - PCV13) 1979 DTaP/Tdap/Td series (1 - Tdap) 1981 PAP AKA CERVICAL CYTOLOGY 1981 BREAST CANCER SCRN MAMMOGRAM 2010 FOBT Q 1 YEAR AGE 50-75 2010 Influenza Age 5 to Adult 2017 Allergies as of 2018  Review Complete On: 2018 By: Nikita Spain DO Severity Noted Reaction Type Reactions Pcn [Penicillins] High 2014    Anaphylaxis Contrast Agent [Iodine]  2017    Shortness of Breath, Itching Patient reported during phone conversation on 17. Current Immunizations  Reviewed on 2018 No immunizations on file. Reviewed by Jocy Dodd LPN on 3/5/6559 at  4:91 PM  
You Were Diagnosed With   
  
 Codes Comments Malignant neoplasm of breast, stage 4, unspecified laterality ([facility]    -  Primary ICD-10-CM: C50.919 ICD-9-CM: 174.9 Liver metastases ([facility]     ICD-10-CM: C78.7 ICD-9-CM: 197.7 Bone metastases ([facility]     ICD-10-CM: C79.51 
ICD-9-CM: 198.5 Alternative medicine     ICD-10-CM: Z51.89 ICD-9-CM: V58.9 Chronic left-sided thoracic back pain     ICD-10-CM: M54.6, G89.29 ICD-9-CM: 724.1, 338.29 Nonintractable episodic headache, unspecified headache type     ICD-10-CM: R51 ICD-9-CM: 217. 0 Vitals BP Pulse Temp Resp Height(growth percentile) Weight(growth percentile) 98/63 100 98.2 °F (36.8 °C) (Oral) 16 5' 6\" (1.676 m) 148 lb (67.1 kg) SpO2 BMI OB Status Smoking Status 98% 23.89 kg/m2 Medically Induced Never Smoker Vitals History BMI and BSA Data Body Mass Index Body Surface Area  
 23.89 kg/m 2 1.77 m 2 Preferred Pharmacy Pharmacy Name Phone [facility] [address] AT [address] [facility] [phone] Your Updated Medication List  
  
   
This list is accurate as of: 2/6/18  4:54 PM.  Always use your most recent med list.  
  
  
  
  
 BETA 1,3 GLUCAN (BULK) 3 Tabs by Does Not Apply route daily. cholecalciferol 1,000 unit tablet Commonly known as:  VITAMIN D3 Take 4,000 Units by mouth daily. CO Q-10 10 mg Cap Generic drug:  coenzyme q10 Take  by mouth daily. IODINE PO Take  by mouth.  
  
 magnesium 250 mg Tab Take 500 mg by mouth daily. MILK THISTLE (BULK)  
by Does Not Apply route. multivitamin tablet Commonly known as:  ONE A DAY Take 1 tablet by mouth daily. * OTHER Indications: Organic Sulfa- Powder mix with liquids  , 4x per day * OTHER  
CBD oil, take 40 mg at bedtime OTHER Green foods complex. 2 tabs daily OTHER(NON-FORMULARY) Mushroom extract twice daily; Crocifurous extract PROBIOTIC & ACIDOPHILUS PO Take  by mouth daily. TURMERIC (BULK)  
by Does Not Apply route daily. VITAMIN C 250 mg tablet Generic drug:  ascorbic acid (vitamin C) Take 250 mg by mouth daily. * Notice: This list has 2 medication(s) that are the same as other medications prescribed for you. Read the directions carefully, and ask your doctor or other care provider to review them with you. We Performed the Following CA 27.29 [62249 CPT(R)] CBC WITH AUTOMATED DIFF [09388 CPT(R)] METABOLIC PANEL, COMPREHENSIVE [09705 CPT(R)] Follow-up Instructions Return in about 4 weeks (around 3/6/2018). To-Do List   
 02/06/2018 Imaging:  CT ABD PELV W WO CONT   
  
 02/06/2018 Imaging:  CT CHEST W WO CONT   
  
 02/06/2018 Imaging:  MRI BRAIN W WO CONT Introducing [facility]! Dear Lashanda Cross: Thank you for requesting a [employer] account. Our records indicate that you already have an active [employer] account. You can access your account anytime at [email or web address] Did you know that you can access your hospital and ER discharge instructions at any time in [employer] You can also review all of your test results from your hospital stay or ER visit. Additional Information If you have questions, please visit the Frequently Asked Questions section of the [employer] website at [email or web address] Remember, [employer] is NOT to be used for urgent needs. For medical emergencies, dial 911. Now available from your iPhone and Android! Please provide this summary of care documentation to your next provider. Your primary care clinician is listed as [place] Clement. If you have any questions after today's visit, please call [phone].

## 2018-02-12 NOTE — TELEPHONE ENCOUNTER
Vmail:    Prabhjot Ojeda (pt) called in left vmail stated that she has brain MRI schedule WITH contrast. Pt is requesting to have this order changed to WITHOUT contrast. Please call pt back @ [phone]

## 2018-02-12 NOTE — TELEPHONE ENCOUNTER
Discussed with dr. El Weinstein. It is best to have MRI of the brain with contrast, performing test without contrast would not be as useful. Nursing, please let patient know.

## 2018-02-13 NOTE — TELEPHONE ENCOUNTER
HIPAA verified. Stated does not want exposure to contrast and it makes her feel \"bad\". Could not elaborated on sx. Advised of provider recommendation and stated does not want contrast.  Advised would forward to provider.

## 2018-02-16 PROBLEM — G93.89 BRAIN MASS: Status: ACTIVE | Noted: 2018-01-01

## 2018-02-16 NOTE — IP AVS SNAPSHOT
[address] [facility] [place] 
[phone] Patient: Rosalia Chaidez MRN: [id number] :1960 About your hospitalization You were admitted on:  2018 You last received care in the:  [facility] You were discharged on:  2018 Why you were hospitalized Your primary diagnosis was:  Brain Mass Follow-up Information Follow up With Details Comments Contact Info Sejal Acosta MD In 2 weeks  [address] [place] [address] 
[phone] Jean Lancaster MD In 1 week  935 [facility]. [place] 
[phone] Jamie Bowling DO In 2 weeks  200 [facility] [address] [place] 
[phone] 
  
 Kelly Matos MD  as instructed [address] [address] [facility] [address] [place] [address] [facility] [address] 
[phone] Your Scheduled Appointments   2:30 PM EST Follow Up with Jamie Bowling DO [facility] Oncology at [facility] [place]) 217 [facility] 209 [place]  
[phone] Discharge Orders None A check sherif indicates which time of day the medication should be taken. My Medications START taking these medications Instructions Each Dose to Equal  
 Morning Noon Evening Bedtime  
 dexamethasone 4 mg tablet Commonly known as:  DECADRON Your last dose was: Your next dose is: Take 1 Tab by mouth two (2) times daily (with meals) for 15 days. 4 mg  
    
   
   
   
  
 levETIRAcetam 500 mg tablet Commonly known as:  KEPPRA Your last dose was: Your next dose is: Take 2 Tabs by mouth two (2) times a day for 30 days. 1000 mg CONTINUE taking these medications Instructions Each Dose to Equal  
 Morning Noon Evening Bedtime BETA 1,3 GLUCAN (BULK) Your last dose was: Your next dose is:    
   
   
 3 Tabs by Does Not Apply route daily. 3 Tab  
    
   
   
   
  
 cholecalciferol 1,000 unit tablet Commonly known as:  VITAMIN D3 Your last dose was: Your next dose is: Take 4,000 Units by mouth daily. 4000 Units CO Q-10 10 mg Cap Generic drug:  coenzyme q10 Your last dose was: Your next dose is: Take  by mouth daily. IODINE PO Your last dose was: Your next dose is: Take  by mouth.  
     
   
   
   
  
 magnesium 250 mg Tab Your last dose was: Your next dose is: Take 500 mg by mouth daily. 500 mg MILK THISTLE (BULK) Your last dose was: Your next dose is:    
   
   
 by Does Not Apply route. multivitamin tablet Commonly known as:  ONE A DAY Your last dose was: Your next dose is: Take 1 tablet by mouth daily. 1 Tab  
    
   
   
   
  
 naltrexone 50 mg tablet Commonly known as:  DEPADE Your last dose was: Your next dose is: Take 4.5 mg by mouth daily. 4.5 mg  
    
   
   
   
  
 * OTHER Your last dose was: Your next dose is:    
   
   
 Indications: Organic Sulfa- Powder mix with liquids  , 4x per day * OTHER Your last dose was: Your next dose is: CBD oil, take 40 mg at bedtime OTHER Your last dose was: Your next dose is:    
   
   
 Green foods complex. 2 tabs daily OTHER(NON-FORMULARY) Your last dose was: Your next dose is: Mushroom extract twice daily; Crocifurous extract PROBIOTIC & ACIDOPHILUS PO Your last dose was: Your next dose is: Take  by mouth daily. TURMERIC (BULK) Your last dose was: Your next dose is:    
   
   
 by Does Not Apply route daily. VITAMIN C 250 mg tablet Generic drug:  ascorbic acid (vitamin C) Your last dose was: Your next dose is: Take 250 mg by mouth daily. 250 mg  
    
   
   
   
  
 * Notice: This list has 2 medication(s) that are the same as other medications prescribed for you. Read the directions carefully, and ask your doctor or other care provider to review them with you. Where to Get Your Medications Information on where to get these meds will be given to you by the nurse or doctor. ! Ask your nurse or doctor about these medications  
  dexamethasone 4 mg tablet  
 levETIRAcetam 500 mg tablet Discharge Instructions Discharge Instructions PATIENT ID: Colten Allen MRN: [id number] YOB: 1960 DATE OF ADMISSION: 2/16/2018  8:07 PM   
DATE OF DISCHARGE: 2/21/2018 PRIMARY CARE PROVIDER: Marizol Galicia MD  
 
ATTENDING PHYSICIAN: Alvin Rowe MD 
DISCHARGING PROVIDER: Alvin Rowe MD   
To contact this individual call [phone] and ask the  to page. If unavailable ask to be transferred the Adult Hospitalist Department. DISCHARGE DIAGNOSES Breast ca with brain mets CONSULTATIONS: IP CONSULT TO NEUROSURGERY 
IP CONSULT TO HEMATOLOGY PROCEDURES/SURGERIES: Procedure(s): LEFT TEMPORAL CRANIOTOMY FOR TUMOR REMOVAL USING BRAIN LAB GUIDANCE PENDING TEST RESULTS:  
At the time of discharge the following test results are still pending: FOLLOW UP APPOINTMENTS:  
Follow-up Information Follow up With Details Comments Contact Info Marizol Galicia MD In 2 weeks  [address] [address] 
[phone] Junaid Kirby MD In 1 week  [address] Greg Gaming [facility] 57 
[phone] Darline Hidalgo DO In 2 weeks  [address] [place] [address] MARGARITA Munguia Joey [address] [place] [address] 
[phone] 
  
 Pearl Gaming MD  as instructed [address] [address] 
[phone] ADDITIONAL CARE RECOMMENDATIONS:  
 
DIET: Regular Diet ACTIVITY: Activity as tolerated WOUND CARE:  
 
EQUIPMENT needed:  
 
 
DISCHARGE MEDICATIONS: 
 See Medication Reconciliation Form · It is important that you take the medication exactly as they are prescribed. · Keep your medication in the bottles provided by the pharmacist and keep a list of the medication names, dosages, and times to be taken in your wallet. · Do not take other medications without consulting your doctor. NOTIFY YOUR PHYSICIAN FOR ANY OF THE FOLLOWING:  
Fever over 101 degrees for 24 hours. Chest pain, shortness of breath, fever, chills, nausea, vomiting, diarrhea, change in mentation, falling, weakness, bleeding. Severe pain or pain not relieved by medications. Or, any other signs or symptoms that you may have questions about. DISPOSITION: 
x  Home With: 
 OT  PT  [facility]  RN  
  
 SNF/Inpatient Rehab/LTAC Independent/assisted living Hospice Other: CDMP Checked:  
Yes x PROBLEM LIST Updated: 
Yes x Signed:  
Tim Oreilly MD 
2/21/2018 10:17 AM 
 
  
  
  
[facility] Announcement We are excited to announce that we are making your provider's discharge notes available to you in [facility]. You will see these notes when they are completed and signed by the physician that discharged you from your recent hospital stay. If you have any questions or concerns about any information you see in [facility]t, please call the Health Information Department where you were seen or reach out to your Primary Care Provider for more information about your plan of care. Introducing [facility] & HEALTH SERVICES! Dear [address]: Thank you for requesting a [employer] account. Our records indicate that you already have an active [employer] account. You can access your account anytime at [email or web address] Did you know that you can access your hospital and ER discharge instructions at any time in [employer]? You can also review all of your test results from your hospital stay or ER visit. Additional Information If you have questions, please visit the Frequently Asked Questions section of the [employer] website at [email or web address] Remember, [employer] is NOT to be used for urgent needs. For medical emergencies, dial 911. Now available from your iPhone and Android! Providers Seen During Your Hospitalization Provider Specialty Primary office phone Jose A Tavarez. Sp Tuttle, [address] [place] [address] Emergency Medicine [phone] Yared Machado MD Internal Medicine [phone] Alis Elizabeth MD Internal Medicine [phone] Shelly Hogue MD Internal Medicine [phone] Your Primary Care Physician (PCP) Primary Care Physician Office Phone Office Fax Yolandetonypeng Isabel [phone] You are allergic to the following Allergen Reactions Pcn (Penicillins) Anaphylaxis Contrast Agent (Iodine) Shortness of Breath Itching Patient reported during phone conversation on 4/18/17. Recent Documentation Height Weight Breastfeeding? BMI OB Status Smoking Status 1.702 m 66.8 kg No 23.07 kg/m2 Medically Induced Never Smoker Emergency Contacts Name Discharge Info Relation Home Work Mobile Jean-Pierre Graham DISCHARGE CAREGIVER [3] Spouse [3] [phone] Patient Belongings The following personal items are in your possession at time of discharge: 
  Dental Appliances: None  Visual Aid: Glasses      Home Medications: None   Jewelry: None  Clothing: At bedside    Other Valuables: None Please provide this summary of care documentation to your next provider. Signatures-by signing, you are acknowledging that this After Visit Summary has been reviewed with you and you have received a copy. Patient Signature:  ____________________________________________________________ Date:  ____________________________________________________________  
  
[place] Favorite Provider Signature:  ____________________________________________________________ Date:  ____________________________________________________________

## 2018-02-16 NOTE — ED NOTES
6:45 PM  I have evaluated the patient as the Provider in Triage. I have reviewed Her vital signs and the triage nurse assessment. I have talked with the patient and any available family and advised that I am the provider in triage and have ordered the appropriate study to initiate their work up based on the clinical presentation during my assessment. I have advised that the patient will be accommodated in the Main ED as soon as possible. I have also requested to contact the triage nurse or myself immediately if the patient experiences any changes in their condition during this brief waiting period. Pt presents for abnormal MRI. Breast CA pt, had MRI for headache and vision changes showing new brain mass. Sent to ED.     JAZ Quiñonez  6:46 PM

## 2018-02-16 NOTE — TELEPHONE ENCOUNTER
Called patient to review brain MRI. MRI shows brain mass with edema. Per Dr. Karyle Keeler, patient should be advised to proceed to the ED. No answer on mobile phone, left voicemail to call back. Called home phone. Discussed brain MRI with patient and advised that she proceed to the ED. She reports she is home alone right now but will work on getting a ride to the ED. She verbalizes understanding and report she will \"see what she can do. \"

## 2018-02-17 NOTE — ED NOTES
Bedside and Verbal shift change report given to Danna Martinez 86 (oncoming nurse) by Danisha Avila RN (offgoing nurse). Report included the following information SBAR, ED Summary, MAR and Recent Results.

## 2018-02-17 NOTE — PROGRESS NOTES
Full note dictated  Needs contrast scan but small superficial lesion in left temporal lobe with surrounding edema should be removed for best survival chances. Follow up with focused rad tx post op, have discussed with Dr Bowen Nair, my partner in our group regarding that and also Dr Maira Lara, dr Shashank Jones partner.  Will lower decadron dose, start Keppra foe seizure prophylaxis and put on OR schedule early this week

## 2018-02-17 NOTE — PROGRESS NOTES
Pt in ER for new brain met/ breast cancer  MRI was noncontrast due to pt preference  Pt needs contrast MRI and steroids  Getting neurosurgery eval and admission

## 2018-02-17 NOTE — ED NOTES
Pt requesting something to help with sleep. Advised patient that it was too late in the morning to take anything that would make her drowsy, as she would need to be evaluated by Neuro/Neurosurgery later this morning. Pt verbalizes understanding. VSS.  Remains without any other complaints

## 2018-02-17 NOTE — ED PROVIDER NOTES
HPI Comments: 62 y.o. female with past medical history significant for breast cancer and metastatic cancer who presents to the ED with chief complaint of abnormal MRI. Patient reports undergoing chemotherapy and radiation for breast cancer with metastasis to the bones; reports her last treatment was in November of 2017. Patient reports her oncologist is Dr. Sofia Sanchez DO. Patient reports undergoing a non-contrast MRI recently for headaches with \"light flashes\" once a week for ~3-4 weeks. Patient reports receiving a call from Dr. Joan Reillypiter office \"earlier today\" stating the MRI showed a brain mass with swelling and referring her to the ED for further evaluation. Patient reports a history of receiving chemotherapy and radiation for cancer \"on and off since 2010. \" Patient denies dysuria and frequency. There are no other acute medical concerns at this time. PCP: Rojelio Jacob MD    Note written by Nain Nguyen, as dictated by Emily Wilson. Cherise Voss MD 8:42 PM     The history is provided by the patient. Past Medical History:   Diagnosis Date    Breast CA ([facility] [address]     Metastatic cancer [facility])        Past Surgical History:   Procedure Laterality Date    BREAST SURGERY PROCEDURE UNLISTED  2004    mastectomy    HX GYN  2010    ovaries removed         Family History:   Problem Relation Age of Onset    Hypertension Mother     Cancer Father      mesothelioma    Cancer Maternal Grandfather 76     Bladder       Social History     Social History    Marital status:      Spouse name: N/A    Number of children: N/A    Years of education: N/A     Occupational History    Not on file.      Social History Main Topics    Smoking status: Never Smoker    Smokeless tobacco: Never Used    Alcohol use Yes      Comment: 3-4 drinks/month    Drug use: No    Sexual activity: No     Other Topics Concern    Not on file     Social History Narrative         ALLERGIES: Pcn [penicillins] and Contrast agent [iodine]    Review of Systems   Constitutional: Negative for chills and fever. HENT: Negative for ear pain and sore throat. Eyes: Negative for pain. Respiratory: Negative for chest tightness and shortness of breath. Cardiovascular: Negative for chest pain and leg swelling. Gastrointestinal: Negative for abdominal pain, nausea and vomiting. Genitourinary: Negative for dysuria, flank pain and frequency. Musculoskeletal: Negative for back pain. Skin: Negative for rash. Neurological: Positive for headaches. All other systems reviewed and are negative. Vitals:    02/16/18 1851   BP: 102/69   Pulse: 88   Resp: 18   Temp: 98.3 °F (36.8 °C)   SpO2: 100%   Weight: 64.4 kg (142 lb)   Height: 5' 7\" (1.702 m)            Physical Exam   Constitutional: She appears well-developed and well-nourished. HENT:   Head: Normocephalic and atraumatic. Mouth/Throat: Oropharynx is clear and moist.   Eyes: Conjunctivae and EOM are normal. Pupils are equal, round, and reactive to light. No scleral icterus. Neck: Neck supple. No tracheal deviation present. Cardiovascular: Normal rate, regular rhythm, normal heart sounds and intact distal pulses. Pulmonary/Chest: Effort normal and breath sounds normal. No respiratory distress. Abdominal: Soft. She exhibits no distension. There is no tenderness. There is no rebound and no guarding. Genitourinary:   Genitourinary Comments: deferred   Musculoskeletal: She exhibits no edema. Neurological: She is alert. Alert and Oriented x3, Cranial nerves 2-12 intact, normal motor and sensation, negative Romberg, no pronator drift, normal finger to nose. Skin: Skin is warm and dry. Psychiatric: She has a normal mood and affect. Nursing note and vitals reviewed. Note written by Nain Giordano, as dictated by Schuyler Jim. Greg Browning MD 8:58 PM      [facility]      ED Course       Procedures    CONSULT NOTE:  8:55 PM Schuyler Jim.  Greg Browning MD spoke with  Jayy Rao MD, Consult for Neurosurgery. Discussed available diagnostic tests and clinical findings. Provider is in agreement with care plans as outlined. Dr. Jayy Rao MD recommends admitting the patient to the hospital and starting her on steroids; he will see the patient tomorrow. CONSULT NOTE:  9:25 PM Obinna Messer MD spoke with Dr. Sarwat Webster for Hospitalist. Discussed available diagnostic tests and clinical findings. Provider is in agreement with care plans as outlined. Dr. Ariela Jolley will see and admit the patient. LABORATORY TESTS:  Labs Reviewed   METABOLIC PANEL, BASIC - Abnormal; Notable for the following:        Result Value    BUN/Creatinine ratio 26 (*)     All other components within normal limits   CBC WITH AUTOMATED DIFF   SAMPLES BEING HELD       IMAGING RESULTS:  Mri Brain Wo Cont    Result Date: 2/16/2018  Indication: Breast cancer, metastatic. Sagittal axial and coronal MRI of the brain performed without IV gadolinium as patient refused the contrast. FINDINGS: There is a 1.3 cm mass in the left posterior temporal lobe with some surrounding vasogenic edema but no significant mass effect on the surrounding structures. There is no shift of midline. There is no intracranial hemorrhage. There are no other masses identified. IMPRESSION: 1.3 cm left posterior temporal lobe mass with surrounding vasogenic edema likely represents a metastases. No other lesions are identified. If patient is willing, the patient could return for contrast-enhanced sequences. MEDICATIONS GIVEN:  Medications   acetaminophen (TYLENOL) tablet 650 mg (not administered)   dexamethasone (PF) (DECADRON) injection 10 mg (10 mg IntraVENous Given 2/16/18 2121)       IMPRESSION:  1.  Mass of left temporal lobe        PLAN:  - NSG consult      Disposition:  admitted to Hospitalist    Condition:  stable    Total critical care time spent exclusive of procedures:  0 minutes    Addi Bacon MD

## 2018-02-17 NOTE — CONSULTS
[address] [facility]  MR#: [id number]  : 1960  ACCOUNT #: [de-identified]   DATE OF SERVICE: 2018    REQUESTING PHYSICIAN:  Loni Leon MD.     CHIEF COMPLAINT:  New diagnosis of left metastatic temporal lobe tumor. HISTORY OF PRESENT ILLNESS:  The patient has a history of breast cancer since . She has undergone mastectomy and chemo and radiation therapy, was noted to have metastatic disease to the bone several years later, she was treated with this, has been recently receiving chemotherapy by Dr. Pawan Dumas in Hematology/Oncology here at [facility]. She states that a couple of months ago she began noticing what she describes as floaters in her visual field in either the left or the right visual field. No headache, no nausea, no vomiting. When she described this as being persistent, her oncologist sent for an MRI yesterday, the results of which revealed although it had no contrast on the imaging studies a lesion in the superficial left temporal posterior lobe with surrounding edema. She was referred to the emergency room and I am consulted for neurosurgical evaluation. She has had no seizures. She has been placed on Decadron. She does use a fair amount of alternative dietary treatment as well as medical marijuana. She has a very good understanding of the disease process and seems quite well educated about it. SOCIAL HISTORY:  No history of alcohol or tobacco use. PAST SURGICAL HISTORY:  Only surgery was a left breast mastectomy and an oophorectomy on the left side. REVIEW OF SYSTEMS:  Otherwise, noncontributory. No other GI, , respiratory, cardiac, ENT, psychiatric or neurologic complaints. PHYSICAL EXAMINATION:   GENERAL:  According to the medical record notes that the patient appears ill. In my opinion, she looks rather well and feels well, although she has been started on steroids last night.   She is awake and alert, oriented to person, place and time. She is conversant. Speech seems fluent without any deficit. Face is symmetric. Palate rises equally. Tongue protrudes midline. Pupils are equal and reactive. Visual fields are full to confrontation grossly. She has no focal motor or sensory deficits. Toes are downgoing. No clonus at the ankles. Gait and station are deferred. IMAGING STUDIES:  Include an MRI of the brain without contrast that shows a round lesion approximately 1.5 cm in diameter superficial in the left posterior temporal lobe with a fair amount of surrounding edema. PLAN:  I had a long discussion with her, her  and her sister-in-law with regard to treatment. It is my opinion that since it is so superficial and easily resectable, I recommended surgical resection followed by focused beam radiation instead of focus beam radiation at the outset. She is very anxious to have it removed. I did describe risks, benefits and alternatives with her explaining that there could be speech and/or visual deficits, but given its small size, I believe that it can be removed with limited risk. She wishes to proceed. We will leave her on the steroids, although we can lower the dose. I will put her on seizure prophylaxis medication. She will be admitted to the hospitalist, and I will follow with you and put her on the operating schedule early this week. Thank you for asking me to see the patient.       MD PEYTON Hudson / MELINDA  D: 02/17/2018 11:20     T: 02/17/2018 13:58  JOB #: [id number]

## 2018-02-17 NOTE — ED NOTES
Pt resting in bed comfortably. Family at the bedside. Call bell within reach. Pt voices no concern at this time. VSS. NAD. Will continue to monitor.

## 2018-02-17 NOTE — CONSULTS
[id number] [facility] at [facility]  [phone]    Hematology / Oncology Consult    Reason for Visit:   Daren Hayes is a 62 y.o. female who is seen in consultation at the request of Dr. Jermain Ruff for evaluation of brain mass in setting of metastatic breast cancer. Hematology Oncology Diagnosis and Treatment History:   Diagnosis: Metastatic breast cancer to liver and bone, now brain    TREATMENT COURSE: John Caballero started 6/17 and stopped 10/17.    4/2004, s/p L mastectomy with Dr. Hosea Olvera at [facility]. Stage IIB, T2N1M0, 3/39 LN +, ER negative, AR +, HER 2 +, was enrolled on NSABP B-31 trial with adriamycin/cytoxan q 3 weeks x 4 followed by taxol weekly with herceptin x 12 then completion of a year of herceptin. States she did not receive XRT. Did receive 5 years of tamoxifen from 5374-0268, which she tolerated well. In 2009 she noticed a chest wall mass. 11/23/10 FNA of chest wall mass showed ER + > 90%, AR + > 90%, MIB 50%, HER 2 + by FISH ratio 6.29. S/p BSO 12/27/10. Received zometa from 2010 to 2011. In 2011, she was started on q 3 week trastuzumab and exemestane, but states she took the exemestane sparingly due to joint pains and body aches. Has not taken any AI since 2013. Briefly took anastrozole in 5/2014. Stopped anastrozole 1/1/15 due to continued body and joint aches. Started Herceptin 1/9/17, stopped 4/25/17 due to progressive disease noted on scans  Started Kadcyla 3.6 mg/kg every 21 days on 6/1/17, reduced to 3 mg/kg due to side effects on 6/26/17    History of Present Illness:   Ms. Sari Bolanos is a 61 y/o female with metastatic breast cancer followed by Dr. Ofelia Ray admitted with a new left temporal lobe brain mass. Patient was recently seen by Dr. Ofelia Ray in [facility] on 2/8/18 with plan for restaging scans and brain MRI given left chest pain/paresthesias and vision changes.  However, the patient had been putting off imaging due to cost. Patient has been off of treatment for her metastatic breast cancer since 10/2017 per patient preference. Prior scans in 8/2017 showed good response to treatment and scan sin 11/2017 showed stable disease. After the patient's brain MRI w/o contrast revealed new brain mass, patient was referred to the ER. She was started on IV Dexamethasone. She denies any current headaches. She states that her vision does seem improved after starting the steroids. She denies any weakness in her arms/legs, gait abnormalities, confusion, n/v/d. No fevers. She describes a left-sided chest discomfort characterized by tingling which has been present for the last several weeks to months. She states she is also concerned because her tumor markers have increased. She has been evaluated by Neurosurgery who recommends repeating MRI, this time with IV contrast, for better characterization and to rule out any smaller lesions. He also recommends surgical resection of this tumor and decrease in dose of Dexamethasone.      Past Medical History:   Diagnosis Date    Breast CA ([address]     Metastatic cancer [facility])       Past Surgical History:   Procedure Laterality Date    BREAST SURGERY PROCEDURE UNLISTED  2004    mastectomy    HX GYN  2010    ovaries removed      Social History   Substance Use Topics    Smoking status: Never Smoker    Smokeless tobacco: Never Used    Alcohol use Yes      Comment: 3-4 drinks/month      Family History   Problem Relation Age of Onset    Hypertension Mother     Cancer Father      mesothelioma    Cancer Maternal Grandfather 76     Bladder     Current Facility-Administered Medications   Medication Dose Route Frequency    sodium chloride (NS) flush 5-10 mL  5-10 mL IntraVENous Q8H    sodium chloride (NS) flush 5-10 mL  5-10 mL IntraVENous PRN    0.9% sodium chloride infusion  75 mL/hr IntraVENous CONTINUOUS    acetaminophen (TYLENOL) tablet 650 mg  650 mg Oral Q4H PRN    HYDROcodone-acetaminophen (NORCO) 5-325 mg per tablet 1 Tab  1 Tab Oral Q4H PRN  ondansetron (ZOFRAN) injection 4 mg  4 mg IntraVENous Q4H PRN    docusate sodium (COLACE) capsule 100 mg  100 mg Oral BID    famotidine (PF) (PEPCID) 20 mg in sodium chloride 10 mL injection  20 mg IntraVENous Q12H    dexamethasone (DECADRON) tablet 4 mg  4 mg Oral Q6H    levETIRAcetam (KEPPRA) 500 mg in 0.9% sodium chloride 100 mL IVPB  500 mg IntraVENous Q12H    [START ON 2/18/2018] cholecalciferol (VITAMIN D3) tablet 4,000 Units  4,000 Units Oral DAILY    [START ON 2/18/2018] ascorbic acid (vitamin C) (VITAMIN C) tablet 250 mg  250 mg Oral DAILY    therapeutic multivitamin (THERAGRAN) tablet 1 Tab  1 Tab Oral DAILY    acetaminophen (TYLENOL) tablet 650 mg  650 mg Oral Q4H PRN      Allergies   Allergen Reactions    Pcn [Penicillins] Anaphylaxis    Contrast Agent [Iodine] Shortness of Breath and Itching     Patient reported during phone conversation on 4/18/17. Review of Systems: A complete review of systems was obtained, negative except as described above. Physical Exam:     Visit Vitals    BP 95/56 (BP 1 Location: Right arm, BP Patient Position: At rest)    Pulse 78    Temp 97.9 °F (36.6 °C)    Resp 18    Ht 5' 7\" (1.702 m)    Wt 142 lb (64.4 kg)    SpO2 100%    Breastfeeding No    BMI 22.24 kg/m2     ECOG PS: 0  General: No distress  Eyes: PERRLA, anicteric sclerae  HENT: Atraumatic with normal appearance of ears and nose; OP clear  Neck: Supple; no thyromegaly   Lymphatic: No cervical, supraclavicular, axillary adenopathy  Respiratory: CTAB, normal respiratory effort  CV: Normal rate, regular rhythm, no murmurs, no peripheral edema  GI: Soft, nontender, nondistended, no masses, no hepatomegaly, no splenomegaly  MS: Normal gait and station. Digits without clubbing or cyanosis. Skin: No rashes, ecchymoses, or petechiae. Normal temperature, turgor, and texture.   Neuro/Psych: Alert, oriented, appropriate affect, normal judgment/insight      Results:     Lab Results   Component Value Date/Time    WBC 5.3 02/16/2018 09:11 PM    HGB 12.5 02/16/2018 09:11 PM    HCT 36.2 02/16/2018 09:11 PM    PLATELET 877 50/89/1961 09:11 PM    MCV 93.8 02/16/2018 09:11 PM    ABS. NEUTROPHILS 3.2 02/16/2018 09:11 PM     Lab Results   Component Value Date/Time    Sodium 142 02/16/2018 09:11 PM    Potassium 4.5 02/16/2018 09:11 PM    Chloride 107 02/16/2018 09:11 PM    CO2 29 02/16/2018 09:11 PM    Glucose 86 02/16/2018 09:11 PM    BUN 16 02/16/2018 09:11 PM    Creatinine 0.61 02/16/2018 09:11 PM    GFR est AA >60 02/16/2018 09:11 PM    GFR est non-AA >60 02/16/2018 09:11 PM    Calcium 9.1 02/16/2018 09:11 PM     Lab Results   Component Value Date/Time    Bilirubin, total 0.2 02/09/2018 01:50 PM    ALT (SGPT) 55 (H) 02/09/2018 01:50 PM    AST (SGOT) 70 (H) 02/09/2018 01:50 PM    Alk. phosphatase 155 (H) 02/09/2018 01:50 PM    Protein, total 7.3 02/09/2018 01:50 PM    Albumin 4.7 02/09/2018 01:50 PM    Globulin 4.1 (H) 10/12/2017 01:34 PM     Brain MRI without contrast 2/16/18: IMPRESSION: 1.3 cm left posterior temporal lobe mass with surrounding vasogenic  edema likely represents a metastases. No other lesions are identified. If  patient is willing, the patient could return for contrast-enhanced sequences. Assessment and Recommendations:   Maru Swann is a 62 y.o. female with metastatic breast cancer admitted with new brain mass. 1. Metastatic breast cancer: Was originally Stage IIB, hormone receptor positive, Her2 negative at diagnosis in 2004 and treated with left mastectomy and adjuvant chemotherapy, no radiation. She was treated with adjuvant endocrine therapy, but was diagnosed recurrence in Nov 2010, now with Her2 positive and ER/ID positive disease. Has been off of treatment with Kadcyla since Oct 2017 per patient request. Patient is followed by Dr. Jazmyn Watts and needs new restaging scans.  After management of brain metastasis with surgical resection and possible radiation, patient will likely need to restart on systemic treatment. However, we do need restaging scans to determine status of disease prior to therapy. -- Please perform chest/abd/pelvis CT to evaluate extent of disease while in hospital  -- Please do not hesitate to call with questions/concerns    2. Left temporal brain mass: Most likely 2/2 metastatic breast cancer. Has been evaluated by NSG who recommend surgical resection after checking brain MRI with contrast. Patient will likely also need adjuvant radiation, but will defer this evaluation for now. No significant neurological symptoms at this time. -- Continue steroids per NSG recommendations  -- Repeat MRI with contrast per NSG protocol. NSG has ordered  -- Neurosurgery to consider resection of brain mass next week. 3. Transaminitis: Mild, but may be 2/2 liver mets. -- Will evaluate further on imaging.       Signed By: Marcel Banegas MD     February 17, 2018

## 2018-02-17 NOTE — PROGRESS NOTES
Hospitalist Progress Note  Zachary Blake MD  Answering service: [phone] from in house phone  Cell: [phone]      Date of Service:  2018  NAME:  Jessica Doe  :  1960  MRN:  [id number]      Admission Summary:   63 yo female with left Breast Ca with Mets to bone, admitted for headache. Pt completed Chemo on 2017. Pt states she was having intermittent HAs like migraines and also visual changes. Pt had MRI as outpatient which showed small left temporal lobe lesion with edema. Interval history / Subjective:     Pt seen and examined  Headache improved  No n/v, weakness, numbness, lightheadedness, dizziness     Assessment & Plan:     Left temporal lobe mass with brain swelling likely metastatic breast ca  - c/w Decadron  - Keppra for seizure prophylaxis  - Neurosurgery consulted, plan for OR early this week  - neuro checks  - no blood thinners    Left Breast Ca with mets to bone  - s/p completion of Chemo 2017  - oncology eval      Code status: FULL  DVT prophylaxis: SCDs    Care Plan discussed with: Patient/Family and Nurse  Disposition: Home w/Family     Hospital Problems  Date Reviewed: 2018          Codes Class Noted POA    * (Principal)Brain mass ICD-10-CM: G93.9  ICD-9-CM: 348.9  2018 Yes                Review of Systems:   A comprehensive review of systems was negative except for that written in the HPI. Vital Signs:    Last 24hrs VS reviewed since prior progress note.  Most recent are:  Visit Vitals    /67    Pulse 77    Temp 97.8 °F (36.6 °C)    Resp 22    Ht 5' 7\" (1.702 m)    Wt 64.4 kg (142 lb)    SpO2 98%    BMI 22.24 kg/m2       No intake or output data in the 24 hours ending 18 1208     Physical Examination:             Constitutional:  No acute distress, cooperative, pleasant    ENT:  Oral mucous moist   Resp:  CTA bilaterall   CV:  Regular rhythm, normal rat    GI: Soft, non distended, non tender. normoactive bowel sounds    Musculoskeletal:  No edema, warm, 2+ pulses throughout    Neurologic:  alert and awake, PERRLA, EOMI, motor 5/5, no drift, clear speech     Skin:  Good turgor, no rashes or ulcers       Data Review:    Review and/or order of clinical lab test  Review and/or order of tests in the radiology section of CPT  Review and/or order of tests in the medicine section of CPT      Labs:     Recent Labs      02/16/18 2111   WBC  5.3   HGB  12.5   HCT  36.2   PLT  288     Recent Labs      02/16/18 2111   NA  142   K  4.5   CL  107   CO2  29   BUN  16   CREA  0.61   GLU  86   CA  9.1   MG  2.5*   PHOS  3.8     No results for input(s): SGOT, GPT, ALT, AP, TBIL, TBILI, TP, ALB, GLOB, GGT, AML, LPSE in the last 72 hours. No lab exists for component: AMYP, HLPSE  No results for input(s): INR, PTP, APTT in the last 72 hours. No lab exists for component: INREXT   No results for input(s): FE, TIBC, PSAT, FERR in the last 72 hours. No results found for: FOL, RBCF   No results for input(s): PH, PCO2, PO2 in the last 72 hours. No results for input(s): CPK, CKNDX, TROIQ in the last 72 hours.     No lab exists for component: CPKMB  No results found for: CHOL, CHOLX, CHLST, CHOLV, HDL, LDL, LDLC, DLDLP, TGLX, TRIGL, TRIGP, CHHD, CHHDX  No results found for: GLUCPOC  No results found for: COLOR, APPRN, SPGRU, REFSG, TRI, PROTU, GLUCU, KETU, BILU, UROU, ZEENAT, LEUKU, GLUKE, EPSU, BACTU, WBCU, RBCU, CASTS, UCRY      Medications Reviewed:     Current Facility-Administered Medications   Medication Dose Route Frequency    sodium chloride (NS) flush 5-10 mL  5-10 mL IntraVENous Q8H    sodium chloride (NS) flush 5-10 mL  5-10 mL IntraVENous PRN    0.9% sodium chloride infusion  75 mL/hr IntraVENous CONTINUOUS    acetaminophen (TYLENOL) tablet 650 mg  650 mg Oral Q4H PRN    HYDROcodone-acetaminophen (NORCO) 5-325 mg per tablet 1 Tab  1 Tab Oral Q4H PRN    ondansetron (ZOFRAN) injection 4 mg  4 mg IntraVENous Q4H PRN    docusate sodium (COLACE) capsule 100 mg  100 mg Oral BID    famotidine (PF) (PEPCID) 20 mg in sodium chloride 10 mL injection  20 mg IntraVENous Q12H    dexamethasone (DECADRON) tablet 4 mg  4 mg Oral Q6H    levETIRAcetam (KEPPRA) 500 mg in 0.9% sodium chloride 100 mL IVPB  500 mg IntraVENous Q12H    acetaminophen (TYLENOL) tablet 650 mg  650 mg Oral Q4H PRN     Current Outpatient Prescriptions   Medication Sig    OTHER Green foods complex. 2 tabs daily    OTHER CBD oil, take 40 mg at bedtime    OTHER Indications: Organic Sulfa- Powder mix with liquids  , 4x per day    ascorbic acid, vitamin C, (VITAMIN C) 250 mg tablet Take 250 mg by mouth daily.  MILK THISTLE, BULK, by Does Not Apply route.  IODINE PO Take  by mouth.  BETA 1,3 GLUCAN, BULK, 3 Tabs by Does Not Apply route daily.  OTHER,NON-FORMULARY, Mushroom extract twice daily; Crocifurous extract    TURMERIC, BULK, by Does Not Apply route daily.  cholecalciferol (VITAMIN D3) 1,000 unit tablet Take 4,000 Units by mouth daily.  multivitamin (ONE A DAY) tablet Take 1 tablet by mouth daily.  coenzyme q10 (CO Q-10) 10 mg cap Take  by mouth daily.  LACTOBAC CMB #3/FOS/PANTETHINE (PROBIOTIC & ACIDOPHILUS PO) Take  by mouth daily.  magnesium 250 mg tab Take 500 mg by mouth daily.      ______________________________________________________________________  EXPECTED LENGTH OF STAY: - - -  ACTUAL LENGTH OF STAY:          1                 Cheyenne Hsu MD

## 2018-02-17 NOTE — H&P
[facility]  ACUTE CARE HISTORY AND PHYSICAL    Morro Jordan  MR#: [id number]  : 1960  ACCOUNT #: [de-identified]   DATE OF SERVICE: 2018    PRIMARY CARE PHYSICIAN:  Dr. Gerardo Akbar MD.      Caleen Kocher:  The patient and the patient's spouse was present at the bedside. CHIEF COMPLAINT:  Headache. HISTORY OF PRESENT ILLNESS:  This is a 59-year-old woman with a past medical history significant for left breast cancer with metastasis to the bone was in her usual state of times about a month ago when the patient started experiencing a headache. The headache is intermittent associated with blurring of vision located at the left side of the head about 4/10 in severity, not worse in any particular position. The associated blurring of vision is not specific. No history of fever, no rigors, no chills. Patient is under the care of Dr. Melody Hutchison for left breast cancer with metastasis to the bones. She completed chemotherapy in 2017. Because of headache, MRI of the brain was ordered. The patient received a phone call at home today advising her to go to the emergency room because the MRI of the brain shows a brain mass. Patient came to the emergency room because of that. When the patient arrived at the emergency rooms, she was evaluated by the emergency room physician. Neurosurgery consult carried out by the emergency room physician. Patient was subsequently referred to the hospitalist service for evaluation for admission. PAST MEDICAL HISTORY:  Left breast cancer with metastases. ALLERGIES:  PATIENT IS ALLERGIC TO PENICILLIN AND CONTRAST AGENT. MEDICATIONS:  Vitamin C 250 mg daily, multivitamin one tablet daily. FAMILY HISTORY:  This was reviewed. Mother had hypertension. Father had mesothelioma. PAST SURGICAL HISTORY:  Significant for left breast mastectomy and removal of an ovary.      SOCIAL HISTORY:  No history of alcohol or tobacco abuse.    REVIEW OF SYSTEMS:    HEAD, EYES, EARS, NOSE AND THROAT:  This is positive for headache, blurring of vision, no photophobia. RESPIRATORY:  No cough, no shortness of breath, no hemoptysis. CARDIOVASCULAR:  No chest pain, orthopnea. No palpitations. GASTROINTESTINAL:  No nausea and vomiting, no diarrhea, no constipation. GENITOURINARY:  No dysuria, no urgency, no frequency. All other systems are reviewed and they are negative. PHYSICAL EXAMINATION:  GENERAL:  Patient appeared ill, in moderate distress. VITAL SIGNS:  On arrival at the emergency room, temperature 98.3, pulse 88, respiratory rate 18, blood pressure 102/59, oxygen saturation 100% on room air. HEAD: Normocephalic, atraumatic. EYES: Normal eye movement, no redness, no drainage, no discharge. EARS:  Normal external ears with no obvious drainage. NOSE:  No deformity, no drainage. MOUTH AND THROAT:  No visible oral lesion. NECK:  Supple, no JVD, no thyromegaly. CHEST:  Clear breath sounds. No wheezing, no crackles. HEART:  Normal S1 and S2, regular. No clinically appreciable murmur. ABDOMEN:  Soft, nontender, normal bowel sounds. CENTRAL NERVOUS SYSTEM:  Alert, oriented x3. No gross focal neurological deficit. EXTREMITIES:  No edema. Pulses 2+ bilaterally. MUSCULOSKELETAL:  No obvious joint deformity and swelling. SKIN:  No acute skin lesions seen in this exposed part of the body. PSYCHIATRIC:  Normal mood and affect. LYMPHATIC SYSTEM:  No cervical lymphadenopathy. DIAGNOSTIC DATA:  MRI of the brain shows a 1.3 cm left posterior temporal lobe mass with surrounding discogenic edema, likely representing metastases. LABORATORY DATA:  Chemistry:  Sodium 142, potassium 4.5, chloride 107, CO2 27, glucose 86, BUN 15, creatinine 0.61, calcium 9.1. Hematology:  WBC 5.3, hemoglobin 12.5, hematocrit 36.2, platelet 294. ASSESSMENT:  1. Brain mass. 2.  Left breast cancer with metastasis. PLAN:  1.   Brain mass. We will admit the patient for further evaluation and treatment. Neurosurgery consult has been requested. We will await further recommendation from the neurosurgeon. Patient will be placed on Decadron while awaiting the further recommendation from the neurosurgeon. 2.  Left breast cancer with metastases. We will continue with supportive therapy. The patient's oncologist will be consulted. 3.  Other issues. CODE STATUS:  REMAINS A FULL CODE. We will request SCD for DVT prophylaxis.       Jose Pelletier MD       RE / MELINDA  D: 02/17/2018 00:54     T: 02/17/2018 08:20  JOB #: [id number]  CC: John López MD

## 2018-02-17 NOTE — ED NOTES
Pt placed in hospital bed. Female  provided with recliner. Pt placed on CM x 3. Call bell in reach. Pt denies any complaints at this time. Still awaiting complete admission orders from Dr. Edilia Goldberg.   VSS

## 2018-02-18 NOTE — PROGRESS NOTES
Spiritual Care Assessment/Progress Notes    Dean Alvarez [id number]  [id number]    1960  62 y.o.  female    Patient Telephone Number: [phone] (home)   Mandaeism Affiliation: [place]   Language: English   Extended Emergency Contact Information  Primary Emergency Contact: Jean-Pierre Graham   [address] Phone: [phone]  Mobile Phone: [phone]  Relation: Spouse   Patient Active Problem List    Diagnosis Date Noted    Brain mass 02/16/2018    Nonintractable episodic headache 02/06/2018    Chemotherapy follow-up examination 07/23/2017    Fatigue 06/19/2017    Anxiety about health 05/29/2017    Alternative medicine 05/29/2017    Left-sided thoracic back pain 11/28/2016    Bone [address] ([facility] [address] 11/28/2016    Liver metastases ([facility] [address] 11/28/2016    Breast cancer, stage 4 ([facility] [address] 10/20/2014        Date: 2/18/2018       Level of Mandaeism/Spiritual Activity:  [x]         Involved in tyler tradition/spiritual practice    []         Not involved in tyler tradition/spiritual practice  []         Spiritually oriented    []         Claims no spiritual orientation    []         seeking spiritual identity  []         Feels alienated from Synagogue practice/tradition  []         Feels angry about Synagogue practice/tradition  [x]         Spirituality/Synagogue tradition is a resource for coping at this time.   []         Not able to assess due to medical condition    Services Provided Today:  []         crisis intervention    []         reading Scriptures  [x]         spiritual assessment    []         prayer  []         empathic listening/emotional support  []         rites and rituals (cite in comments)  []         life review     []         Synagogue support  []         theological development   []         advocacy  []         ethical dialog     []         blessing  []         bereavement support    [x]         support to family  []         anticipatory grief support   []         help with AMD  []         spiritual guidance    []         meditation      Spiritual Care Needs  []         Emotional Support  [x]         Spiritual/Zoroastrian Care  []         Loss/Adjustment  []         Advocacy/Referral                /Ethics  []         No needs expressed at               this time  []         Other: (note in               comments)  Spiritual Care Plan  [x]         Follow up visits with               pt/family  []         Provide materials  []         Schedule sacraments  []         Contact Community               Clergy  []         Follow up as needed  []         Other: (note in               comments)     Comments: I spoke with Ms. Wesley Clark briefly because she had requested communion. She is not Lutheran, so I plan to provide her with non-Lutheran communion later this afternoon after she returns from a test.    Spiritual Care remains available in the meantime if needed. Rev. Mega Fernandes M.Div, [facility]

## 2018-02-18 NOTE — PROGRESS NOTES
Bedside shift change report given to Magdiel Silveira (oncoming nurse) by Macrina Siddiqi RN (offgoing nurse). Report given with SBAR, MAR, Recent Results, Vital Signs, and plan of care. Pt is alert and oriented x 4 . Call bell within reach of patient. Safety/fall precautions in place.

## 2018-02-18 NOTE — PROGRESS NOTES
Hospitalist Progress Note  Jose Seymour MD  Answering service: [phone] OR 36 from in house phone  Cell: [phone]      Date of Service:  2018  NAME:  Thierno Love  :  1960  MRN:  [id number]      Admission Summary:   61 yo female with left Breast Ca with Mets to bone, admitted for headache. Pt completed Chemo on 2017. Pt states she was having intermittent HAs like migraines and also visual changes. Pt had MRI as outpatient which showed small left temporal lobe lesion with edema. Interval history / Subjective:     Pt seen and examined  No HA or visual changes     Assessment & Plan:     Left temporal lobe mass with brain swelling likely metastatic breast ca  - c/w Decadron  - Keppra for seizure prophylaxis  - Neurosurgery consulted, plan for OR early this week followed by Focused radiation tx by Devin Ye as outpatient. - neuro checks  - no blood thinners  - MRI Brain with Cont     Left Breast Ca with mets to bone  - s/p completion of Chemo 2017  - oncology eval  - CT chest/abd/pel to evaluate extent of disease      Code status: FULL  DVT prophylaxis: SCDs    Care Plan discussed with: Patient/Family and Nurse  Disposition: Home w/Family     Hospital Problems  Date Reviewed: 2018          Codes Class Noted POA    * (Principal)Brain mass ICD-10-CM: G93.9  ICD-9-CM: 348.9  2018 Yes                Review of Systems:   A comprehensive review of systems was negative except for that written in the HPI. Vital Signs:    Last 24hrs VS reviewed since prior progress note.  Most recent are:  Visit Vitals    BP (!) 88/69    Pulse 69    Temp 97.7 °F (36.5 °C)    Resp 14    Ht 5' 7\" (1.702 m)    Wt 64.4 kg (142 lb)    SpO2 100%    Breastfeeding No    BMI 22.24 kg/m2       No intake or output data in the 24 hours ending 18 1256     Physical Examination:             Constitutional:  No acute distress, cooperative, pleasant    ENT:  Oral mucous moist   Resp:  CTA bilaterall   CV:  Regular rhythm, normal rat    GI:  Soft, non distended, non tender. normoactive bowel sounds    Musculoskeletal:  No edema, warm, 2+ pulses throughout    Neurologic:  alert and awake, PERRLA, EOMI, motor 5/5, no drift, clear speech     Skin:  Good turgor, no rashes or ulcers       Data Review:    Review and/or order of clinical lab test  Review and/or order of tests in the radiology section of CPT  Review and/or order of tests in the medicine section of CPT      Labs:     Recent Labs      02/18/18 0342 02/16/18 2111   WBC  9.3  5.3   HGB  11.6  12.5   HCT  33.3*  36.2   PLT  274  288     Recent Labs      02/18/18 0342 02/16/18 2111   NA  142  142   K  3.9  4.5   CL  110*  107   CO2  23  29   BUN  15  16   CREA  0.55  0.61   GLU  111*  86   CA  8.8  9.1   MG   --   2.5*   PHOS   --   3.8     Recent Labs      02/18/18 0342   SGOT  58*   ALT  59   AP  147*   TBILI  0.3   TP  6.5   ALB  3.2*   GLOB  3.3     No results for input(s): INR, PTP, APTT in the last 72 hours. No lab exists for component: INREXT, INREXT   No results for input(s): FE, TIBC, PSAT, FERR in the last 72 hours. No results found for: FOL, RBCF   No results for input(s): PH, PCO2, PO2 in the last 72 hours. No results for input(s): CPK, CKNDX, TROIQ in the last 72 hours.     No lab exists for component: CPKMB  No results found for: CHOL, CHOLX, CHLST, CHOLV, HDL, LDL, LDLC, DLDLP, TGLX, TRIGL, TRIGP, CHHD, CHHDX  No results found for: GLUCPOC  No results found for: COLOR, APPRN, SPGRU, REFSG, TRI, PROTU, GLUCU, KETU, BILU, UROU, ZEENAT, LEUKU, GLUKE, EPSU, BACTU, WBCU, RBCU, CASTS, UCRY      Medications Reviewed:     Current Facility-Administered Medications   Medication Dose Route Frequency    iopamidol (ISOVUE-370) 76 % injection 100 mL  100 mL IntraVENous RAD ONCE    sodium chloride (NS) flush 10 mL  10 mL IntraVENous RAD ONCE    sodium chloride 0.9 % bolus infusion 100 mL  100 mL IntraVENous RAD ONCE    iohexol (OMNIPAQUE) solution 50 mL  50 mL Oral RAD ONCE    sodium chloride (NS) flush 5-10 mL  5-10 mL IntraVENous Q8H    sodium chloride (NS) flush 5-10 mL  5-10 mL IntraVENous PRN    0.9% sodium chloride infusion  75 mL/hr IntraVENous CONTINUOUS    acetaminophen (TYLENOL) tablet 650 mg  650 mg Oral Q4H PRN    HYDROcodone-acetaminophen (NORCO) 5-325 mg per tablet 1 Tab  1 Tab Oral Q4H PRN    ondansetron (ZOFRAN) injection 4 mg  4 mg IntraVENous Q4H PRN    docusate sodium (COLACE) capsule 100 mg  100 mg Oral BID    famotidine (PF) (PEPCID) 20 mg in sodium chloride 10 mL injection  20 mg IntraVENous Q12H    dexamethasone (DECADRON) tablet 4 mg  4 mg Oral Q6H    levETIRAcetam (KEPPRA) 500 mg in 0.9% sodium chloride 100 mL IVPB  500 mg IntraVENous Q12H    cholecalciferol (VITAMIN D3) tablet 4,000 Units  4,000 Units Oral DAILY    ascorbic acid (vitamin C) (VITAMIN C) tablet 250 mg  250 mg Oral DAILY    therapeutic multivitamin (THERAGRAN) tablet 1 Tab  1 Tab Oral DAILY    acetaminophen (TYLENOL) tablet 650 mg  650 mg Oral Q4H PRN     ______________________________________________________________________  EXPECTED LENGTH OF STAY: - - -  ACTUAL LENGTH OF STAY:          2                 Cheyenne Hsu MD

## 2018-02-18 NOTE — PROGRESS NOTES
Awake alert  MRI shows to my eye a secnd very small lesion on the right that is too small to operate on, would be better to use gamma knife post op  Also confirms the lesion on the left that I am planning on taking out tomorrow.  She is stable neurologically  Explained risks and benefits and results of MRI  Metastatic w/u does not change what I recommend regarding the brain tumor

## 2018-02-18 NOTE — PROGRESS NOTES
I returned and provided communion (non-Christianity) to Ms. Graham and one visitor. Spiritual Care remains available as needed. Rev. Mariel Howe M.Div, [facility]

## 2018-02-19 NOTE — ANESTHESIA PROCEDURE NOTES
Arterial Line Placement    Performed by: Sejal Dawson by: Julia Mliler     Pre-Procedure  Indications:  Arterial pressure monitoring and blood sampling  Preanesthetic Checklist: patient identified, risks and benefits discussed, anesthesia consent, site marked, patient being monitored, timeout performed and patient being monitored      Procedure:   Prep:  Chlorhexidine  Seldinger Technique?: No    Orientation:  Right  Location:  Radial artery  Catheter size:  22 G  Number of attempts:  1  Cont Cardiac Output Sensor: No      Assessment:   Post-procedure:  Line secured and sterile dressing applied  Patient Tolerance:  Patient tolerated the procedure well with no immediate complications

## 2018-02-19 NOTE — ANESTHESIA POSTPROCEDURE EVALUATION
Post-Anesthesia Evaluation and Assessment    Patient: Jimena Willett MRN: [id number]  SSN: [id number]    YOB: 1960  Age: 62 y.o. Sex: female       Cardiovascular Function/Vital Signs  Visit Vitals    /66    Pulse 86    Temp 36.5 °C (97.7 °F)    Resp 24    Ht 5' 7\" (1.702 m)    Wt 69 kg (152 lb 1.9 oz)    SpO2 100%    Breastfeeding No    BMI 23.82 kg/m2       Patient is status post general anesthesia for Procedure(s):  LEFT TEMPORAL CRANIOTOMY FOR TUMOR REMOVAL USING BRAIN LAB GUIDANCE. Nausea/Vomiting: None    Postoperative hydration reviewed and adequate. Pain:  Pain Scale 1: Numeric (0 - 10) (02/19/18 1712)  Pain Intensity 1: 3 (02/19/18 1712)   Managed    Neurological Status:   Neuro (WDL):  (s/p craniotomy) (02/19/18 1612)  Neuro  Neurologic State: Alert (02/19/18 1700)  Orientation Level: Oriented X4 (02/19/18 1700)  Cognition: Follows commands (02/19/18 1700)  Speech: Clear (02/19/18 1700)  Assessment L Pupil:  (equal and reactive) (02/19/18 1700)  Size L Pupil (mm): 3 (02/19/18 1700)  Assessment R Pupil:  (equal and reactive) (02/19/18 1700)  Size R Pupil (mm): 3 (02/19/18 1700)  LUE Motor Response: Purposeful (02/19/18 1700)  LLE Motor Response: Purposeful (02/19/18 1700)  RUE Motor Response: Purposeful (02/19/18 1700)  RLE Motor Response: Purposeful (02/19/18 1700)   At baseline    Mental Status and Level of Consciousness: Arousable    Pulmonary Status:   O2 Device: Nasal cannula (02/19/18 1700)   Adequate oxygenation and airway patent    Complications related to anesthesia: None    Post-anesthesia assessment completed.  No concerns    Signed By: Adriano Gomez MD     February 19, 2018

## 2018-02-19 NOTE — PROGRESS NOTES
I visited Ms. Graham prior to her surgery and provided pastoral support and prayer. She was accompanied by her  and daughter-in-law. Licha is a very important coping resource for her. Spiritual Care remains available as needed. Rev. Lavanda Kanner Von Horn, M.Div, [facility]

## 2018-02-19 NOTE — PROGRESS NOTES
Bedside shift change report given to LAVINIA Sanders (oncoming nurse) by Miri Henao RN (offgoing nurse). Report included the following information SBAR, Kardex, MAR and Cardiac Rhythm NSR.

## 2018-02-19 NOTE — PROGRESS NOTES
Problem: Falls - Risk of  Goal: *Absence of Falls  Document Darin Fall Risk and appropriate interventions in the flowsheet.    Outcome: Progressing Towards Goal  Fall Risk Interventions:            Medication Interventions: Bed/chair exit alarm, Patient to call before getting OOB, Evaluate medications/consider consulting pharmacy, Teach patient to arise slowly

## 2018-02-19 NOTE — PROGRESS NOTES
[facility]  Medical Oncology at [facility]    Hematology / Oncology Progress Note    Reason for Visit:   Yue Samuel is a 62 y.o. female who is seen in consultation at the request of Dr. Alli Mendez for evaluation of brain mass in setting of metastatic breast cancer. Hematology Oncology Diagnosis and Treatment History:   Diagnosis: Metastatic breast cancer to liver and bone, now brain    TREATMENT COURSE: Manju Dexter started 6/17 and stopped 10/17.    4/2004, s/p L mastectomy with Dr. Brooks Rueda at [facility]. Stage IIB, T2N1M0, 3/39 LN +, ER negative, NH +, HER 2 +, was enrolled on NSABP B-31 trial with adriamycin/cytoxan q 3 weeks x 4 followed by taxol weekly with herceptin x 12 then completion of a year of herceptin. States she did not receive XRT. Did receive 5 years of tamoxifen from 0379-1077, which she tolerated well. In 2009 she noticed a chest wall mass. 11/23/10 FNA of chest wall mass showed ER + > 90%, NH + > 90%, MIB 50%, HER 2 + by FISH ratio 6.29. S/p BSO 12/27/10. Received zometa from 2010 to 2011. In 2011, she was started on q 3 week trastuzumab and exemestane, but states she took the exemestane sparingly due to joint pains and body aches. Has not taken any AI since 2013. Briefly took anastrozole in 5/2014. Stopped anastrozole 1/1/15 due to continued body and joint aches. Started Herceptin 1/9/17, stopped 4/25/17 due to progressive disease noted on scans  Started Kadcyla 3.6 mg/kg every 21 days on 6/1/17, reduced to 3 mg/kg due to side effects on 6/26/17    History of Present Illness:   Ms. Wong Jordan is a 61 y/o female with metastatic breast cancer followed by Dr. Melanie Abad admitted with a new left temporal lobe brain mass. Patient was recently seen by Dr. Melanie Abad in [facility] clinic on 2/8/18 with plan for restaging scans and brain MRI given left chest pain/paresthesias and vision changes.  However, the patient had been putting off imaging due to cost. Patient has been off of treatment for her metastatic breast cancer since 10/2017 per patient preference. Prior scans in 8/2017 showed good response to treatment and scan sin 11/2017 showed stable disease. After the patient's brain MRI w/o contrast revealed new brain mass, patient was referred to the ER. She was started on IV Dexamethasone. She denies any current headaches. She states that her vision does seem improved after starting the steroids. She denies any weakness in her arms/legs, gait abnormalities, confusion, n/v/d. No fevers. She describes a left-sided chest discomfort characterized by tingling which has been present for the last several weeks to months. She states she is also concerned because her tumor markers have increased. She has been evaluated by Neurosurgery who recommends repeating MRI, this time with IV contrast, for better characterization and to rule out any smaller lesions. He also recommends surgical resection of this tumor and decrease in dose of Dexamethasone. INTERVAL HISTORY: Patient resting in bed and supportive  present. MRI with contrast 2/18/18 with left and right temporal lobe ring-enhancing masses consistent with metastatic disease. Plans for surgical resection of 1.3 cm left posterior mass today with NSG. She states she feels well overall and is ready for surgery. Denies headaches, dizziness, or vision changes currently. Breathing feels comfortable and she denies any pain. She remains on decadron and keppra per NSG.      Past Medical History:   Diagnosis Date    Breast CA ([address]     Metastatic cancer [facility])       Past Surgical History:   Procedure Laterality Date    BREAST SURGERY PROCEDURE UNLISTED  2004    mastectomy    HX GYN  2010    ovaries removed      Social History   Substance Use Topics    Smoking status: Never Smoker    Smokeless tobacco: Never Used    Alcohol use Yes      Comment: 3-4 drinks/month      Family History   Problem Relation Age of Onset    Hypertension Mother     Cancer Father mesothelioma    Cancer Maternal Grandfather 75     Bladder     Current Facility-Administered Medications   Medication Dose Route Frequency    lactated Ringers infusion  125 mL/hr IntraVENous CONTINUOUS    sodium chloride (NS) flush 5-10 mL  5-10 mL IntraVENous PRN    oxyCODONE-acetaminophen (PERCOCET) 5-325 mg per tablet 1 Tab  1 Tab Oral PRN    fentaNYL citrate (PF) injection 25 mcg  25 mcg IntraVENous Multiple    morphine injection 2 mg  2 mg IntraVENous Multiple    HYDROmorphone (PF) (DILAUDID) injection 0.2 mg  0.2 mg IntraVENous Multiple    ondansetron (ZOFRAN) injection 4 mg  4 mg IntraVENous PRN    midazolam (VERSED) injection 0.5 mg  0.5 mg IntraVENous Q5MIN PRN    diphenhydrAMINE (BENADRYL) injection 12.5 mg  12.5 mg IntraVENous PRN    0.45% sodium chloride with KCl 20 mEq/L infusion   IntraVENous CONTINUOUS    sodium chloride (NS) flush 5-10 mL  5-10 mL IntraVENous Q8H    sodium chloride (NS) flush 5-10 mL  5-10 mL IntraVENous PRN    0.9% sodium chloride infusion  75 mL/hr IntraVENous CONTINUOUS    acetaminophen (TYLENOL) tablet 650 mg  650 mg Oral Q4H PRN    HYDROcodone-acetaminophen (NORCO) 5-325 mg per tablet 1 Tab  1 Tab Oral Q4H PRN    ondansetron (ZOFRAN) injection 4 mg  4 mg IntraVENous Q4H PRN    docusate sodium (COLACE) capsule 100 mg  100 mg Oral BID    famotidine (PF) (PEPCID) 20 mg in sodium chloride 10 mL injection  20 mg IntraVENous Q12H    dexamethasone (DECADRON) tablet 4 mg  4 mg Oral Q6H    levETIRAcetam (KEPPRA) 500 mg in 0.9% sodium chloride 100 mL IVPB  500 mg IntraVENous Q12H    cholecalciferol (VITAMIN D3) tablet 4,000 Units  4,000 Units Oral DAILY    ascorbic acid (vitamin C) (VITAMIN C) tablet 250 mg  250 mg Oral DAILY    therapeutic multivitamin (THERAGRAN) tablet 1 Tab  1 Tab Oral DAILY    acetaminophen (TYLENOL) tablet 650 mg  650 mg Oral Q4H PRN      Allergies   Allergen Reactions    Pcn [Penicillins] Anaphylaxis    Contrast Agent [Iodine] Shortness of Breath and Itching     Patient reported during phone conversation on 4/18/17. Review of Systems: A complete review of systems was obtained, negative except as described above. Physical Exam:     Visit Vitals    /62    Pulse 88    Temp 97.7 °F (36.5 °C)    Resp 21    Ht 5' 7\" (1.702 m)    Wt 152 lb 1.9 oz (69 kg)    SpO2 100%    Breastfeeding No    BMI 23.82 kg/m2     ECOG PS: 0  General: No distress  Eyes: PERRLA, anicteric sclerae  HENT: Atraumatic with normal appearance of ears and nose; OP clear  Neck: Supple; no thyromegaly   Lymphatic: No cervical, supraclavicular, axillary adenopathy  Respiratory: CTAB, normal respiratory effort  CV: Normal rate, regular rhythm, no murmurs, no peripheral edema  GI: Soft, nontender, nondistended, no masses, no hepatomegaly, no splenomegaly  MS: Unable to assess gait. Digits without clubbing or cyanosis. Skin: No rashes, ecchymoses, or petechiae. Normal temperature, turgor, and texture. Neuro/Psych: Alert, oriented, appropriate affect, normal judgment/insight      Results:     Lab Results   Component Value Date/Time    WBC 9.3 02/18/2018 03:42 AM    HGB 11.6 02/18/2018 03:42 AM    HCT 33.3 (L) 02/18/2018 03:42 AM    PLATELET 370 98/44/9471 03:42 AM    MCV 93.5 02/18/2018 03:42 AM    ABS. NEUTROPHILS 8.2 (H) 02/18/2018 03:42 AM     Lab Results   Component Value Date/Time    Sodium 142 02/18/2018 03:42 AM    Potassium 3.9 02/18/2018 03:42 AM    Chloride 110 (H) 02/18/2018 03:42 AM    CO2 23 02/18/2018 03:42 AM    Glucose 111 (H) 02/18/2018 03:42 AM    BUN 15 02/18/2018 03:42 AM    Creatinine 0.55 02/18/2018 03:42 AM    GFR est AA >60 02/18/2018 03:42 AM    GFR est non-AA >60 02/18/2018 03:42 AM    Calcium 8.8 02/18/2018 03:42 AM     Lab Results   Component Value Date/Time    Bilirubin, total 0.3 02/18/2018 03:42 AM    ALT (SGPT) 59 02/18/2018 03:42 AM    AST (SGOT) 58 (H) 02/18/2018 03:42 AM    Alk.  phosphatase 147 (H) 02/18/2018 03:42 AM    Protein, total 6.5 02/18/2018 03:42 AM    Albumin 3.2 (L) 02/18/2018 03:42 AM    Globulin 3.3 02/18/2018 03:42 AM     Brain MRI without contrast 2/16/18: IMPRESSION: 1.3 cm left posterior temporal lobe mass with surrounding vasogenic  edema likely represents a metastases. No other lesions are identified. If  patient is willing, the patient could return for contrast-enhanced sequences. Brain MRI with contrast 2/18/18  IMPRESSION:  Left and right temporal lobe ring-enhancing masses, most consistent  with metastatic disease.     CT C/A/P 2/18/18  IMPRESSION:   1. Nonspecific reticular nodular densities throughout the lungs may reflect  infectious or inflammatory process although metastatic neoplastic disease is not  excluded.     2. Stable bony metastases status post left mastectomy and left implant  reconstruction.     3. No other sites of suspected metastatic disease identified within the thorax,  abdomen, or pelvis.       Assessment and Recommendations:   Maru Swann is a 62 y.o. female with metastatic breast cancer admitted with new brain mass. 1. Metastatic breast cancer: Was originally Stage IIB, hormone receptor positive, Her2 negative at diagnosis in 2004 and treated with left mastectomy and adjuvant chemotherapy, no radiation. She was treated with adjuvant endocrine therapy, but was diagnosed recurrence in Nov 2010, now with Her2 positive and ER/MD positive disease. Has been off of treatment with Kadcyla since Oct 2017 per patient request. CT C/A/P revealed stable bony metastasis and no other sites of suspected metastatic disease in the thorax, abdomen, or pelvis. Dr. Jazmyn Watts to discuss therapies needed in post-operative setting once she has recovered from surgery. 2. Left temporal brain mass: Most likely 2/2 metastatic breast cancer. Brain MRI with contrast revealed mass seen from MRI WO contrast and additional mass per #3. Neurosurgery planning to resect this afternoon.  Continue dexamethasone and keppra per NSG. 3. Right temporal lobe mass: Most likely 2/2 metastatic breast cancer. Given small size, may be a good candidate for GK. 4. Transaminitis: Mild. CT abdomen/pelvis without evidence of disease in the liver. We will continue to follow and support while she is here. Please call with any questions. Pt seen today in conjunction with NICK Vega. Pt seen this am with  at bedside. Reviewed new metastatic breast cancer dx. CTs body stable. Pt is for neurosurgery today and feels comfortable with this decision. Will discussed further systemic treatment once recovered from all this. We will follow.      Signed By: Megan Vickers, DO     February 19, 2018

## 2018-02-19 NOTE — CDMP QUERY
Please clarify if this patient is (was) being treated/managed for: Cerebral edema in the setting of Brain mass, Breast CA with metastasis, MRI reveals Vasogenic edema requiring IV steroids, Neurosurgery consult, and monitoring.     => Other explanation of clinical findings  => Unable to determine (no explanation for clinical findings)    The medical record reflects the following clinical findings, treatment, and risk factors. Risk Factors:  Brain mass, Breast CA with metastasis    Clinical Indicators:    BRAIN MRI  IMPRESSION: 1.3 cm left posterior temporal lobe mass with surrounding vasogenic  edema likely represents a metastases. Treatment: IV steroids, Neurosurgery consult, monitoring     Please clarify and document your clinical opinion in the progress notes and discharge summary including the definitive and/or presumptive diagnosis, (suspected or probable), related to the above clinical findings. Please include clinical findings supporting your diagnosis.     thank you  Sherlyn Manriquez  [facility]  [phone]

## 2018-02-19 NOTE — PROGRESS NOTES
Hospitalist Progress Note  Jose Seymour MD  Answering service: [phone] OR 36 from in house phone  Cell: [phone]      Date of Service:  2018  NAME:  Thierno Love  :  1960  MRN:  [id number]      Admission Summary:   61 yo female with left Breast Ca with Mets to bone, admitted for headache. Pt completed Chemo on 2017. Pt states she was having intermittent HAs like migraines and also visual changes. Pt had MRI as outpatient which showed small left temporal lobe lesion with edema. Interval history / Subjective:   Pt seen and examined earlier today   No HA or visual changes  NPO for surgery      Assessment & Plan:     Left temporal lobe mass with brain swelling likely metastatic breast ca  - c/w Decadron  - Keppra for seizure prophylaxis  - Neurosurgery consulted, plan for OR today, followed by Focused radiation tx by Devin Ye as outpatient. - neuro checks  - MRI Brain with Cont noted: Left and right temporal lobe ring-enhancing masses, most consistent with metastatic disease.  - Rt temporal lobe mass will need to be addressed with Gael Clements outpatient with      Left Breast Ca with mets to bone and now likely brain  - s/p completion of Chemo 2017  - oncology eval  - CT chest/abd/pel noted      Code status: FULL  DVT prophylaxis: Portia Flower discussed with: Patient/Family and Nurse  Disposition: Home w/Family     Hospital Problems  Date Reviewed: 2018          Codes Class Noted POA    * (Principal)Brain mass ICD-10-CM: G93.9  ICD-9-CM: 348.9  2018 Yes                Review of Systems:   A comprehensive review of systems was negative except for that written in the HPI. Vital Signs:    Last 24hrs VS reviewed since prior progress note.  Most recent are:  Visit Vitals    /69 (BP 1 Location: Left arm, BP Patient Position: At rest)    Pulse 71    Temp 98 °F (36.7 °C)    Resp 16    Ht 5' 7\" (1.702 m)    Wt 69 kg (152 lb 1.9 oz)    SpO2 100%    Breastfeeding No    BMI 23.82 kg/m2       No intake or output data in the 24 hours ending 02/19/18 1249     Physical Examination:             Constitutional:  No acute distress, cooperative, pleasant    ENT:  Oral mucous moist   Resp:  CTA bilaterall   CV:  Regular rhythm, normal rat    GI:  Soft, non distended, non tender. normoactive bowel sounds    Musculoskeletal:  No edema, warm, 2+ pulses throughout    Neurologic:  alert and awake, PERRLA, EOMI, motor 5/5, no drift, clear speech     Skin:  Good turgor, no rashes or ulcers       Data Review:    Review and/or order of clinical lab test  Review and/or order of tests in the radiology section of CPT  Review and/or order of tests in the medicine section of CPT      Labs:     Recent Labs      02/18/18 0342  02/16/18 2111   WBC  9.3  5.3   HGB  11.6  12.5   HCT  33.3*  36.2   PLT  274  288     Recent Labs      02/18/18 0342  02/16/18 2111   NA  142  142   K  3.9  4.5   CL  110*  107   CO2  23  29   BUN  15  16   CREA  0.55  0.61   GLU  111*  86   CA  8.8  9.1   MG   --   2.5*   PHOS   --   3.8     Recent Labs      02/18/18 0342   SGOT  58*   ALT  59   AP  147*   TBILI  0.3   TP  6.5   ALB  3.2*   GLOB  3.3     No results for input(s): INR, PTP, APTT in the last 72 hours. No lab exists for component: INREXT, INREXT   No results for input(s): FE, TIBC, PSAT, FERR in the last 72 hours. No results found for: FOL, RBCF   No results for input(s): PH, PCO2, PO2 in the last 72 hours. No results for input(s): CPK, CKNDX, TROIQ in the last 72 hours.     No lab exists for component: CPKMB  No results found for: CHOL, CHOLX, CHLST, CHOLV, HDL, LDL, LDLC, DLDLP, TGLX, TRIGL, TRIGP, CHHD, CHHDX  No results found for: GLUCPOC  No results found for: COLOR, APPRN, SPGRU, REFSG, TRI, PROTU, GLUCU, KETU, BILU, UROU, ZEENAT, LEUKU, GLUKE, EPSU, BACTU, WBCU, RBCU, CASTS, UCRY      Medications Reviewed:     Current Facility-Administered Medications   Medication Dose Route Frequency    lactated Ringers infusion  125 mL/hr IntraVENous CONTINUOUS    0.9% sodium chloride infusion  25 mL/hr IntraVENous CONTINUOUS    sodium chloride (NS) flush 5-10 mL  5-10 mL IntraVENous Q8H    sodium chloride (NS) flush 5-10 mL  5-10 mL IntraVENous PRN    lidocaine (PF) (XYLOCAINE) 10 mg/mL (1 %) injection 0.1 mL  0.1 mL SubCUTAneous PRN    fentaNYL citrate (PF) injection 50 mcg  50 mcg IntraVENous PRN    midazolam (VERSED) injection 1 mg  1 mg IntraVENous PRN    sodium chloride (NS) flush 5-10 mL  5-10 mL IntraVENous Q8H    sodium chloride (NS) flush 5-10 mL  5-10 mL IntraVENous PRN    0.9% sodium chloride infusion  75 mL/hr IntraVENous CONTINUOUS    acetaminophen (TYLENOL) tablet 650 mg  650 mg Oral Q4H PRN    HYDROcodone-acetaminophen (NORCO) 5-325 mg per tablet 1 Tab  1 Tab Oral Q4H PRN    ondansetron (ZOFRAN) injection 4 mg  4 mg IntraVENous Q4H PRN    docusate sodium (COLACE) capsule 100 mg  100 mg Oral BID    famotidine (PF) (PEPCID) 20 mg in sodium chloride 10 mL injection  20 mg IntraVENous Q12H    dexamethasone (DECADRON) tablet 4 mg  4 mg Oral Q6H    levETIRAcetam (KEPPRA) 500 mg in 0.9% sodium chloride 100 mL IVPB  500 mg IntraVENous Q12H    cholecalciferol (VITAMIN D3) tablet 4,000 Units  4,000 Units Oral DAILY    ascorbic acid (vitamin C) (VITAMIN C) tablet 250 mg  250 mg Oral DAILY    therapeutic multivitamin (THERAGRAN) tablet 1 Tab  1 Tab Oral DAILY    acetaminophen (TYLENOL) tablet 650 mg  650 mg Oral Q4H PRN     ______________________________________________________________________  EXPECTED LENGTH OF STAY: 3d 9h  ACTUAL LENGTH OF STAY:          3                 Chance Barahona MD

## 2018-02-19 NOTE — PERIOP NOTES
TRANSFER - IN REPORT:    Verbal report received from [address] [place] [address] RN(name) on Julia Graham  being received from Neuro(unit) for ordered procedure      Report consisted of patients Situation, Background, Assessment and   Recommendations(SBAR). Information from the following report(s) SBAR, Kardex and Recent Results was reviewed with the receiving nurse. Opportunity for questions and clarification was provided. Assessment completed upon patients arrival to unit and care assumed.

## 2018-02-19 NOTE — ANESTHESIA PREPROCEDURE EVALUATION
Anesthetic History     PONV          Review of Systems / Medical History  Patient summary reviewed, nursing notes reviewed and pertinent labs reviewed    Pulmonary  Within defined limits                 Neuro/Psych   Within defined limits           Cardiovascular  Within defined limits                     GI/Hepatic/Renal  Within defined limits              Endo/Other        Cancer     Other Findings            Physical Exam    Airway  Mallampati: II  TM Distance: > 6 cm  Neck ROM: normal range of motion   Mouth opening: Normal     Cardiovascular  Regular rate and rhythm,  S1 and S2 normal,  no murmur, click, rub, or gallop             Dental  No notable dental hx       Pulmonary  Breath sounds clear to auscultation               Abdominal  GI exam deferred       Other Findings            Anesthetic Plan    ASA: 3  Anesthesia type: total IV anesthesia and general    Monitoring Plan: Arterial line      Induction: Intravenous  Anesthetic plan and risks discussed with: Patient

## 2018-02-19 NOTE — INTERDISCIPLINARY ROUNDS
IDR/SLIDR Summary          Patient: Omi Dyson MRN: [id number]    Age: 62 y.o. YOB: 1960 Room/Bed: [facility]   Admit Diagnosis: Brain mass  left temporal brain tumor  Principal Diagnosis: Brain mass   Goals: Surgery  Readmission: NO  Quality Measure: SCIP  VTE Prophylaxis: Chemical  Influenza Vaccine screening completed? YES  Pneumococcal Vaccine screening completed? YES  Mobility needs: Yes   Nutrition plan:Yes  Consults: P. T and O.T. Financial concerns:No  Escalated to CM? NO  RRAT Score: 12   Interventions:  Testing due for pt today?  YES Surgery  LOS: 3 days Expected length of stay 6 days  Discharge plan: Home   PCP: Shanika Min MD  Transportation needs: No    Days before discharge:two or more days before discharge   Discharge disposition: Home    Signed:     Ashley Sloan RN  [id number]  2/19/18

## 2018-02-19 NOTE — PERIOP NOTES
TRANSFER - OUT REPORT:    Verbal report given to NURSE MARILY(name) on Julia Graham  being transferred to ICU 2(unit) for routine post - op       Report consisted of patients Situation, Background, Assessment and   Recommendations(SBAR). Time Pre op antibiotic given:[email or web address]  Anesthesia Stop time: 5433  Finney Present on Transfer to floor:YES  Order for Finney on Chart:YES  Discharge Prescriptions with Chart:NO    Information from the following report(s) SBAR, OR Summary, Intake/Output, MAR and Cardiac Rhythm NSR was reviewed with the receiving nurse. Opportunity for questions and clarification was provided. Is the patient on 02? NO      Is the patient on a monitor? YES    Is the nurse transporting with the patient? YES    Surgical Waiting Area notified of patient's transfer from PACU? YES      The following personal items collected during your admission accompanied patient upon transfer:   Dental Appliance: Dental Appliances: None  Vision: Visual Aid: Glasses  Hearing Aid:    Jewelry: Jewelry: None  Clothing: Clothing: At bedside  Other Valuables:  Other Valuables: None  Valuables sent to safe:

## 2018-02-19 NOTE — PROGRESS NOTES
Bedside and Verbal shift change report given to Jeanette Fernandez (oncoming nurse) by Hans Begum (offgoing nurse). Report included the following information SBAR, MAR and Recent Results.

## 2018-02-20 NOTE — PROGRESS NOTES
Patient with a PMH for breast cancer with metastasis to the bone was in her usual state of times about a month ago when the patient started experiencing a headache. The headache is intermittent associated with blurring of vision located at the left side of the head, MRI shows a brain mass. Patient is s/p  Left temporal craniotomy, excision of metastatic temporal lobe tumor. Care manager met with patient and her  Carlitos Castaneda ( [phone])TJ explain role and discuss discharge planning. Patient is independent, no previous home health or equipment needs. She confirmed her PCP to be Dr Nydia Min and uses the [facility] as her pharmacy. CM confirmed the address and insurance information to be correct on the demographic sheet. No discharge needs have been identified, care management will continue to follow. Sofía Ames RN,CRM  Care Management Interventions  PCP Verified by CM:  Yes (Dr Nydia Min)  [address] DarrelAyanna Munoz: No  Discharge Durable Medical Equipment: No  Physical Therapy Consult: No  Occupational Therapy Consult: No  Speech Therapy Consult: No  Current Support Network: Lives with Spouse Carlitos Castaneda [phone])  Confirm Follow Up Transport: Family ( will transport)  Discharge Location  Discharge Placement: Home

## 2018-02-20 NOTE — PROGRESS NOTES
Hospitalist Progress Note  Elida Galo MD  Answering service: [phone] OR 36 from in house phone  Cell: [phone]      Date of Service:  2018  NAME:  Vickie Cowden  :  1960  MRN:  [id number]      Admission Summary:   63 yo female with left Breast Ca with Mets to bone, admitted for headache. Pt completed Chemo on 2017. Pt states she was having intermittent HAs like migraines and also visual changes. Pt had MRI as outpatient which showed small left temporal lobe lesion with edema. Interval history / Subjective:   S/p metastatic brain lesion surgery  NSGY and oncology following     Assessment & Plan:     Left temporal lobe mass with cerebral oedema likely metastatic breast ca  - c/w Decadron  - Keppra for seizure prophylaxis  - S/P resection by NSGY , followed by Focused radiation tx by Lio Stephenson as outpatient. - neuro checks  - MRI Brain with Cont noted: Left and right temporal lobe ring-enhancing masses, most consistent with metastatic disease.  - Rt temporal lobe mass will need to be addressed with Gael Clements outpatient with   - WBC bump from decadron     Left Breast Ca with mets to bone and now likely brain  - s/p completion of Chemo 2017  - oncology evaluated plan per oncology  - CT chest/abd/pel noted      Code status: FULL  DVT prophylaxis: Portia Flower discussed with: Patient/Family and Nurse  Disposition: Home w/Family     Hospital Problems  Date Reviewed: 2018          Codes Class Noted POA    * (Principal)Brain mass ICD-10-CM: G93.9  ICD-9-CM: 348.9  2018 Yes                Review of Systems:   A comprehensive review of systems was negative except for that written in the HPI. Vital Signs:    Last 24hrs VS reviewed since prior progress note.  Most recent are:  Visit Vitals    BP 98/65    Pulse 99    Temp 98.6 °F (37 °C)    Resp 23    Ht 5' 7\" (1.702 m)    Wt 66.8 kg (147 lb 4.8 oz)    SpO2 99%    Breastfeeding No    BMI 23.07 kg/m2         Intake/Output Summary (Last 24 hours) at 02/20/18 1158  Last data filed at 02/20/18 0900   Gross per 24 hour   Intake           3352.5 ml   Output             1450 ml   Net           1902.5 ml        Physical Examination:             Constitutional:  No acute distress, cooperative, pleasant    ENT:  Oral mucous moist   Resp:  CTA bilaterall   CV:  Regular rhythm, normal     GI:  Soft, non distended, non tender. normoactive bowel sounds    Musculoskeletal:  No edema, warm, 2+ pulses throughout    Neurologic:  alert and awake, PERRLA, EOMI, motor 5/5, no drift, clear speech     Skin:  Good turgor, no rashes or ulcers       Data Review:    Review and/or order of clinical lab test  Review and/or order of tests in the radiology section of CPT  Review and/or order of tests in the medicine section of CPT      Labs:     Recent Labs      02/20/18 0245  02/18/18 0342   WBC  14.0*  9.3   HGB  12.5  11.6   HCT  36.8  33.3*   PLT  330  274     Recent Labs      02/20/18   0245  02/18/18   0342   NA  139  142   K  4.1  3.9   CL  104  110*   CO2  26  23   BUN  14  15   CREA  0.70  0.55   GLU  99  111*   CA  9.4  8.8     Recent Labs      02/18/18   0342   SGOT  58*   ALT  59   AP  147*   TBILI  0.3   TP  6.5   ALB  3.2*   GLOB  3.3     No results for input(s): INR, PTP, APTT in the last 72 hours. No lab exists for component: INREXT, INREXT   No results for input(s): FE, TIBC, PSAT, FERR in the last 72 hours. No results found for: FOL, RBCF   No results for input(s): PH, PCO2, PO2 in the last 72 hours. No results for input(s): CPK, CKNDX, TROIQ in the last 72 hours.     No lab exists for component: CPKMB  No results found for: CHOL, CHOLX, CHLST, CHOLV, HDL, LDL, LDLC, DLDLP, TGLX, TRIGL, TRIGP, CHHD, CHHDX  No results found for: GLUCPOC  No results found for: COLOR, APPRN, SPGRU, REFSG, TRI, PROTU, GLUCU, KETU, BILU, UROU, ZEENAT, LEUKU, GLUKE, EPSU, BACTU, WBCU, RBCU, CHELSEA BRITTON      Medications Reviewed:     Current Facility-Administered Medications   Medication Dose Route Frequency    sodium chloride (NS) flush 5-10 mL  5-10 mL IntraVENous Q8H    sodium chloride (NS) flush 5-10 mL  5-10 mL IntraVENous PRN    HYDROcodone-acetaminophen (NORCO) 5-325 mg per tablet 1 Tab  1 Tab Oral Q4H PRN    naloxone (NARCAN) injection 0.4 mg  0.4 mg IntraVENous PRN    morphine injection 2 mg  2 mg IntraVENous Q4H PRN    ceFAZolin (ANCEF) 2 g/20 mL in sterile water IV syringe  2 g IntraVENous Q8H    levETIRAcetam (KEPPRA) 1,000 mg in 0.9% sodium chloride 100 mL IVPB  1,000 mg IntraVENous Q12H    ondansetron (ZOFRAN) injection 4 mg  4 mg IntraVENous Q4H PRN    docusate sodium (COLACE) capsule 100 mg  100 mg Oral BID    0.45% sodium chloride with KCl 20 mEq/L infusion   IntraVENous CONTINUOUS    dexamethasone (DECADRON) tablet 4 mg  4 mg Oral Q6H    sodium chloride (NS) flush 10-30 mL  10-30 mL InterCATHeter PRN    sodium chloride (NS) flush 10-40 mL  10-40 mL InterCATHeter Q8H    alteplase (CATHFLO) 1 mg in sterile water (preservative free) 1 mL injection  1 mg InterCATHeter PRN    sodium chloride (NS) flush 5-10 mL  5-10 mL IntraVENous Q8H    sodium chloride (NS) flush 5-10 mL  5-10 mL IntraVENous PRN    famotidine (PF) (PEPCID) 20 mg in sodium chloride 10 mL injection  20 mg IntraVENous Q12H    cholecalciferol (VITAMIN D3) tablet 4,000 Units  4,000 Units Oral DAILY    ascorbic acid (vitamin C) (VITAMIN C) tablet 250 mg  250 mg Oral DAILY    therapeutic multivitamin (THERAGRAN) tablet 1 Tab  1 Tab Oral DAILY    acetaminophen (TYLENOL) tablet 650 mg  650 mg Oral Q4H PRN     ______________________________________________________________________  EXPECTED LENGTH OF STAY: 3d 9h  ACTUAL LENGTH OF STAY:          4                 Huerta Doom, MD

## 2018-02-20 NOTE — ROUTINE PROCESS
1930:  Bedside and Verbal shift change report given to Dudley Winslow (oncoming nurse) by Steve Diana RN and Hilda Carter (offgoing nurse). Report included the following information SBAR, Kardex, ED Summary, OR Summary, Intake/Output, MAR, Accordion, Recent Results, Med Rec Status and Cardiac Rhythm sinus rhythm. 0730: Bedside and Verbal shift change report given to Valorie Fair RN (oncoming nurse) by Chelsie Lee RN (offgoing nurse). Report included the following information SBAR, Kardex, OR Summary, Procedure Summary, Intake/Output, MAR, Accordion, Recent Results, Med Rec Status and Cardiac Rhythm sinus rhythm.

## 2018-02-20 NOTE — PROGRESS NOTES
2000:  Received patient sitting in bed, talking with family and Dr Willian Garza. Per Dr Willian Garza, ok to remove Art line and change decadron to PO. All questioned from patient and family answered by Dr Willian Garza. Neuro assessment done by off going RN and on coming RN. A&O x4, MAGANA, FC,  equal, no facial droop, PERRL. SBP < 160. Patient up to [facility] to void. No complaints of pain/headache/blurry vision at this time. 2200:  Break through drainage noted on dressing. Daughter-in-law noticed very small cut to forehead, just right of midline, near hair line. No bleeding from cut. No pain associated with cut. Will monitor break through drainage and cut.

## 2018-02-20 NOTE — OP NOTES
[facility] OP NOTE    Sima Gottron  MR#: [id number]  : 1960  ACCOUNT #: [de-identified]   DATE OF SERVICE: 2018    TITLE OF OPERATION:  Left temporal craniotomy, use of neuronavigation with BrainLAB and MRI, and excision of metastatic temporal lobe tumor. PREOPERATIVE DIAGNOSIS:  Neoplasm secondary malignant left temporal lobe. POSTOPERATIVE DIAGNOSIS:  Neoplasm secondary malignant left temporal lobe. SURGEON:  Joselyn Rodas MD.    ASSISTANT:       COMPLICATIONS:  None. ESTIMATED BLOOD LOSS:  Minimal.    ANESTHESIA:  General.    SPECIMENS:  Tumor. IMPLANTS:  None. OPERATIVE PROCEDURE:  Review of imaging studies revealed a metastatic lesion nearly 2 cm in size with a fair amount of vasogenic edema in the floor of the middle fossa posteriorly and very superficial in the left temporal lobe. After discussing risks, benefits, and alternatives of surgery with her, she wished to proceed and she was taken to the operating room where she was induced under general endotracheal anesthesia and placed on the operating table in the supine position, the head secured in the Virk head saleh and turned to the right. Prior to coming to the operating room, she had undergone an MRI with contrast for use with the neuronavigation BrainLAB system. The [employer] system was registered and a target site was identified in the posterior temporal region on the left side just above the tentorium. That area was shaved, scrubbed, prepped, and draped in the usual sterile fashion. A timeout was performed to identify the correct patient and procedure. Appropriate antibiotics were administered prior to the incision and sequential stockings for DVT prophylaxis were applied. A small linear incision was made directly over the planned target entry site. A self-retaining retractor was inserted into the edges of the incision after a sterile scrub, prep, and drape.   A free bone flap was elevated, it was elevated just above the sigmoid sinus, which was visualized as the bone flap was removed. The dura was opened in a cruciate fashion directly over the tumor. The assistant was able to retract slightly on one of the sulci as I performed a small cortical incision directly over the presumed area of the tumor using the neuronavigation system. Approximately a couple of millimeters deep to the surface, the tumor was encountered and was able to be circumferentially surrounded with cottonoids using bipolar electrocautery and microscissors to divide the vascular attachments. A gross total removal of the tumor was able to be achieved as the tumor was lifted out of its bed. Frozen section confirmed metastatic disease. The tumor resection bed was easily cauterized with bipolar electrocautery to control bleeding and then the bed was covered with Surgicel. The small opening in the dura was covered with a Gelfoam sponge about 1 x 1 cm in size. The bone flap was replaced and secured with miniplates from the [facility] system. The galea was closed with 2-0 interrupted inverted Vicryl sutures and staples were applied to the skin edges and a sterile dressing was applied. The needle, sponge, and instrument count were correct at the end of the procedure.       MD PEYTON Neri / MELINDA  D: 02/19/2018 19:01     T: 02/19/2018 20:40  JOB #: [id number]

## 2018-02-20 NOTE — PROGRESS NOTES
[facility] DISTRICT  Medical Oncology at [facility]    Hematology / Oncology Progress Note    Reason for Visit:   Thierno Love is a 62 y.o. female who is seen in consultation at the request of Dr. Miladys Beckham for evaluation of brain mass in setting of metastatic breast cancer. Hematology Oncology Diagnosis and Treatment History:   Diagnosis: Metastatic breast cancer to liver and bone, now brain    TREATMENT COURSE: Severa Sagarsusu started 6/17 and stopped 10/17.    4/2004, s/p L mastectomy with Dr. Heidi Goss at [facility]. Stage IIB, T2N1M0, 3/39 LN +, ER negative, CA +, HER 2 +, was enrolled on NSABP B-31 trial with adriamycin/cytoxan q 3 weeks x 4 followed by taxol weekly with herceptin x 12 then completion of a year of herceptin. States she did not receive XRT. Did receive 5 years of tamoxifen from 6255-4634, which she tolerated well. In 2009 she noticed a chest wall mass. 11/23/10 FNA of chest wall mass showed ER + > 90%, CA + > 90%, MIB 50%, HER 2 + by FISH ratio 6.29. S/p BSO 12/27/10. Received zometa from 2010 to 2011. In 2011, she was started on q 3 week trastuzumab and exemestane, but states she took the exemestane sparingly due to joint pains and body aches. Has not taken any AI since 2013. Briefly took anastrozole in 5/2014. Stopped anastrozole 1/1/15 due to continued body and joint aches. Started Herceptin 1/9/17, stopped 4/25/17 due to progressive disease noted on scans  Started Kadcyla 3.6 mg/kg every 21 days on 6/1/17, reduced to 3 mg/kg due to side effects on 6/26/17    History of Present Illness:   Ms. Selene Kelly is a 63 y/o female with metastatic breast cancer followed by Dr. Oscar Peraza admitted with a new left temporal lobe brain mass. Patient was recently seen by Dr. Oscar Peraza in [facility] on 2/8/18 with plan for restaging scans and brain MRI given left chest pain/paresthesias and vision changes.  However, the patient had been putting off imaging due to cost. Patient has been off of treatment for her metastatic breast cancer since 10/2017 per patient preference. Prior scans in 8/2017 showed good response to treatment and scan sin 11/2017 showed stable disease. After the patient's brain MRI w/o contrast revealed new brain mass, patient was referred to the ER. She was started on IV Dexamethasone. She denies any current headaches. She states that her vision does seem improved after starting the steroids. She denies any weakness in her arms/legs, gait abnormalities, confusion, n/v/d. No fevers. She describes a left-sided chest discomfort characterized by tingling which has been present for the last several weeks to months. She states she is also concerned because her tumor markers have increased. She has been evaluated by Neurosurgery who recommends repeating MRI, this time with IV contrast, for better characterization and to rule out any smaller lesions. He also recommends surgical resection of this tumor and decrease in dose of Dexamethasone. INTERVAL HISTORY: Patient seen today in the ICU POD#1 surgical resection of left posterior temporal lobe mass with Dr. Cordelia Ceja. She is doing very well this morning. She had some difficulty sleeping overnight and discussed with her likely steroid induced. Denies headaches, dizziness, or vision changes. She has no new complaints. Her son and daughter are at the bedside and supportive. Plans for probable discharge out of the ICU today.     Past Medical History:   Diagnosis Date    Breast CA ([address]     Metastatic cancer [facility])       Past Surgical History:   Procedure Laterality Date    BREAST SURGERY PROCEDURE UNLISTED  2004    mastectomy    HX GYN  2010    ovaries removed      Social History   Substance Use Topics    Smoking status: Never Smoker    Smokeless tobacco: Never Used    Alcohol use Yes      Comment: 3-4 drinks/month      Family History   Problem Relation Age of Onset    Hypertension Mother     Cancer Father      mesothelioma    Cancer Maternal Grandfather 76 Bladder     Current Facility-Administered Medications   Medication Dose Route Frequency    sodium chloride (NS) flush 5-10 mL  5-10 mL IntraVENous Q8H    sodium chloride (NS) flush 5-10 mL  5-10 mL IntraVENous PRN    HYDROcodone-acetaminophen (NORCO) 5-325 mg per tablet 1 Tab  1 Tab Oral Q4H PRN    naloxone (NARCAN) injection 0.4 mg  0.4 mg IntraVENous PRN    morphine injection 2 mg  2 mg IntraVENous Q4H PRN    ceFAZolin (ANCEF) 2 g/20 mL in sterile water IV syringe  2 g IntraVENous Q8H    levETIRAcetam (KEPPRA) 1,000 mg in 0.9% sodium chloride 100 mL IVPB  1,000 mg IntraVENous Q12H    ondansetron (ZOFRAN) injection 4 mg  4 mg IntraVENous Q4H PRN    niCARdipine (CARDENE) 25 mg in 0.9% sodium chloride 250 mL infusion  5-15 mg/hr IntraVENous TITRATE    docusate sodium (COLACE) capsule 100 mg  100 mg Oral BID    0.45% sodium chloride with KCl 20 mEq/L infusion   IntraVENous CONTINUOUS    dexamethasone (DECADRON) tablet 4 mg  4 mg Oral Q6H    sodium chloride (NS) flush 10-30 mL  10-30 mL InterCATHeter PRN    sodium chloride (NS) flush 10-40 mL  10-40 mL InterCATHeter Q8H    alteplase (CATHFLO) 1 mg in sterile water (preservative free) 1 mL injection  1 mg InterCATHeter PRN    sodium chloride (NS) flush 5-10 mL  5-10 mL IntraVENous Q8H    sodium chloride (NS) flush 5-10 mL  5-10 mL IntraVENous PRN    0.9% sodium chloride infusion  75 mL/hr IntraVENous CONTINUOUS    famotidine (PF) (PEPCID) 20 mg in sodium chloride 10 mL injection  20 mg IntraVENous Q12H    cholecalciferol (VITAMIN D3) tablet 4,000 Units  4,000 Units Oral DAILY    ascorbic acid (vitamin C) (VITAMIN C) tablet 250 mg  250 mg Oral DAILY    therapeutic multivitamin (THERAGRAN) tablet 1 Tab  1 Tab Oral DAILY    acetaminophen (TYLENOL) tablet 650 mg  650 mg Oral Q4H PRN      Allergies   Allergen Reactions    Pcn [Penicillins] Anaphylaxis    Contrast Agent [Iodine] Shortness of Breath and Itching     Patient reported during phone conversation on 4/18/17. Review of Systems: A complete review of systems was obtained, negative except as described above. Physical Exam:     Visit Vitals    BP 98/65    Pulse 99    Temp 98.6 °F (37 °C)    Resp 23    Ht 5' 7\" (1.702 m)    Wt 147 lb 4.8 oz (66.8 kg)    SpO2 99%    Breastfeeding No    BMI 23.07 kg/m2     ECOG PS: 0  General: No distress  Eyes: PERRLA, anicteric sclerae  HENT: dressing CDI to left posterior scalp  Neck: Supple; no thyromegaly   Lymphatic: No cervical, supraclavicular, axillary adenopathy  Respiratory: CTAB, normal respiratory effort  CV: Normal rate, regular rhythm, no murmurs, no peripheral edema  GI: Soft, nontender, nondistended, no masses, no hepatomegaly, no splenomegaly  MS: Unable to assess gait. Digits without clubbing or cyanosis. Skin: No rashes, ecchymoses, or petechiae. Normal temperature, turgor, and texture. Neuro/Psych: Alert, oriented, appropriate affect, normal judgment/insight      Results:     Lab Results   Component Value Date/Time    WBC 14.0 (H) 02/20/2018 02:45 AM    HGB 12.5 02/20/2018 02:45 AM    HCT 36.8 02/20/2018 02:45 AM    PLATELET 549 10/08/5305 02:45 AM    MCV 94.1 02/20/2018 02:45 AM    ABS. NEUTROPHILS 11.6 (H) 02/20/2018 02:45 AM     Lab Results   Component Value Date/Time    Sodium 139 02/20/2018 02:45 AM    Potassium 4.1 02/20/2018 02:45 AM    Chloride 104 02/20/2018 02:45 AM    CO2 26 02/20/2018 02:45 AM    Glucose 99 02/20/2018 02:45 AM    BUN 14 02/20/2018 02:45 AM    Creatinine 0.70 02/20/2018 02:45 AM    GFR est AA >60 02/20/2018 02:45 AM    GFR est non-AA >60 02/20/2018 02:45 AM    Calcium 9.4 02/20/2018 02:45 AM     Lab Results   Component Value Date/Time    Bilirubin, total 0.3 02/18/2018 03:42 AM    ALT (SGPT) 59 02/18/2018 03:42 AM    AST (SGOT) 58 (H) 02/18/2018 03:42 AM    Alk.  phosphatase 147 (H) 02/18/2018 03:42 AM    Protein, total 6.5 02/18/2018 03:42 AM    Albumin 3.2 (L) 02/18/2018 03:42 AM    Globulin 3.3 02/18/2018 03:42 AM     Brain MRI without contrast 2/16/18: IMPRESSION: 1.3 cm left posterior temporal lobe mass with surrounding vasogenic  edema likely represents a metastases. No other lesions are identified. If  patient is willing, the patient could return for contrast-enhanced sequences. Brain MRI with contrast 2/18/18  IMPRESSION:  Left and right temporal lobe ring-enhancing masses, most consistent  with metastatic disease.     CT C/A/P 2/18/18  IMPRESSION:   1. Nonspecific reticular nodular densities throughout the lungs may reflect  infectious or inflammatory process although metastatic neoplastic disease is not  excluded.     2. Stable bony metastases status post left mastectomy and left implant  reconstruction.     3. No other sites of suspected metastatic disease identified within the thorax,  abdomen, or pelvis.       Assessment and Recommendations:   Dean Alvarez is a 62 y.o. female with metastatic breast cancer admitted with new brain mass. 1. Metastatic breast cancer: Was originally Stage IIB, hormone receptor positive, Her2 negative at diagnosis in 2004 and treated with left mastectomy and adjuvant chemotherapy, no radiation. She was treated with adjuvant endocrine therapy, but was diagnosed recurrence in Nov 2010, now with Her2 positive and ER/HI positive disease. Has been off of treatment with Kadcyla since Oct 2017 per patient request. CT C/A/P revealed stable bony metastasis and no other sites of suspected metastatic disease in the thorax, abdomen, or pelvis. We will discuss systemic chemotherapy further once she has completely recovered. 2. Left temporal brain mass: Most likely 2/2 metastatic breast cancer. POD#1 surgical resection with Dr. Asad Saunders and recovering as expected. Continue keppra and taper decadron per NSG. 3. Right temporal lobe mass: Most likely 2/2 metastatic breast cancer. She will need to be set up with Dr. Jadon Pa outpatient for gamma knife.     4. Transaminitis: Mild. CT abdomen/pelvis without evidence of disease in the liver. We will continue to follow and support while she is here. Please call with any questions. Pt seen today in conjunction with NICK Vega  Pt doing well post neurosurgery. Case d/w Dr Radha Segura last night.      Signed By: Nikita Spain DO     February 20, 2018

## 2018-02-20 NOTE — PROGRESS NOTES
Excellent recovery  No focal deficits    Incision clean and dry  Transfer to floor today likely discharge tomorrow

## 2018-02-20 NOTE — PROGRESS NOTES
Problem: Falls - Risk of  Goal: *Absence of Falls  Document Darin Fall Risk and appropriate interventions in the flowsheet.    Outcome: Progressing Towards Goal  Fall Risk Interventions:            Medication Interventions: Assess postural VS orthostatic hypotension, Evaluate medications/consider consulting pharmacy, Patient to call before getting OOB, Teach patient to arise slowly    Elimination Interventions: Call light in reach, Patient to call for help with toileting needs, Toilet paper/wipes in reach, Toileting schedule/hourly rounds

## 2018-02-21 NOTE — ROUTINE PROCESS
1930:  Bedside and Verbal shift change report given to Vonnie Simms RN (oncoming nurse) by Sultana Stewart RN (offgoing nurse). Report included the following information SBAR, Kardex, Procedure Summary, Intake/Output, MAR, Accordion, Recent Results, Med Rec Status and Cardiac Rhythm sinus rhythm .

## 2018-02-21 NOTE — DISCHARGE INSTRUCTIONS
Discharge Instructions       PATIENT ID: Thierno Love  MRN: [id number]   YOB: 1960    DATE OF ADMISSION: 2/16/2018  8:07 PM    DATE OF DISCHARGE: 2/21/2018    PRIMARY CARE PROVIDER: Gary Bal MD     ATTENDING PHYSICIAN: Reta Lynne MD  DISCHARGING PROVIDER: Reta Lynne MD    To contact this individual call [phone] and ask the  to page. If unavailable ask to be transferred the Adult Hospitalist Department. DISCHARGE DIAGNOSES Breast ca with brain mets    CONSULTATIONS: IP CONSULT TO NEUROSURGERY  IP CONSULT TO HEMATOLOGY    PROCEDURES/SURGERIES: Procedure(s):  LEFT TEMPORAL CRANIOTOMY FOR TUMOR REMOVAL USING BRAIN LAB GUIDANCE    PENDING TEST RESULTS:   At the time of discharge the following test results are still pending:     FOLLOW UP APPOINTMENTS:   Follow-up Information     Follow up With Details Comments Contact Info    Gary Bal MD In 2 weeks  [address] [facility] [address]  [address] Mike Velasquez MD In 1 week  [address] [facility] [address] [facility]      Lucendia Councilman, DO In 2 weeks  [address] [facility] [address] [place] [address]      Faisal Tsang MD  as instructed [address] [address] [facility]  [phone]             ADDITIONAL CARE RECOMMENDATIONS:     DIET: Regular Diet    ACTIVITY: Activity as tolerated    WOUND CARE:     EQUIPMENT needed:       DISCHARGE MEDICATIONS:   See Medication Reconciliation Form    · It is important that you take the medication exactly as they are prescribed. · Keep your medication in the bottles provided by the pharmacist and keep a list of the medication names, dosages, and times to be taken in your wallet. · Do not take other medications without consulting your doctor. NOTIFY YOUR PHYSICIAN FOR ANY OF THE FOLLOWING:   Fever over 101 degrees for 24 hours.    Chest pain, shortness of breath, fever, chills, nausea, vomiting, diarrhea, change in mentation, falling, weakness, bleeding. Severe pain or pain not relieved by medications. Or, any other signs or symptoms that you may have questions about.       DISPOSITION:  x  Home With:   OT  PT  HH  RN       SNF/Inpatient Rehab/LTAC    Independent/assisted living    Hospice    Other:     CDMP Checked:   Yes x     PROBLEM LIST Updated:  Yes x       Signed:   Malka Samuels MD  2/21/2018  10:17 AM

## 2018-02-21 NOTE — DISCHARGE SUMMARY
Discharge Summary       PATIENT ID: Lia Marie  MRN: [id number]   YOB: 1960    DATE OF ADMISSION: 2/16/2018  8:07 PM    DATE OF DISCHARGE: 2/21/18   PRIMARY CARE PROVIDER: Juliet Griggs MD     ATTENDING PHYSICIAN: Khadijah Arias  DISCHARGING PROVIDER: Bradley Jang MD    To contact this individual call [phone] and ask the  to page. If unavailable ask to be transferred the Adult Hospitalist Department. CONSULTATIONS: IP CONSULT TO NEUROSURGERY  IP CONSULT TO HEMATOLOGY    PROCEDURES/SURGERIES: Procedure(s):  LEFT TEMPORAL CRANIOTOMY FOR TUMOR REMOVAL USING BRAIN LAB GUIDANCE    ADMITTING DIAGNOSES & HOSPITAL COURSE:   63 yo female with left Breast Ca with Mets to bone, admitted for headache. Pt completed Chemo on 11/2017. Pt states she was having intermittent HAs like migraines and also visual changes.  Pt had MRI as outpatient which showed small left temporal lobe lesion with edema.         Assessment & Plan:      Left temporal lobe mass with cerebral oedema likely metastatic breast ca  - c/w Decadron 4 mg BID for 2 weeks further as per NIRALI  - Keppra for seizure prophylaxis cont 1000 mg bid   - S/P resection by NSGY , followed by Focused radiation tx by Isaac Jaquez as outpatient.      Left Breast Ca with mets to bone and now likely brain  - s/p completion of Chemo 11/2017  - oncology evaluated plan per oncology  - CT chest/abd/pel noted  - follow up as scheduled               PENDING TEST RESULTS:   At the time of discharge the following test results are still pending:     FOLLOW UP APPOINTMENTS:    Follow-up Information     Follow up With Details Comments Contact Info    Juliet Griggs MD In 2 weeks  [address] [facility] [address]  101[address] Mike Velasquez MD In 3 week  [address] [facility] [address] [facility]      Lafrances Harada, DO In 2 weeks  [facility] [address] [facility] [address] Ericka Henderson MD  as instructed [address]  [address] [facility] [address] [facility]             ADDITIONAL CARE RECOMMENDATIONS:     DIET: Regular Diet  ACTIVITY: Activity as tolerated    WOUND CARE:     EQUIPMENT needed:       DISCHARGE MEDICATIONS:  Current Discharge Medication List      START taking these medications    Details   levETIRAcetam (KEPPRA) 500 mg tablet Take 2 Tabs by mouth two (2) times a day for 30 days. Qty: 120 Tab, Refills: 0      dexamethasone (DECADRON) 4 mg tablet Take 1 Tab by mouth two (2) times daily (with meals) for 15 days. Qty: 30 Tab, Refills: 0         CONTINUE these medications which have NOT CHANGED    Details   naltrexone (DEPADE) 50 mg tablet Take 4.5 mg by mouth daily. !! OTHER Green foods complex. 2 tabs daily      !! OTHER CBD oil, take 40 mg at bedtime      !! OTHER Indications: Organic Sulfa- Powder mix with liquids  , 4x per day      ascorbic acid, vitamin C, (VITAMIN C) 250 mg tablet Take 250 mg by mouth daily. MILK THISTLE, BULK, by Does Not Apply route. IODINE PO Take  by mouth. BETA 1,3 GLUCAN, BULK, 3 Tabs by Does Not Apply route daily. OTHER,NON-FORMULARY, Mushroom extract twice daily; Crocifurous extract      TURMERIC, BULK, by Does Not Apply route daily. cholecalciferol (VITAMIN D3) 1,000 unit tablet Take 4,000 Units by mouth daily. multivitamin (ONE A DAY) tablet Take 1 tablet by mouth daily. coenzyme q10 (CO Q-10) 10 mg cap Take  by mouth daily. LACTOBAC CMB #3/FOS/PANTETHINE (PROBIOTIC & ACIDOPHILUS PO) Take  by mouth daily. magnesium 250 mg tab Take 500 mg by mouth daily. !! - Potential duplicate medications found. Please discuss with provider. NOTIFY YOUR PHYSICIAN FOR ANY OF THE FOLLOWING:   Fever over 101 degrees for 24 hours. Chest pain, shortness of breath, fever, chills, nausea, vomiting, diarrhea, change in mentation, falling, weakness, bleeding.  Severe pain or pain not relieved by medications. Or, any other signs or symptoms that you may have questions about. DISPOSITION:  x  Home With:   OT  PT  HH  RN       Long term SNF/Inpatient Rehab    Independent/assisted living    Hospice    Other:       PATIENT CONDITION AT DISCHARGE:     Functional status    Poor    x Deconditioned     Independent      Cognition    x Lucid     Forgetful     Dementia      Catheters/lines (plus indication)    Fin[place]     PICC     PEG    x None      Code status   x  Full code     DNR      PHYSICAL EXAMINATION AT DISCHARGE:     Constitutional:  No acute distress, cooperative, pleasant    ENT:  Oral mucous moist   Resp:  CTA bilaterall   CV:  Regular rhythm, normal     GI:  Soft, non distended, non tender.  normoactive bowel sounds    Musculoskeletal:  No edema, warm, 2+ pulses throughout    Neurologic:  alert and awake, PERRLA, EOMI, motor 5/5, no drift, clear speech                                             Skin:  Good turgor, no rashes or ulcers        CHRONIC MEDICAL DIAGNOSES:  Problem List as of 2/21/2018  Date Reviewed: 2/21/2018          Codes Class Noted - Resolved    * (Principal)Brain mass ICD-10-CM: G93.9  ICD-9-CM: 348.9  2/16/2018 - Present        Nonintractable episodic headache ICD-10-CM: R51  ICD-9-CM: 784.0  2/6/2018 - Present        Chemotherapy follow-up examination ICD-10-CM: G55  ICD-9-CM: V67.2  7/23/2017 - Present        Fatigue ICD-10-CM: R53.83  ICD-9-CM: 780.79  6/19/2017 - Present        Anxiety about health ICD-10-CM: F41.8  ICD-9-CM: 300.09  5/29/2017 - Present        Alternative medicine ICD-10-CM: Z51.89  ICD-9-CM: V58.9  5/29/2017 - Present        Left-sided thoracic back pain ICD-10-CM: M54.6  ICD-9-CM: 724.1  11/28/2016 - Present        Bone metastases ([facility] [address] ICD-10-CM: C79.51  ICD-9-CM: 198.5  11/28/2016 - Present        Liver metastases ([facility] ICD-10-CM: C78.7  ICD-9-CM: 197.7  11/28/2016 - Present        Breast cancer, stage 4 ([facility] ICD-10-CM: C50.919  ICD-9-CM: 174.9  10/20/2014 - Present              Greater than 30  minutes were spent with the patient on counseling and coordination of care    Signed:   Isaac Menard MD  2/21/2018  10:20 AM

## 2018-03-08 NOTE — PROGRESS NOTES
Jimena Willett is a 62 y.o. female here today for follow up of breast cancer. States she is having gamma knife surgery on 3/21/18.

## 2018-03-08 NOTE — PROGRESS NOTES
[facility] DISTRICT  Medical Oncology at [facility]    Hematology / Oncology Progress Note    Reason for Visit:   Andreas Marquez is a 62 y.o. female who is seen in office f/u for metastatic breast cancer to brain. Diagnosis: Metastatic breast cancer to liver and bone, now brain    TREATMENT COURSE: kadcyla started 6/17 and stopped 10/17.    4/2004, s/p L mastectomy with Dr. Krystina Henry at [facility]. Stage IIB, T2N1M0, 3/39 LN +, ER negative, NV +, HER 2 +, was enrolled on NSABP B-31 trial with adriamycin/cytoxan q 3 weeks x 4 followed by taxol weekly with herceptin x 12 then completion of a year of herceptin. States she did not receive XRT. Did receive 5 years of tamoxifen from 5220-6484, which she tolerated well. In 2009 she noticed a chest wall mass. 11/23/10 FNA of chest wall mass showed ER + > 90%, NV + > 90%, MIB 50%, HER 2 + by FISH ratio 6.29. S/p BSO 12/27/10. Received zometa from 2010 to 2011. In 2011, she was started on q 3 week trastuzumab and exemestane, but states she took the exemestane sparingly due to joint pains and body aches. Has not taken any AI since 2013. Briefly took anastrozole in 5/2014. Stopped anastrozole 1/1/15 due to continued body and joint aches. Started Herceptin 1/9/17, stopped 4/25/17 due to progressive disease noted on scans  Started Kadcyla 3.6 mg/kg every 21 days on 6/1/17, reduced to 3 mg/kg due to side effects on 6/26/17    History of Present Illness:   Ms. Trisha Kwan is a 61 y/o female with metastatic breast cancer ER+ HER2+ post hospital stay for new left temporal lobe brain mass. Pt is post neurosurgery. Did well. For gamma knife soon on 3//21/18  Here for discussion of systemic therapy. Pt does not want chemo yet and thinks she does not want kadcyla again. May just want herceptin.        Past Medical History:   Diagnosis Date    Breast CA ([facility] [address]     Metastatic cancer [facility])       Past Surgical History:   Procedure Laterality Date    BREAST SURGERY PROCEDURE UNLISTED 2004    mastectomy    HX GYN  2010    ovaries removed      Social History   Substance Use Topics    Smoking status: Never Smoker    Smokeless tobacco: Never Used    Alcohol use Yes      Comment: 3-4 drinks/month      Family History   Problem Relation Age of Onset    Hypertension Mother     Cancer Father      mesothelioma    Cancer Maternal Grandfather 76     Bladder     Current Outpatient Prescriptions   Medication Sig    ALPHA LIPOIC ACID PO Take  by mouth two (2) times a day.  OTHER 1 Cap daily. Vitamin B 17    levETIRAcetam (KEPPRA) 500 mg tablet Take 2 Tabs by mouth two (2) times a day for 30 days.  naltrexone (DEPADE) 50 mg tablet Take 4.5 mg by mouth daily.  OTHER Green foods complex. 2 tabs daily    OTHER CBD oil, take 40 mg at bedtime    OTHER Indications: Organic Sulfa- Powder mix with liquids  , 4x per day    ascorbic acid, vitamin C, (VITAMIN C) 250 mg tablet Take 250 mg by mouth daily.  MILK THISTLE, BULK, by Does Not Apply route.  IODINE PO Take  by mouth.  BETA 1,3 GLUCAN, BULK, 3 Tabs by Does Not Apply route daily.  OTHER,NON-FORMULARY, Mushroom extract twice daily; Crocifurous extract    TURMERIC, BULK, by Does Not Apply route daily.  cholecalciferol (VITAMIN D3) 1,000 unit tablet Take 4,000 Units by mouth daily.  multivitamin (ONE A DAY) tablet Take 1 tablet by mouth daily.  coenzyme q10 (CO Q-10) 10 mg cap Take  by mouth daily.  LACTOBAC CMB #3/FOS/PANTETHINE (PROBIOTIC & ACIDOPHILUS PO) Take  by mouth daily.  magnesium 250 mg tab Take 500 mg by mouth daily.  dexamethasone (DECADRON) 4 mg tablet Take 1 Tab by mouth two (2) times daily (with meals) for 15 days. No current facility-administered medications for this visit. Allergies   Allergen Reactions    Pcn [Penicillins] Anaphylaxis    Contrast Agent [Iodine] Shortness of Breath and Itching     Patient reported during phone conversation on 4/18/17.       Review of Systems: A complete review of systems was obtained, negative except as described above. Physical Exam:     Visit Vitals    BP 94/66    Pulse 85    Temp 97.7 °F (36.5 °C) (Oral)    Resp 16    Ht 5' 7\" (1.702 m)    Wt 147 lb 12.8 oz (67 kg)    SpO2 97%    BMI 23.15 kg/m2     ECOG PS: 0  General: No distress  Eyes: PERRLA, anicteric sclerae  HENT:  Brain surgery scar  Neck: Supple  Respiratory: CTAB, normal respiratory effort  CV: Normal rate, regular rhythm, no murmurs, no peripheral edema  GI: Soft, nontender, nondistended, no masses, no hepatomegaly, no splenomegaly  MS: equal strength ambulatory  Skin: No rashes, ecchymoses, or petechiae. Normal temperature, turgor, and texture. Neuro/Psych: Alert, oriented, appropriate affect, normal judgment/insight      Results:     Lab Results   Component Value Date/Time    WBC 14.0 (H) 02/20/2018 02:45 AM    HGB 12.5 02/20/2018 02:45 AM    HCT 36.8 02/20/2018 02:45 AM    PLATELET 044 76/79/7651 02:45 AM    MCV 94.1 02/20/2018 02:45 AM    ABS. NEUTROPHILS 11.6 (H) 02/20/2018 02:45 AM    Hemoglobin (POC) 12.0 02/19/2018 02:56 PM     Lab Results   Component Value Date/Time    Sodium 139 02/20/2018 02:45 AM    Potassium 4.1 02/20/2018 02:45 AM    Chloride 104 02/20/2018 02:45 AM    CO2 26 02/20/2018 02:45 AM    Glucose 99 02/20/2018 02:45 AM    BUN 14 02/20/2018 02:45 AM    Creatinine 0.70 02/20/2018 02:45 AM    GFR est AA >60 02/20/2018 02:45 AM    GFR est non-AA >60 02/20/2018 02:45 AM    Calcium 9.4 02/20/2018 02:45 AM     Lab Results   Component Value Date/Time    Bilirubin, total 0.3 02/18/2018 03:42 AM    ALT (SGPT) 59 02/18/2018 03:42 AM    AST (SGOT) 58 (H) 02/18/2018 03:42 AM    Alk. phosphatase 147 (H) 02/18/2018 03:42 AM    Protein, total 6.5 02/18/2018 03:42 AM    Albumin 3.2 (L) 02/18/2018 03:42 AM    Globulin 3.3 02/18/2018 03:42 AM     Brain MRI without contrast 2/16/18:    IMPRESSION: 1.3 cm left posterior temporal lobe mass with surrounding vasogenic  edema likely represents a metastases. No other lesions are identified. If  patient is willing, the patient could return for contrast-enhanced sequences. Brain MRI with contrast 2/18/18  IMPRESSION:  Left and right temporal lobe ring-enhancing masses, most consistent  with metastatic disease.     CT C/A/P 2/18/18  IMPRESSION:   1. Nonspecific reticular nodular densities throughout the lungs may reflect  infectious or inflammatory process although metastatic neoplastic disease is not  excluded.     2. Stable bony metastases status post left mastectomy and left implant  reconstruction.     3. No other sites of suspected metastatic disease identified within the thorax,  abdomen, or pelvis.       Assessment and Recommendations:   Dmitri James is a 62 y.o. female with metastatic breast cancer admitted with new brain mass. 1. Metastatic breast cancer ER positive, Her2 +   initially treated with left mastectomy and adjuvant chemotherapy, no radiation. She was treated with adjuvant endocrine therapy, but was diagnosed recurrence in Nov 2010 with Her2 positive and ER/NJ positive disease. Has been off of treatment with Kadcyla since Oct 2017 per patient request.     Pt recently in hospital with brain mets post neurosurgery. Did well. Gamma knife for 3/21/18. CT C/A/P revealed stable bony metastasis and nodular opacities in lungs ? Inflammatory. Reviewed hospital stay and new brain mets. Pt clinically is doing well overall. Discussed options for further Her2 based systemic therapy. Reviewed tykerb/ xeloda vs herceptin/ perjeta/ taxane vs kadcyla again. Pt initially said she wants herceptin only but she had progressive disease on this. Would recommend tykerb/ xeloda and pt will consider this. Pt wants to wait until after gamma knife and then a few weeks until she starts any systemic therapy. Daughter here with pt today and very supportive. Will check labs with next visit.      2. Right temporal lobe met metastatic breast cancer. Resection/ gamma knife in a few weeks. 3. occ SOB. Need to monitor with nonspecific CT findings in lungs. 4.  Daughter here for support today. Pt's mood is good and PS 0-1. F/u here in 2 weeks. Call if questions.       Signed By: Jose Maria Larose DO     March 8, 2018

## 2018-03-08 NOTE — MR AVS SNAPSHOT
[address] [facility] [address] [place] [address] 
[phone] Patient: Lars Singer MRN: [id number] :1960 Visit Information Date & Time Provider Department Dept. Phone Encounter #  
 3/8/2018  2:30 PM Iglesia Neal 15[facility] Oncology at [facility] [id number] Follow-up Instructions Return in about 4 weeks (around 2018). Your Appointments 2018 12:00 PM  
Follow Up with Poli Emmanuel  [facility] Oncology at [facility]) Appt Note: 4wk fu (breast ca) [address] [facility] [address]  
[phone]  
  
   
 [address] [address] [facility] [address] Upcoming Health Maintenance Date Due Hepatitis C Screening 1960 Pneumococcal 19-64 Highest Risk (1 of 3 - PCV13) 1979 DTaP/Tdap/Td series (1 - Tdap) 1981 PAP AKA CERVICAL CYTOLOGY 1981 BREAST CANCER SCRN MAMMOGRAM 2010 FOBT Q 1 YEAR AGE 50-75 2010 Influenza Age 5 to Adult 2017 Allergies as of 3/8/2018  Review Complete On: 3/8/2018 By: Poli Emmanuel DO Severity Noted Reaction Type Reactions Pcn [Penicillins] High 2014    Anaphylaxis Contrast Agent [Iodine]  2017    Shortness of Breath, Itching Patient reported during phone conversation on 17. Current Immunizations  Reviewed on 3/8/2018 No immunizations on file. Reviewed by Genesis Pearce LPN on 3/8/6704 at  4:42 PM  
 Reviewed by Genesis Pearce LPN on 3177 at  3:23 PM  
You Were Diagnosed With   
  
 Codes Comments Malignant neoplasm of left breast, stage 4 ([facility] [address]    -  Primary ICD-10-CM: M47.180 ICD-9-CM: 174.9 Vitals BP Pulse Temp Resp Height(growth percentile) Weight(growth percentile) 94/66 85 97.7 °F (36.5 °C) (Oral) 16 5' 7\" (1.702 m) 147 lb 12.8 oz (67 kg) SpO2 BMI OB Status Smoking Status 97% 23.15 kg/m2 Medically Induced Never Smoker Vitals History BMI and BSA Data Body Mass Index Body Surface Area  
 23.15 kg/m 2 1.78 m 2 Preferred Pharmacy Pharmacy Name Phone [facility] [address]  [facility] [phone] Your Updated Medication List  
  
   
This list is accurate as of 3/8/18  3:01 PM.  Always use your most recent med list.  
  
  
  
  
 ALPHA LIPOIC ACID PO Take  by mouth two (2) times a day. BETA 1,3 GLUCAN (BULK) 3 Tabs by Does Not Apply route daily. cholecalciferol 1,000 unit tablet Commonly known as:  VITAMIN D3 Take 4,000 Units by mouth daily. CO Q-10 10 mg Cap Generic drug:  coenzyme q10 Take  by mouth daily. dexamethasone 4 mg tablet Commonly known as:  DECADRON Take 1 Tab by mouth two (2) times daily (with meals) for 15 days. IODINE PO Take  by mouth.  
  
 levETIRAcetam 500 mg tablet Commonly known as:  KEPPRA Take 2 Tabs by mouth two (2) times a day for 30 days. magnesium 250 mg Tab Take 500 mg by mouth daily. MILK THISTLE (BULK)  
by Does Not Apply route. multivitamin tablet Commonly known as:  ONE A DAY Take 1 tablet by mouth daily. naltrexone 50 mg tablet Commonly known as:  DEPADE Take 4.5 mg by mouth daily. * OTHER Indications: Organic Sulfa- Powder mix with liquids  , 4x per day * OTHER  
CBD oil, take 40 mg at bedtime OTHER Green foods complex. 2 tabs daily OTHER  
1 Cap daily. Vitamin B 17 OTHER(NON-FORMULARY) Mushroom extract twice daily; Crocifurous extract PROBIOTIC & ACIDOPHILUS PO Take  by mouth daily. TURMERIC (BULK)  
by Does Not Apply route daily. VITAMIN C 250 mg tablet Generic drug:  ascorbic acid (vitamin C) Take 250 mg by mouth daily. * Notice: This list has 2 medication(s) that are the same as other medications prescribed for you. Read the directions carefully, and ask your doctor or other care provider to review them with you. Follow-up Instructions Return in about 4 weeks (around 4/6/2018). Introducing [facility] SERVICES! Dear Mamie Meyers: Thank you for requesting a [employer] account. Our records indicate that you already have an active [employer] account. You can access your account anytime at [email or web address] Did you know that you can access your hospital and ER discharge instructions at any time in [employer]? You can also review all of your test results from your hospital stay or ER visit. Additional Information If you have questions, please visit the Frequently Asked Questions section of the [employer] website at [email or web address] Remember, [employer] is NOT to be used for urgent needs. For medical emergencies, dial 911. Now available from your iPhone and Android! Please provide this summary of care documentation to your next provider. Your primary care clinician is listed as Giovanna Gonzalez. If you have any questions after today's visit, please call [phone].

## 2018-03-09 NOTE — PROGRESS NOTES
Add on path for [id number] faxed complete to [facility] [place] [facility]. To [facility] for scanning.

## 2018-03-21 NOTE — PROGRESS NOTES
Case d/w dr Zuleyka Ball  Pt had gamma knife today  Dr Elana Yee is going to try to encourage pt to take tykerb/ xeloda as recommended here  We can f/u with pt

## 2018-04-06 PROBLEM — R06.02 SOB (SHORTNESS OF BREATH): Status: ACTIVE | Noted: 2018-01-01

## 2018-04-06 PROBLEM — C79.31 BRAIN METASTASIS (HCC): Status: ACTIVE | Noted: 2018-01-01

## 2018-04-06 PROBLEM — R05.9 COUGH: Status: ACTIVE | Noted: 2018-01-01

## 2018-04-06 NOTE — MR AVS SNAPSHOT
[address] [facility] [address] [place] [address] 
[phone] Patient: Stanislav Leal MRN: [id number] :1960 Visit Information Date & Time Provider Department Dept. Phone Encounter #  
 2018 12:00 PM Iglesia Gorman [facility] at [address] [id number] Follow-up Instructions Return in about 4 weeks (around 2018). Routing History Follow-up and Disposition History Your Appointments 5/3/2018  2:00 PM  
Any with Hal Sierra  [facility] at [facility] Appt Note: follow up breast CA  
 [address] [facility] [address] [address]  
[phone]  
  
   
 [address] [facility] [address] Upcoming Health Maintenance Date Due Hepatitis C Screening 1960 Pneumococcal 19-64 Highest Risk (1 of 3 - PCV13) 1979 DTaP/Tdap/Td series (1 - Tdap) 1981 PAP AKA CERVICAL CYTOLOGY 1981 BREAST CANCER SCRN MAMMOGRAM 2010 FOBT Q 1 YEAR AGE 50-75 2010 Influenza Age 5 to Adult 2017 Allergies as of 2018  Review Complete On: 2018 By: Hal Sierra DO Severity Noted Reaction Type Reactions Pcn [Penicillins] High 2014    Anaphylaxis Contrast Agent [Iodine]  2017    Shortness of Breath, Itching Patient reported during phone conversation on 17. Current Immunizations  Reviewed on 2018 No immunizations on file. Reviewed by John Barrow LPN on 7319 at 62:55 PM  
You Were Diagnosed With   
  
 Codes Comments Malignant neoplasm of left breast, stage 4 ([address]    -  Primary ICD-10-CM: P44.357 ICD-9-CM: 174.9 Liver metastases ([address]     ICD-10-CM: C78.7 ICD-9-CM: 197.7 Bone metastases ([address]     ICD-10-CM: C79.51 
ICD-9-CM: 198.5 Brain metastasis ([address]     ICD-10-CM: C79.31 
ICD-9-CM: 198.3 Neoplastic malignant related fatigue     ICD-10-CM: R53.0 ICD-9-CM: 780.79 SOB (shortness of breath)     ICD-10-CM: R06.02 
ICD-9-CM: 786.05 Cough     ICD-10-CM: R05 ICD-9-CM: 694. 2 Vitals BP Pulse Resp Height(growth percentile) Weight(growth percentile) SpO2  
 91/57 100 16 5' 7\" (1.702 m) 144 lb (65.3 kg) 98% BMI OB Status Smoking Status 22.55 kg/m2 Medically Induced Never Smoker Vitals History BMI and BSA Data Body Mass Index Body Surface Area  
 22.55 kg/m 2 1.76 m 2 Preferred Pharmacy Pharmacy Name Phone [facility] [address]  [facility] [phone] Your Updated Medication List  
  
   
This list is accurate as of 4/6/18  1:53 PM.  Always use your most recent med list.  
  
  
  
  
 ALPHA LIPOIC ACID PO Take  by mouth two (2) times a day. BETA 1,3 GLUCAN (BULK) 3 Tabs by Does Not Apply route daily. cholecalciferol 1,000 unit tablet Commonly known as:  VITAMIN D3 Take 4,000 Units by mouth daily. CO Q-10 10 mg Cap Generic drug:  coenzyme q10 Take  by mouth daily. IODINE PO Take  by mouth.  
  
 magnesium 250 mg Tab Take 500 mg by mouth daily. MILK THISTLE (BULK)  
by Does Not Apply route. multivitamin tablet Commonly known as:  ONE A DAY Take 1 tablet by mouth daily. naltrexone 50 mg tablet Commonly known as:  DEPADE Take 4.5 mg by mouth daily. * OTHER Indications: Organic Sulfa- Powder mix with liquids  , 4x per day * OTHER  
CBD oil, take 40 mg at bedtime OTHER Green foods complex. 2 tabs daily OTHER  
1 Cap daily. Vitamin B 17 OTHER(NON-FORMULARY) Mushroom extract twice daily; Crocifurous extract PROBIOTIC & ACIDOPHILUS PO Take  by mouth daily. TURMERIC (BULK)  
by Does Not Apply route daily. VITAMIN C 250 mg tablet Generic drug:  ascorbic acid (vitamin C) Take 250 mg by mouth daily. * Notice: This list has 2 medication(s) that are the same as other medications prescribed for you. Read the directions carefully, and ask your doctor or other care provider to review them with you. We Performed the Following CA 27.29 [28473 CPT(R)] CBC WITH AUTOMATED DIFF [44946 CPT(R)] METABOLIC PANEL, COMPREHENSIVE [84606 CPT(R)] Follow-up Instructions Return in about 4 weeks (around 5/4/2018). To-Do List   
 04/06/2018 Imaging:  CT ABD PELV W WO CONT   
  
 04/06/2018 Imaging:  CT CHEST W WO CONT Patient Instructions Capecitabine Jossemeghan Márquez) Capecitabine is a chemotherapy drug usually given to treat cancer of the colon, rectum, breast, stomach, pancreas and gullet (oesophagus). This information should ideally be read with our general information about chemotherapy and your type of cancer. On this page What capecitabine looks like How it is given Possible side effects Less common side effects Additional information Things to remember about capecitabine tablets References What capecitabine looks like Back to top Capecitabine is available as 500mg peach-coloured tablets and 150mg light peach-coloured tablets. How it is given Back to top Your doctor may want you to take a combination of 500mg and 150mg tablets. You need to make sure that you're taking the right dose and that you don't take too much or too little. The tablets should be swallowed whole with a glass of water. They should be taken within half an hour of finishing a meal as capecitabine works best if it's broken down in the stomach with food. You should take them in the morning just after breakfast, and then again just after your evening meal, so that the doses are spaced at least eight hours apart or, if possible, 1012 hours apart. If you have trouble swallowing capecitabine tablets, they can be dissolved in a 200ml glass of warm water. The mixture should be stirred with a spoon until the tablets are completely dissolved, and then drunk immediately. You can add some blackcurrant juice just before swallowing to help disguise the taste. The glass and spoon should be washed and kept separate from your other crockery and cooking utensils. Capecitabine tablets are usually taken twice daily for a number of days, followed by a rest period when no tablets are taken. Alternatively, they are taken twice daily (every day) with no rest period. This may vary depending on the type of cancer you have. It's important to follow the instructions carefully and take the tablets as directed by your doctor, chemotherapy nurse or pharmacist.  
Estephanieromi Quyen should only get the tablets from your hospital. You can't get a repeat prescription from your GP. Before you begin your treatment your doctor will arrange for you to have blood tests. Mike Flores usually be given anti-sickness drugs before and/or during your treatment. Possible side effects Back to top Each persons reaction to chemotherapy is different. Some people have very few side effects while others may experience more. The side effects described here won't affect everyone who is given capecitabine and may be different if you're being treated with more than one type of chemotherapy drug. We have outlined the most common side effects but haven't included those that are very rare and unlikely to affect you. If you notice any effects that are not listed here, discuss them with your doctor or nurse. Feeling sick (nausea) and being sick (vomiting) This is usually mild. Your doctor can prescribe very effective anti-sickness (anti-emetic) drugs to prevent or greatly reduce, nausea and vomiting.  If the sickness isn't controlled, or if it continues, tell your doctor; they can prescribe other anti-sickness drugs which may be more effective. Some anti-sickness drugs can cause constipation. Let your doctor or nurse know if this is a problem. Sore mouth and ulcers Your mouth may become sore, or you may notice small ulcers during this treatment. Drinking plenty of fluids, and cleaning your teeth regularly and gently with a soft toothbrush, can help reduce the risk of this happening. Some people may find sucking on ice soothing. Tell your nurse or doctor if you have any of these problems, as they can prescribe mouthwashes and medicine to prevent or clear mouth infections. Taste changes You may notice that food tastes different. Normal taste usually comes back after treatment finishes. A dietitian or specialist nurse at your hospital can give you advice about ways of coping with this side effect. Diarrhoea This can be severe but can usually be controlled with medicines. It's important to drink plenty of fluids and you may be given medicine to take. Follow the instructions and take it immediately. If you have diarrhoea more than 4-6 times a day, or if you have it at night, contact your specialist for advice immediately. You may be advised to stop taking capecitabine. However, once the diarrhoea has eased your doctor will tell you if you can restart the treatment and may recommend a lower dose. Abdominal pain and constipation Capecitabine can cause pain in your tummy (abdomen) and constipation. Let your doctor know if you develop pain. It can usually be controlled with mild painkillers. Constipation can usually be helped by drinking plenty of fluids, eating more fibre and doing some exercise. You may need to take medicine (laxatives) to help ease it. Your doctor can prescribe these or you can buy them at a pharmacy. Loss of appetite Some people lose their appetite while theyre having chemotherapy.  This can be mild and may only last a few days. If it doesnt improve you can ask to see a dietitian or specialist nurse at your hospital. They can give you advice on improving your appetite and keeping to a healthy weight. Skin changes Soreness and redness, or darkening of the palms of the hands and soles of the feet (sometimes known as palmar plantar or hand-foot syndrome) can occur. You may be prescribed vitamin B6 (pyridoxine), which can help to reduce this. A rash and dry or itchy skin may also occur. Let your specialist doctor or nurse know if you notice this. Tiredness (fatigue) You'll probably feel extremely tired. This is a very common side effect and it's important to get plenty of rest. Try to balance this with some gentle exercise, such as short walks, which will help. Abad Faroese probably get tired easily and feel fairly weak for several more months after your treatment. Pain in joints or muscles This may be caused by capecitabine. It's important to tell your doctor about this, so that they can prescribe painkillers to help. Less common side effects Back to top Risk of infection Capecitabine can reduce the number of white blood cells, which help fight infection. White blood cells are produced by the bone marrow. If the number of your white blood cells is low you will be more prone to infections. A low white blood cell count is called neutropenia. Neutropenia begins seven days after treatment, and your resistance to infection is usually at its lowest 1014 days after chemotherapy. The number of your white blood cells will then increase steadily and usually return to normal before your next cycle of chemotherapy is due. Contact your doctor or the hospital straight away if:  
your temperature goes above 38ºC (100. 4ºF) you suddenly feel unwell, even with a normal temperature.   
You'll have a blood test before having more chemotherapy to check the number of white blood cells. Occasionally, your treatment may need to be delayed if the number of your blood cells (blood count) is still low. Bruising and bleeding Capecitabine can reduce the production of platelets, which help the blood to clot. Tell your doctor if you have any unexplained bruising or bleeding, such as nosebleeds, bleeding gums, blood spots or rashes on the skin. You may need to have a platelet transfusion if your platelet count is low. Anaemia Capecitabine can reduce the number of red blood cells, which carry oxygen around the body. A low red blood cell count is called anaemia. This may make you feel tired and breathless. Tell your doctor or nurse if you have these symptoms. You may need to have a blood transfusion if the number of red blood cells becomes too low. Hair loss This is rare, but your hair may thin or occasionally fall out completely. If this happens, it usually begins about 34 weeks after starting treatment, although it may occur earlier. This is temporary and your hair will start to grow back once the treatment has finished. Your hair may grow back straighter, curlier, finer, or a slightly different colour than it was before. Your nurse can give you advice about coping with hair loss. Headaches Some people find that capecitabine causes headaches. Let your doctor or nurse know. They can give you painkillers to relieve this. Dizziness Some people may feel dizzy or light-headed. Tell your doctor if you have any of these side effects. Changes in the way your heart works Capecitabine may affect the way your heart works. Some people may experience chest pain and tightening across the centre of the chest while taking it. Chest pain can be caused by many different things other than chemotherapy. If you develop any of these symptoms, contact your doctor immediately. Increased production of tears This may be caused by capecitabine and is temporary. You may also notice that your eyes become sore and inflamed (conjunctivitis). Let your doctor know so they can prescribe soothing eye drops if necessary. Its important to let your doctor know straight away if you feel unwell or have any severe side effects, even if theyre not mentioned above. Additional information Back to top Risk of developing a blood clot Cancer can increase the risk of developing a blood clot (thrombosis), and chemotherapy may increase this risk further. A blood clot may cause symptoms such as pain, redness and swelling in a leg, or breathlessness and chest pain. Blood clots can be very serious so its important to tell your doctor straight away if you notice any of these symptoms. Most clots can be treated with drugs that thin the blood. The doctor or nurse can give you more information. Drug interactions Capecitabine interacts with the drugs phenytoin (Epanutin®), allopurinol (Zyloric®) and interferon (IntronA®, Roferon-A®). It may also affect the action of some medicines given to thin the blood (anti-coagulants), such as warfarin. Let your doctor know if you are taking any of these. You should also tell your doctor if youre taking folic acid because it might increase the side effects of capecitabine. Some other medicines, including those you can buy in a shop or , can be harmful to take when you are having chemotherapy. Tell your doctor about any medicines you're taking, including over-the-counter drugs, complementary therapies and herbal drugs. Fertility Your ability to become pregnant or father a child may be affected by having this treatment. It's important to discuss fertility with your doctor before starting treatment. Contraception It's not advisable to become pregnant or father a child while having capecitabine, as it may harm the developing baby.  Its important to use effective contraception while having this drug, and for at least a few months afterwards. You can discuss this with your doctor. Its not known whether chemotherapy drugs can be present in semen or vaginal fluids. To protect your partner, its safest to either avoid sex or use a barrier form of contraception for about 48 hours after chemotherapy. Breastfeeding There is a potential risk that chemotherapy drugs may be present in breast milk. Women are advised not to breastfeed during chemotherapy and for a few months afterwards. Non-cancer admission If youre admitted to hospital for a reason not related to the cancer, its important to tell the doctors and nurses looking after you that you're having chemotherapy treatment. You should tell them the name of your cancer specialist so that they can ask for advice. Emergency contacts Its a good idea to know who you should contact if you have any problems or troublesome side effects when youre at home. During office hours you can contact the clinic or horta where you had your treatment. Your chemotherapy nurse or doctor will tell you who to contact during the evening or at weekends. Things to remember about capecitabine tablets Back to top Its important to take your tablets at the right times as directed by your doctor. Always tell any doctors treating you for non-cancerous conditions that you're taking a course of chemotherapy tablets that should not be stopped or restarted without advice from your cancer specialist.  
Keep the tablets in their original packaging, and store them at room temperature away from heat and direct sunlight. Keep the tablets in a safe place and out of the reach of children. If your doctor decides to stop the treatment, return any remaining tablets to the pharmacist. Don't flush them down the toilet or throw them away.   
If you are sick just after taking the tablets tell your doctor as you may need to take another dose. Don't take another tablet without telling your doctor first.  
If you forget to take a tablet, don't take a double dose. Let your doctor know and keep to your regular dose schedule. References Back to top This section is based on our Capecitabine factsheet, which has been compiled using information from a number of reliable sources, including:  
Christiano Pineda: The Complete Drug Reference. 37th edition. 2011. [employer]. electronic Medicines Compendium. (accessed October 2011). iRewardChart. 62nd edition. 2011. [place] and [address] [place] [address]. Enrrique Ramey W Pollo Talley. The Chemotherapy Source Book. 4th edition. 2007. Farhana Macias and Ramya Henao. Content last reviewed: 1 December 2011 Next planned review: 2013 Introducing [facility]! Dear Carlota Hill: Thank you for requesting a [employer] account. Our records indicate that you already have an active [employer] account. You can access your account anytime at [email or web address] Did you know that you can access your hospital and ER discharge instructions at any time in [employer]? You can also review all of your test results from your hospital stay or ER visit. Additional Information If you have questions, please visit the Frequently Asked Questions section of the [employer] website at [email or web address] Remember, [employer] is NOT to be used for urgent needs. For medical emergencies, dial 911. Now available from your iPhone and Android! Please provide this summary of care documentation to your next provider. Your primary care clinician is listed as Karlyn Lesches. If you have any questions after today's visit, please call [phone].

## 2018-04-06 NOTE — PROGRESS NOTES
Chemo teach with patient re:  tykerb. xeloda. Patient stated read info sheets and has no questions. RN reviewed side effects/symptom management/lab draws and visit frequency. Discussed role of specialty pharmacy. All questions answered and consent for palliative therapy obtained. Support given. To provider for orders.

## 2018-04-06 NOTE — PATIENT INSTRUCTIONS
Capecitabine Clearance Sages)   Capecitabine is a chemotherapy drug usually given to treat cancer of the colon, rectum, breast, stomach, pancreas and gullet (oesophagus). This information should ideally be read with our general information about chemotherapy and your type of cancer. On this page     What capecitabine looks like   How it is given   Possible side effects   Less common side effects   Additional information   Things to remember about capecitabine tablets   References     What capecitabine looks like Back to top  Capecitabine is available as 500mg peach-coloured tablets and 150mg light peach-coloured tablets. How it is given Back to top  Your doctor may want you to take a combination of 500mg and 150mg tablets. You need to make sure that you're taking the right dose and that you don't take too much or too little. The tablets should be swallowed whole with a glass of water. They should be taken within half an hour of finishing a meal as capecitabine works best if it's broken down in the stomach with food. You should take them in the morning just after breakfast, and then again just after your evening meal, so that the doses are spaced at least eight hours apart or, if possible, 10-12 hours apart. If you have trouble swallowing capecitabine tablets, they can be dissolved in a 200ml glass of warm water. The mixture should be stirred with a spoon until the tablets are completely dissolved, and then drunk immediately. You can add some blackcurrant juice just before swallowing to help disguise the taste. The glass and spoon should be washed and kept separate from your other crockery and cooking utensils. Capecitabine tablets are usually taken twice daily for a number of days, followed by a rest period when no tablets are taken. Alternatively, they are taken twice daily (every day) with no rest period. This may vary depending on the type of cancer you have.  It's important to follow the instructions carefully and take the tablets as directed by your doctor, chemotherapy nurse or pharmacist.   Francisco Palmer should only get the tablets from your hospital. You can't get a repeat prescription from your GP. Before you begin your treatment your doctor will arrange for you to have blood tests. Yfn Eastwood usually be given anti-sickness drugs before and/or during your treatment. Possible side effects Back to top  Each persons reaction to chemotherapy is different. Some people have very few side effects while others may experience more. The side effects described here won't affect everyone who is given capecitabine and may be different if you're being treated with more than one type of chemotherapy drug. We have outlined the most common side effects but haven't included those that are very rare and unlikely to affect you. If you notice any effects that are not listed here, discuss them with your doctor or nurse. Feeling sick (nausea) and being sick (vomiting)   This is usually mild. Your doctor can prescribe very effective anti-sickness (anti-emetic) drugs to prevent or greatly reduce, nausea and vomiting. If the sickness isn't controlled, or if it continues, tell your doctor; they can prescribe other anti-sickness drugs which may be more effective. Some anti-sickness drugs can cause constipation. Let your doctor or nurse know if this is a problem. Sore mouth and ulcers   Your mouth may become sore, or you may notice small ulcers during this treatment. Drinking plenty of fluids, and cleaning your teeth regularly and gently with a soft toothbrush, can help reduce the risk of this happening. Some people may find sucking on ice soothing. Tell your nurse or doctor if you have any of these problems, as they can prescribe mouthwashes and medicine to prevent or clear mouth infections. Taste changes   You may notice that food tastes different. Normal taste usually comes back after treatment finishes.  A dietitian or specialist nurse at your hospital can give you advice about ways of coping with this side effect. Diarrhoea   This can be severe but can usually be controlled with medicines. It's important to drink plenty of fluids and you may be given medicine to take. Follow the instructions and take it immediately. If you have diarrhoea more than 4-6 times a day, or if you have it at night, contact your specialist for advice immediately. You may be advised to stop taking capecitabine. However, once the diarrhoea has eased your doctor will tell you if you can restart the treatment and may recommend a lower dose. Abdominal pain and constipation   Capecitabine can cause pain in your tummy (abdomen) and constipation. Let your doctor know if you develop pain. It can usually be controlled with mild painkillers. Constipation can usually be helped by drinking plenty of fluids, eating more fibre and doing some exercise. You may need to take medicine (laxatives) to help ease it. Your doctor can prescribe these or you can buy them at a pharmacy. Loss of appetite   Some people lose their appetite while theyre having chemotherapy. This can be mild and may only last a few days. If it doesnt improve you can ask to see a dietitian or specialist nurse at your hospital. They can give you advice on improving your appetite and keeping to a healthy weight. Skin changes   Soreness and redness, or darkening of the palms of the hands and soles of the feet (sometimes known as palmar plantar or hand-foot syndrome) can occur. You may be prescribed vitamin B6 (pyridoxine), which can help to reduce this. A rash and dry or itchy skin may also occur. Let your specialist doctor or nurse know if you notice this. Tiredness (fatigue)   You'll probably feel extremely tired.  This is a very common side effect and it's important to get plenty of rest. Try to balance this with some gentle exercise, such as short walks, which will help. Velasquez Cabello probably get tired easily and feel fairly weak for several more months after your treatment. Pain in joints or muscles   This may be caused by capecitabine. It's important to tell your doctor about this, so that they can prescribe painkillers to help. Less common side effects Back to top  Risk of infection   Capecitabine can reduce the number of white blood cells, which help fight infection. White blood cells are produced by the bone marrow. If the number of your white blood cells is low you will be more prone to infections. A low white blood cell count is called neutropenia. Neutropenia begins seven days after treatment, and your resistance to infection is usually at its lowest 10-14 days after chemotherapy. The number of your white blood cells will then increase steadily and usually return to normal before your next cycle of chemotherapy is due. Contact your doctor or the hospital straight away if:   your temperature goes above 38ºC (100. 4ºF)   you suddenly feel unwell, even with a normal temperature. You'll have a blood test before having more chemotherapy to check the number of white blood cells. Occasionally, your treatment may need to be delayed if the number of your blood cells (blood count) is still low. Bruising and bleeding   Capecitabine can reduce the production of platelets, which help the blood to clot. Tell your doctor if you have any unexplained bruising or bleeding, such as nosebleeds, bleeding gums, blood spots or rashes on the skin. You may need to have a platelet transfusion if your platelet count is low. Anaemia   Capecitabine can reduce the number of red blood cells, which carry oxygen around the body. A low red blood cell count is called anaemia. This may make you feel tired and breathless. Tell your doctor or nurse if you have these symptoms. You may need to have a blood transfusion if the number of red blood cells becomes too low.    Hair loss   This is rare, but your hair may thin or occasionally fall out completely. If this happens, it usually begins about 3-4 weeks after starting treatment, although it may occur earlier. This is temporary and your hair will start to grow back once the treatment has finished. Your hair may grow back straighter, curlier, finer, or a slightly different colour than it was before. Your nurse can give you advice about coping with hair loss. Headaches   Some people find that capecitabine causes headaches. Let your doctor or nurse know. They can give you painkillers to relieve this. Dizziness   Some people may feel dizzy or light-headed. Tell your doctor if you have any of these side effects. Changes in the way your heart works   Capecitabine may affect the way your heart works. Some people may experience chest pain and tightening across the centre of the chest while taking it. Chest pain can be caused by many different things other than chemotherapy. If you develop any of these symptoms, contact your doctor immediately. Increased production of tears   This may be caused by capecitabine and is temporary. You may also notice that your eyes become sore and inflamed (conjunctivitis). Let your doctor know so they can prescribe soothing eye drops if necessary. Its important to let your doctor know straight away if you feel unwell or have any severe side effects, even if theyre not mentioned above. Additional information Back to top  Risk of developing a blood clot   Cancer can increase the risk of developing a blood clot (thrombosis), and chemotherapy may increase this risk further. A blood clot may cause symptoms such as pain, redness and swelling in a leg, or breathlessness and chest pain. Blood clots can be very serious so its important to tell your doctor straight away if you notice any of these symptoms. Most clots can be treated with drugs that thin the blood. The doctor or nurse can give you more information.    Drug interactions   Capecitabine interacts with the drugs phenytoin (Epanutin®), allopurinol (Zyloric®) and interferon (IntronA®, Roferon-A®). It may also affect the action of some medicines given to thin the blood (anti-coagulants), such as warfarin. Let your doctor know if you are taking any of these. You should also tell your doctor if youre taking folic acid because it might increase the side effects of capecitabine. Some other medicines, including those you can buy in a shop or , can be harmful to take when you are having chemotherapy. Tell your doctor about any medicines you're taking, including over-the-counter drugs, complementary therapies and herbal drugs. Fertility   Your ability to become pregnant or father a child may be affected by having this treatment. It's important to discuss fertility with your doctor before starting treatment. Contraception   It's not advisable to become pregnant or father a child while having capecitabine, as it may harm the developing baby. Its important to use effective contraception while having this drug, and for at least a few months afterwards. You can discuss this with your doctor. Its not known whether chemotherapy drugs can be present in semen or vaginal fluids. To protect your partner, its safest to either avoid sex or use a barrier form of contraception for about 48 hours after chemotherapy. Breastfeeding   There is a potential risk that chemotherapy drugs may be present in breast milk. Women are advised not to breastfeed during chemotherapy and for a few months afterwards. Non-cancer admission   If youre admitted to hospital for a reason not related to the cancer, its important to tell the doctors and nurses looking after you that you're having chemotherapy treatment. You should tell them the name of your cancer specialist so that they can ask for advice.    Emergency contacts   Its a good idea to know who you should contact if you have any problems or troublesome side effects when youre at home. During office hours you can contact the clinic or [facility]orta where you had your treatment. Your chemotherapy nurse or doctor will tell you who to contact during the evening or at weekends. Things to remember about capecitabine tablets Back to top  Its important to take your tablets at the right times as directed by your doctor. Always tell any doctors treating you for non-cancerous conditions that you're taking a course of chemotherapy tablets that should not be stopped or restarted without advice from your cancer specialist.   Keep the tablets in their original packaging, and store them at room temperature away from heat and direct sunlight. Keep the tablets in a safe place and out of the reach of children. If your doctor decides to stop the treatment, return any remaining tablets to the pharmacist. Don't flush them down the toilet or throw them away. If you are sick just after taking the tablets tell your doctor as you may need to take another dose. Don't take another tablet without telling your doctor first.   If you forget to take a tablet, don't take a double dose. Let your doctor know and keep to your regular dose schedule. References Back to top  This section is based on our Capecitabine factsheet, which has been compiled using information from a number of reliable sources, including:   Terri Tovar: The Complete Drug Reference. 37th edition. 2011. [employer]. electronic Medicines Compendium. (accessed October 2011). [place]. 62nd edition. 2011. [employer] [address]. Severiano Griffiths [address]. The Chemotherapy Source Book. 4th edition. 2007. Fawn Peres and Jagdeep Brunner.    Content last reviewed: 1 December 2011  Next planned review: 2013

## 2018-04-06 NOTE — PROGRESS NOTES
[facility] DISTRICT  Medical Oncology at [facility]    Hematology / Oncology Progress Note    Reason for Visit:   Amanda Medina is a 62 y.o. female who is seen in office f/u for metastatic breast cancer to brain. Diagnosis: Metastatic breast cancer to liver and bone, now brain    TREATMENT COURSE: kadcyla started 6/17 and stopped 10/17.    4/2004, s/p L mastectomy with Dr. Chelsey Calderon at [facility]. Stage IIB, T2N1M0, 3/39 LN +, ER negative, MT +, HER 2 +, was enrolled on NSABP B-31 trial with adriamycin/cytoxan q 3 weeks x 4 followed by taxol weekly with herceptin x 12 then completion of a year of herceptin. States she did not receive XRT. Did receive 5 years of tamoxifen from 8993-5328, which she tolerated well. In 2009 she noticed a chest wall mass. 11/23/10 FNA of chest wall mass showed ER + > 90%, MT + > 90%, MIB 50%, HER 2 + by FISH ratio 6.29. S/p BSO 12/27/10. Received zometa from 2010 to 2011. In 2011, she was started on q 3 week trastuzumab and exemestane, but states she took the exemestane sparingly due to joint pains and body aches. Has not taken any AI since 2013. Briefly took anastrozole in 5/2014. Stopped anastrozole 1/1/15 due to continued body and joint aches. Started Herceptin 1/9/17, stopped 4/25/17 due to progressive disease noted on scans  Started Kadcyla 3.6 mg/kg every 21 days on 6/1/17, reduced to 3 mg/kg due to side effects on 6/26/17      History of Present Illness:   Ms. Richard Hernandez is a 61 y/o female with metastatic breast cancer ER+ HER2+ post left temporal lobe brain met resection and gamma knife 3//21/18  Pt has not been on any systemic therapy by choice. Here for discussion of systemic therapy. Pt did not want chemo last visit. Has been considering options. Pt is here alone today.      Past Medical History:   Diagnosis Date    Breast CA ([address]     Metastatic cancer [facility])       Past Surgical History:   Procedure Laterality Date    BREAST SURGERY PROCEDURE UNLISTED  2004 mastectomy    HX GYN  2010    ovaries removed      Social History   Substance Use Topics    Smoking status: Never Smoker    Smokeless tobacco: Never Used    Alcohol use Yes      Comment: 3-4 drinks/month      Family History   Problem Relation Age of Onset    Hypertension Mother     Cancer Father      mesothelioma    Cancer Maternal Grandfather 76     Bladder     Current Outpatient Prescriptions   Medication Sig    ALPHA LIPOIC ACID PO Take  by mouth two (2) times a day.  OTHER 1 Cap daily. Vitamin B 17    naltrexone (DEPADE) 50 mg tablet Take 4.5 mg by mouth daily.  OTHER Green foods complex. 2 tabs daily    OTHER CBD oil, take 40 mg at bedtime    OTHER Indications: Organic Sulfa- Powder mix with liquids  , 4x per day    ascorbic acid, vitamin C, (VITAMIN C) 250 mg tablet Take 250 mg by mouth daily.  IODINE PO Take  by mouth.  BETA 1,3 GLUCAN, BULK, 3 Tabs by Does Not Apply route daily.  OTHER,NON-FORMULARY, Mushroom extract twice daily; Crocifurous extract    TURMERIC, BULK, by Does Not Apply route daily.  cholecalciferol (VITAMIN D3) 1,000 unit tablet Take 4,000 Units by mouth daily.  multivitamin (ONE A DAY) tablet Take 1 tablet by mouth daily.  coenzyme q10 (CO Q-10) 10 mg cap Take  by mouth daily.  LACTOBAC CMB #3/FOS/PANTETHINE (PROBIOTIC & ACIDOPHILUS PO) Take  by mouth daily.  magnesium 250 mg tab Take 500 mg by mouth daily.  MILK THISTLE, BULK, by Does Not Apply route. No current facility-administered medications for this visit. Allergies   Allergen Reactions    Pcn [Penicillins] Anaphylaxis    Contrast Agent [Iodine] Shortness of Breath and Itching     Patient reported during phone conversation on 4/18/17. Review of Systems: A complete review of systems was obtained, negative except as described above.     Physical Exam:     Visit Vitals    BP 91/57    Pulse 100    Resp 16    Ht 5' 7\" (1.702 m)    Wt 144 lb (65.3 kg)    SpO2 98%    BMI 22.55 kg/m2     ECOG PS: 1  General: No distress  Eyes: conj clear  HENT:  atraumatic  Neck: Supple  Respiratory: CTAB, normal respiratory effort  CV: Normal rate, regular rhythm  GI: Soft, nontender, nondistended  MS: ambulatory  Skin: warm, dry  Neuro/Psych: Alert, oriented, appropriate affect, normal judgment/insight      Results:     Lab Results   Component Value Date/Time    WBC 14.0 (H) 02/20/2018 02:45 AM    HGB 12.5 02/20/2018 02:45 AM    HCT 36.8 02/20/2018 02:45 AM    PLATELET 982 55/03/0152 02:45 AM    MCV 94.1 02/20/2018 02:45 AM    ABS. NEUTROPHILS 11.6 (H) 02/20/2018 02:45 AM    Hemoglobin (POC) 12.0 02/19/2018 02:56 PM     Lab Results   Component Value Date/Time    Sodium 139 02/20/2018 02:45 AM    Potassium 4.1 02/20/2018 02:45 AM    Chloride 104 02/20/2018 02:45 AM    CO2 26 02/20/2018 02:45 AM    Glucose 99 02/20/2018 02:45 AM    BUN 14 02/20/2018 02:45 AM    Creatinine 0.70 02/20/2018 02:45 AM    GFR est AA >60 02/20/2018 02:45 AM    GFR est non-AA >60 02/20/2018 02:45 AM    Calcium 9.4 02/20/2018 02:45 AM     Lab Results   Component Value Date/Time    Bilirubin, total 0.3 02/18/2018 03:42 AM    ALT (SGPT) 59 02/18/2018 03:42 AM    AST (SGOT) 58 (H) 02/18/2018 03:42 AM    Alk. phosphatase 147 (H) 02/18/2018 03:42 AM    Protein, total 6.5 02/18/2018 03:42 AM    Albumin 3.2 (L) 02/18/2018 03:42 AM    Globulin 3.3 02/18/2018 03:42 AM     Brain MRI without contrast 2/16/18: IMPRESSION: 1.3 cm left posterior temporal lobe mass with surrounding vasogenic  edema likely represents a metastases. No other lesions are identified. If  patient is willing, the patient could return for contrast-enhanced sequences. Brain MRI with contrast 2/18/18  IMPRESSION:  Left and right temporal lobe ring-enhancing masses, most consistent  with metastatic disease.     CT C/A/P 2/18/18  IMPRESSION:   1.  Nonspecific reticular nodular densities throughout the lungs may reflect  infectious or inflammatory process although metastatic neoplastic disease is not  excluded.     2. Stable bony metastases status post left mastectomy and left implant  reconstruction.     3. No other sites of suspected metastatic disease identified within the thorax,  abdomen, or pelvis.       Assessment and Recommendations:   Elena Elam is a 62 y.o. female with metastatic breast cancer admitted with new brain mass. 1. Metastatic breast cancer to brain ER + Her2 +   initially treated with left mastectomy and adjuvant chemotherapy, no radiation. She was treated with adjuvant endocrine therapy, but was diagnosed recurrence in Nov 2010 with Her2 positive and ER/HI positive disease. Has been off of treatment with Kadcyla since Oct 2017 per patient request.   Pt recently in hospital with brain mets post neurosurgery. Did well. Gamma knife for 3/21/18. CT C/A/P revealed stable bony metastasis and nodular opacities in lungs ? Inflammatory. Pt clinically feeling fine but still SOB/ has cough. Will do f/u CTs in 5/18. And will do labs also for f/u. Moe Nava Discussed options for further Her2 based systemic therapy. Reviewed tykerb/ xeloda vs herceptin/ perjeta/ taxane vs kadcyla again. Reviewed hormonal therapy options of faslodex/ ibrance also. Long d/w pt today about what regimen to choose. Pt and I decided on tykerb/ xeloda today. Reviewed risks, benefits and alternatives of this regimen today. Pt is agreeable to tykerb/ xeloda. Will set up. 2. Right temporal lobe met metastatic breast cancer. Resection/ gamma knife done. 3. occ SOB/ occ cough. Need to monitor with nonspecific CT findings in lungs. Need to recheck CTs.     4.  Family support good. Pt here alone today. F/u here in 2 weeks. Call if questions.       Signed By: Lea Cabrera,      April 6, 2018

## 2018-04-06 NOTE — PROGRESS NOTES
[facility] Lab Visit:    1120:  Pt arrived ambulatory and in no distress, labs drawn per Sepideh Roe RN. Departed [facility] ambulatory and in no distress. Visit Vitals    BP 96/66 (BP 1 Location: Right arm, BP Patient Position: Sitting)    Pulse 89    Temp 97.6 °F (36.4 °C)    Resp 16       Labs available in CC once resulted.

## 2018-04-09 NOTE — TELEPHONE ENCOUNTER
HIPAA verified. Stated has 6 weeks to work and wants to know if she can remain on kadcyla until off work as she is concerned about side effects on tykerb and xeloda. Advised would forward to provider for plan review. Will need orders this week if ok to continue Kadcyla.

## 2018-04-09 NOTE — TELEPHONE ENCOUNTER
Pt would like a call back from [place], she would not tell me what the message was, just stated she needs a call back today

## 2018-04-10 NOTE — TELEPHONE ENCOUNTER
Call to patient to advise of provider note. ML to return call. Needs ECHO/restart kadcyla x 2 months then scans.

## 2018-04-10 NOTE — TELEPHONE ENCOUNTER
HIPAA verified. Reviewed provider recommendations. Patient stated would like to start tykerb and xeloda. Also wanted lab results from 4/6/18. Stated has dry cough from \"pulmonary nodules\" and wanted to know what medication she could take for this. Encouraged po fluids. Patient stated was \"scared\" related to disease and ongoing treatment. Offered navigator support for complementary therapies.   Patient stated would like call from navigator and thanked for assist.

## 2018-04-10 NOTE — TELEPHONE ENCOUNTER
Pt can restart kadcyla if this is what she wants  Rather than tykerb/ xeloda  Would need to do for 2 months then have f/u CTs

## 2018-04-10 NOTE — TELEPHONE ENCOUNTER
Call from [facility] to clarify CT scan due date. Per Dr. Winnie Andrade office note scans due in May.   Thanked for assist.

## 2018-04-10 NOTE — TELEPHONE ENCOUNTER
Call to [facility] to advise that patient may be a bit late for ECHO 4/11/18. Spoke with Tollie Cabot. Stated patient has 20 min window and should be fine for appt.

## 2018-04-10 NOTE — PROGRESS NOTES
[address] [facility] Oncology at [facility]   Nurse Navigator Note      Spoke with patient. She is looking for information regarding supportive therapies and support groups. She has worked with and continues to rely on some online communities for support. She believes she would like to attend a local group. She was hoping for one near [facility]. We discussed there were general breast cancer support groups and an advanced breast cancer support group and the value in each. She would prefer an advanced breast cancer support group. Patient asked NN to hold while she answered a call from [employer]. Her chemo will be delivered tomorrow. Patient aware of [facility] and is interested in scheduling a massage. She will call to schedule and discuss other supportive therapies. NN reviewed upcoming workshop through [facility] on Mindfulness and Cancer. [facility] message to patient including all resources discussed. Patient agrees to contact NN at a later date to update and if any further support needed.

## 2018-04-11 NOTE — TELEPHONE ENCOUNTER
Call rec'd from [address]. HIPAA verified. Transferred tykerb and xeloda to [facility].   They have prescriptions and are processing

## 2018-04-12 NOTE — PROGRESS NOTES
Standing order for monthly CBC/CMP faxed to [address] [place] [address] complete and copy to patient.

## 2018-04-13 NOTE — PROGRESS NOTES
Received request for prior authorization for ondansetron ODT as it is not covered by patient's insurance. New prescription for ondansetron tablets (not ODT) sent to pharmacy, this may be covered.

## 2018-05-14 PROBLEM — R11.0 NAUSEA: Status: ACTIVE | Noted: 2018-01-01

## 2018-05-14 NOTE — PROGRESS NOTES
[address] [facility]   Medical Oncology at [facility]    Hematology / Oncology Progress Note          Reason for Visit:   Vashti Vanessa is a 62 y.o. female who is seen in office f/u for metastatic breast cancer to brain. Diagnosis: Metastatic breast cancer to liver and bone, now brain    TREATMENT COURSE: kadcyla started 6/17 and stopped 10/17.    4/2004, s/p L mastectomy with Dr. Pippa Khan at [facility]. Stage IIB, T2N1M0, 3/39 LN +, ER negative, DC +, HER 2 +, was enrolled on NSABP B-31 trial with adriamycin/cytoxan q 3 weeks x 4 followed by taxol weekly with herceptin x 12 then completion of a year of herceptin. States she did not receive XRT. Did receive 5 years of tamoxifen from 5205-7450, which she tolerated well. In 2009 she noticed a chest wall mass. 11/23/10 FNA of chest wall mass showed ER + > 90%, DC + > 90%, MIB 50%, HER 2 + by FISH ratio 6.29. S/p BSO 12/27/10. Received zometa from 2010 to 2011. In 2011, she was started on q 3 week trastuzumab and exemestane, but states she took the exemestane sparingly due to joint pains and body aches. Has not taken any AI since 2013. Briefly took anastrozole in 5/2014. Stopped anastrozole 1/1/15 due to continued body and joint aches. Started Herceptin 1/9/17, stopped 4/25/17 due to progressive disease noted on scans  Started Kadcyla 3.6 mg/kg every 21 days on 6/1/17, reduced to 3 mg/kg due to side effects on 6/26/17  Tykerb/ xeloda started 3/18      History of Present Illness:   Ms. Evaristo Arriaza is a 63 y/o female with metastatic breast cancer ER+ HER2+ post left temporal lobe brain met resection and gamma knife 3//21/18  Pt has not been on any systemic therapy by choice. CTs stable except for ? Infectious/ inflammatory changes in lungs. Pt had a ACHARYA today and is to see neurosurgery soon with MRI. Pt has SHINE/ cough chronically.         Past Medical History:   Diagnosis Date    Breast CA ([address]     Metastatic cancer [facility])       Past Surgical History:   Procedure Laterality Date    BREAST SURGERY PROCEDURE UNLISTED  2004    mastectomy    HX GYN  2010    ovaries removed      Social History   Substance Use Topics    Smoking status: Never Smoker    Smokeless tobacco: Never Used    Alcohol use Yes      Comment: 3-4 drinks/month      Family History   Problem Relation Age of Onset    Hypertension Mother     Cancer Father      mesothelioma    Cancer Maternal Grandfather 76     Bladder     Current Outpatient Prescriptions   Medication Sig    capecitabine (XELODA) 500 mg tablet     TYKERB 250 mg tablet     levoFLOXacin (LEVAQUIN) 500 mg tablet Take 1 Tab by mouth daily.  ALPHA LIPOIC ACID PO Take  by mouth two (2) times a day.  OTHER 1 Cap daily. Vitamin B 17    naltrexone (DEPADE) 50 mg tablet Take 4.5 mg by mouth daily.  OTHER Green foods complex. 2 tabs daily    OTHER CBD oil, take 40 mg at bedtime    OTHER Indications: Organic Sulfa- Powder mix with liquids  , 4x per day    ascorbic acid, vitamin C, (VITAMIN C) 250 mg tablet Take 250 mg by mouth daily.  IODINE PO Take  by mouth.  BETA 1,3 GLUCAN, BULK, 3 Tabs by Does Not Apply route daily.  OTHER,NON-FORMULARY, Mushroom extract twice daily; Crocifurous extract    TURMERIC, BULK, by Does Not Apply route daily.  cholecalciferol (VITAMIN D3) 1,000 unit tablet Take 4,000 Units by mouth daily.  coenzyme q10 (CO Q-10) 10 mg cap Take  by mouth daily.  LACTOBAC CMB #3/FOS/PANTETHINE (PROBIOTIC & ACIDOPHILUS PO) Take  by mouth daily.  magnesium 250 mg tab Take 500 mg by mouth daily.  ondansetron (ZOFRAN ODT) 8 mg disintegrating tablet Take 1 Tab by mouth every eight (8) hours as needed for Nausea.  MILK THISTLE, BULK, by Does Not Apply route.  multivitamin (ONE A DAY) tablet Take 1 tablet by mouth daily. No current facility-administered medications for this visit.        Allergies   Allergen Reactions    Pcn [Penicillins] Anaphylaxis    Contrast Agent [Iodine] Shortness of Breath and Itching     Patient reported during phone conversation on 4/18/17. Review of Systems: A complete review of systems was obtained, negative except as described above. Physical Exam:     Visit Vitals    /73    Pulse 89    Temp 97.7 °F (36.5 °C) (Oral)    Resp 16    Ht 5' 7\" (1.702 m)    Wt 143 lb (64.9 kg)    SpO2 96%    BMI 22.4 kg/m2     ECOG PS: 1  General: No distress  Eyes: conj clear  HENT:  atraumatic  Neck: Supple  Respiratory: CTAB, normal respiratory effort  CV: Normal rate, regular rhythm  GI: Soft, nontender, nondistended  MS: ambulatory  Skin: warm, dry  Neuro/Psych: Alert, oriented, appropriate affect, normal judgment/insight      Results:     Lab Results   Component Value Date/Time    WBC 6.4 04/27/2018 12:00 AM    HGB 11.9 04/27/2018 12:00 AM    HCT 35.8 04/27/2018 12:00 AM    PLATELET 731 13/24/1295 12:00 AM    MCV 95 04/27/2018 12:00 AM    ABS. NEUTROPHILS 4.3 04/27/2018 12:00 AM    Hemoglobin (POC) 12.0 02/19/2018 02:56 PM     Lab Results   Component Value Date/Time    Sodium 141 04/27/2018 12:00 AM    Potassium 4.6 04/27/2018 12:00 AM    Chloride 99 04/27/2018 12:00 AM    CO2 26 04/27/2018 12:00 AM    Glucose 105 (H) 04/27/2018 12:00 AM    BUN 12 04/27/2018 12:00 AM    Creatinine 0.70 04/27/2018 12:00 AM    GFR est  04/27/2018 12:00 AM    GFR est non-AA 96 04/27/2018 12:00 AM    Calcium 9.4 04/27/2018 12:00 AM     Lab Results   Component Value Date/Time    Bilirubin, total <0.2 04/27/2018 12:00 AM    ALT (SGPT) 62 (H) 04/27/2018 12:00 AM    AST (SGOT) 64 (H) 04/27/2018 12:00 AM    Alk. phosphatase 251 (H) 04/27/2018 12:00 AM    Protein, total 6.9 04/27/2018 12:00 AM    Albumin 4.4 04/27/2018 12:00 AM    Globulin 3.8 04/06/2018 02:29 PM     Brain MRI without contrast 2/16/18: IMPRESSION: 1.3 cm left posterior temporal lobe mass with surrounding vasogenic  edema likely represents a metastases. No other lesions are identified.  If  patient is willing, the patient could return for contrast-enhanced sequences. Brain MRI with contrast 2/18/18  IMPRESSION:  Left and right temporal lobe ring-enhancing masses, most consistent  with metastatic disease.     CT C/A/P 2/18/18  IMPRESSION:   1. Nonspecific reticular nodular densities throughout the lungs may reflect  infectious or inflammatory process although metastatic neoplastic disease is not  excluded.     2. Stable bony metastases status post left mastectomy and left implant  reconstruction.     3. No other sites of suspected metastatic disease identified within the thorax,  abdomen, or pelvis.       Assessment and Recommendations:   Kimberly De La Rosa is a 62 y.o. female with metastatic breast cancer admitted with new brain mass. 1. Metastatic breast cancer to brain ER + Her2 +   initially treated with left mastectomy and adjuvant chemotherapy, no radiation. She was treated with adjuvant endocrine therapy, but was diagnosed recurrence in Nov 2010 with Her2 positive and ER/FL positive disease. Has been off of treatment with Kadcyla since Oct 2017 per patient request.   Pt recently in hospital with brain mets post neurosurgery. Did well. Gamma knife for 3/21/18. CT C/A/P revealed stable bony metastasis and nodular opacities in lungs ? Inflammatory. Pt clinically feeling fine but still SOB/ has cough. f/u CTs in 5/18 show continued nonspecific lung changes otherwise stable cancer. Reviewed with pt /  today. ? Infectious vs inflammatory. Will try abx. rx levaquin. Pt is doing well overall with tykerb/ xeloda and has been on it for a month. Tolerating it well. Elevated LFTs some and will need to monitor. Labs prior to each cycle. 2. Right temporal lobe met metastatic breast cancer. Resection/ gamma knife done. Ordered f/u brain MRI. 3. occ SOB/ occ cough. nonspecific CT findings in lungs. Will try abx.     4.  Family support good.  here today. F/u here in 2 weeks.   Call if questions.       Signed By: Tricia Ferguson, DO     May 14, 2018

## 2018-05-29 NOTE — PROGRESS NOTES
Spoke with patient. HIPAA verified. Advised of results per provider note. Verbalized understanding. Advised patient she is due for office visit. States she will have labs drawn in next couple days & then schedule office visit.

## 2018-05-30 NOTE — TELEPHONE ENCOUNTER
HIPAA verified. Stated would like standing order labs re-faxed to [address] [place] [address] as there was some confusion and delay last draw. Patient also stated still has dry cough and some shortness of breath \"walking up hill\". Denies fever or any additional symptoms. Stated course of antibiotics helped with symptoms \"a little\". Advised would forward to provider. Next clinic appt 6/4/18.

## 2018-05-30 NOTE — TELEPHONE ENCOUNTER
Message left on patient identified VM that recommendation was for CXR. Requested call back to confirm.

## 2018-06-04 NOTE — TELEPHONE ENCOUNTER
Please see open telephone encounter. HIPAA verified. Patient stated son was in car accident last week and patient unable to return call. Stated cough is ongoing and that CXR and referral to pulmonary reviewed. Patient stated that she feels cough related to Tykerb as is less on rest weeks. Patient stated will discuss with provider today.

## 2018-06-04 NOTE — PROGRESS NOTES
[address] [facility]   Medical Oncology at [facility]    Hematology / Oncology Progress Note          Reason for Visit:   Elena Elam is a 62 y.o. female who is seen in office f/u for metastatic breast cancer to brain. Diagnosis: Metastatic breast cancer to liver and bone, now brain    TREATMENT COURSE: kadcyla started 6/17 and stopped 10/17.    4/2004, s/p L mastectomy with Dr. Rashawn Bowen at [facility]. Stage IIB, T2N1M0, 3/39 LN +, ER negative, RI +, HER 2 +, was enrolled on NSABP B-31 trial with adriamycin/cytoxan q 3 weeks x 4 followed by taxol weekly with herceptin x 12 then completion of a year of herceptin. States she did not receive XRT. Did receive 5 years of tamoxifen from 1806-0825, which she tolerated well. In 2009 she noticed a chest wall mass. 11/23/10 FNA of chest wall mass showed ER + > 90%, RI + > 90%, MIB 50%, HER 2 + by FISH ratio 6.29. S/p BSO 12/27/10. Received zometa from 2010 to 2011. In 2011, she was started on q 3 week trastuzumab and exemestane, but states she took the exemestane sparingly due to joint pains and body aches. Has not taken any AI since 2013. Briefly took anastrozole in 5/2014. Stopped anastrozole 1/1/15 due to continued body and joint aches. Started Herceptin 1/9/17, stopped 4/25/17 due to progressive disease noted on scans  Started Kadcyla 3.6 mg/kg every 21 days on 6/1/17, reduced to 3 mg/kg due to side effects on 6/26/17  Tykerb/ xeloda started 3/18      History of Present Illness:     Ms. Suella Kanner is a 63 y/o female with metastatic breast cancer ER+ HER2+ on tykerb/ xeloda since 3/18. Hx of left temporal lobe brain met resection and gamma knife 3//21/18. Brain MRI good 5/18. CTs stable except for ? Infectious/ inflammatory changes in lungs. Pt has SHINE/ cough chronically. Wants to see pulmonary.         Past Medical History:   Diagnosis Date    [facility] [address] [address]     Metastatic cancer [facility])       Past Surgical History:   Procedure Laterality Date    BREAST SURGERY PROCEDURE UNLISTED  2004    mastectomy    HX GYN  2010    ovaries removed      Social History   Substance Use Topics    Smoking status: Never Smoker    Smokeless tobacco: Never Used    Alcohol use Yes      Comment: 3-4 drinks/month      Family History   Problem Relation Age of Onset    Hypertension Mother     Cancer Father      mesothelioma    Cancer Maternal Grandfather 76     Bladder     Current Outpatient Prescriptions   Medication Sig    capecitabine (XELODA) 500 mg tablet     TYKERB 250 mg tablet     ALPHA LIPOIC ACID PO Take  by mouth two (2) times a day.  OTHER 1 Cap daily. Vitamin B 17    OTHER Green foods complex. 2 tabs daily    OTHER CBD oil, take 40 mg at bedtime    OTHER Indications: Organic Sulfa- Powder mix with liquids  , 4x per day    ascorbic acid, vitamin C, (VITAMIN C) 250 mg tablet Take 250 mg by mouth daily.  MILK THISTLE, BULK, by Does Not Apply route.  IODINE PO Take  by mouth.  BETA 1,3 GLUCAN, BULK, 3 Tabs by Does Not Apply route daily.  OTHER,NON-FORMULARY, Mushroom extract twice daily; Crocifurous extract    TURMERIC, BULK, by Does Not Apply route daily.  cholecalciferol (VITAMIN D3) 1,000 unit tablet Take 4,000 Units by mouth daily.  multivitamin (ONE A DAY) tablet Take 1 tablet by mouth daily.  coenzyme q10 (CO Q-10) 10 mg cap Take  by mouth daily.  LACTOBAC CMB #3/FOS/PANTETHINE (PROBIOTIC & ACIDOPHILUS PO) Take  by mouth daily.  magnesium 250 mg tab Take 500 mg by mouth daily.  levoFLOXacin (LEVAQUIN) 500 mg tablet Take 1 Tab by mouth daily.  ondansetron (ZOFRAN ODT) 8 mg disintegrating tablet Take 1 Tab by mouth every eight (8) hours as needed for Nausea.  naltrexone (DEPADE) 50 mg tablet Take 4.5 mg by mouth daily. No current facility-administered medications for this visit.        Allergies   Allergen Reactions    Pcn [Penicillins] Anaphylaxis    Contrast Agent [Iodine] Shortness of Breath and Itching     Patient reported during phone conversation on 4/18/17. Review of Systems: A complete review of systems was obtained, negative except as described above. Physical Exam:     Visit Vitals    /71    Pulse 78    Temp 98.1 °F (36.7 °C) (Oral)    Resp 16    Ht 5' 7\" (1.702 m)    Wt 144 lb (65.3 kg)    SpO2 98%    BMI 22.55 kg/m2     ECOG PS: 1  General: No distress  Eyes: conj clear  HENT:  atraumatic  Neck: Supple  Respiratory: CTAB, normal respiratory effort  CV: Normal rate, regular rhythm  GI: Soft, nontender, nondistended  MS: ambulatory  Skin: warm, dry  Neuro/Psych: Alert, oriented, appropriate affect, normal judgment/insight      Results:     Lab Results   Component Value Date/Time    WBC 5.7 05/31/2018 08:53 AM    HGB 12.9 05/31/2018 08:53 AM    HCT 38.4 05/31/2018 08:53 AM    PLATELET 809 (H) 00/82/8126 08:53 AM    MCV 96 05/31/2018 08:53 AM    ABS. NEUTROPHILS 3.6 05/31/2018 08:53 AM    Hemoglobin (POC) 12.0 02/19/2018 02:56 PM     Lab Results   Component Value Date/Time    Sodium 138 05/31/2018 08:53 AM    Potassium 4.6 05/31/2018 08:53 AM    Chloride 100 05/31/2018 08:53 AM    CO2 22 05/31/2018 08:53 AM    Glucose 77 05/31/2018 08:53 AM    BUN 17 05/31/2018 08:53 AM    Creatinine 0.64 05/31/2018 08:53 AM    GFR est  05/31/2018 08:53 AM    GFR est non-AA 99 05/31/2018 08:53 AM    Calcium 9.5 05/31/2018 08:53 AM     Lab Results   Component Value Date/Time    Bilirubin, total 0.4 05/31/2018 08:53 AM    ALT (SGPT) 67 (H) 05/31/2018 08:53 AM    AST (SGOT) 85 (H) 05/31/2018 08:53 AM    Alk. phosphatase 289 (H) 05/31/2018 08:53 AM    Protein, total 7.1 05/31/2018 08:53 AM    Albumin 4.1 05/31/2018 08:53 AM    Globulin 3.8 04/06/2018 02:29 PM     Brain MRI without contrast 2/16/18: IMPRESSION: 1.3 cm left posterior temporal lobe mass with surrounding vasogenic  edema likely represents a metastases. No other lesions are identified.  If  patient is willing, the patient could return for contrast-enhanced sequences. Brain MRI with contrast 2/18/18  IMPRESSION:  Left and right temporal lobe ring-enhancing masses, most consistent  with metastatic disease.     CT C/A/P 2/18/18  IMPRESSION:   1. Nonspecific reticular nodular densities throughout the lungs may reflect  infectious or inflammatory process although metastatic neoplastic disease is not  excluded.     2. Stable bony metastases status post left mastectomy and left implant  reconstruction.     3. No other sites of suspected metastatic disease identified within the thorax,  abdomen, or pelvis.       Assessment and Recommendations:   Vashti Vanessa is a 62 y.o. female with metastatic breast cancer admitted with new brain mass. 1. Metastatic breast cancer to brain ER + Her2 +   initially treated with left mastectomy and adjuvant chemotherapy, no radiation. She was treated with adjuvant endocrine therapy, but was diagnosed recurrence in Nov 2010 with Her2 positive and ER/AL positive disease. Has been off of treatment with Kadcyla since Oct 2017 per patient request.   Pt recently in hospital with brain mets post neurosurgery. Did well. Gamma knife for 3/21/18. CT C/A/P revealed stable bony metastasis and nodular opacities in lungs ? Inflammatory. Pt clinically feeling fine but still SOB/ has cough. f/u CTs in 5/18 show continued nonspecific lung changes otherwise stable cancer. ? Infectious vs inflammatory. did abx with levaquin. Will refer to pulmonary for lung  Changes. Pt is doing well overall with tykerb/ xeloda started 4/18. Tolerating it well. Continue tykerb/ xeloda  Elevated LFTs some and will need to monitor. Labs prior to each cycle. Last ECHO 4/18 good. 2. Right temporal lobe met metastatic breast cancer. Resection/ gamma knife done.  good brain MRI 5/18. 3. occ SOB/ occ cough. nonspecific CT findings in lungs. did abx and helped some. Pulmonary eval and will refer. 4.  Family support good. F/u here in 4 weeks. Call if questions.       Signed By: Fred Santana DO     June 4, 2018

## 2018-06-05 NOTE — TELEPHONE ENCOUNTER
Spoke with [employer]. Clinical info given and faxed complete to [phone]. Office to return call for appt.   (7min)

## 2018-06-06 NOTE — TELEPHONE ENCOUNTER
HIPAA verified. Gave contact info for [facility] for appt. Patient verbalized understanding. To call our office for any questions.

## 2018-07-02 NOTE — PROGRESS NOTES
Recd request from [facility] for test drawn in blue top tube. Patient has CBC and CMP standing orders for chemo monitoring. Faxed back request that no coag studies ordered by Dr. Michele Melendez requiring a blue top and to contact patient for re-draw if CBC and CMP not done. Fax complete.

## 2018-07-05 NOTE — PROGRESS NOTES
Patient missed office appt today. Message to YOLANDA BOND [facility] for rescheduling.

## 2018-07-10 NOTE — PROGRESS NOTES
[address] [facility]   Medical Oncology at [facility]    Hematology / Oncology Progress Note          Reason for Visit:   Chace Scott is a 62 y.o. female who is seen in office f/u for metastatic breast cancer to brain. Diagnosis: Metastatic breast cancer to liver and bone, now brain    TREATMENT COURSE: kadcyla started 6/17 and stopped 10/17.    4/2004, s/p L mastectomy with Dr. Gabriel Lehman at [facility]. Stage IIB, T2N1M0, 3/39 LN +, ER negative, WV +, HER 2 +, was enrolled on NSABP B-31 trial with adriamycin/cytoxan q 3 weeks x 4 followed by taxol weekly with herceptin x 12 then completion of a year of herceptin. States she did not receive XRT. Did receive 5 years of tamoxifen from 6040-9113, which she tolerated well. In 2009 she noticed a chest wall mass. 11/23/10 FNA of chest wall mass showed ER + > 90%, WV + > 90%, MIB 50%, HER 2 + by FISH ratio 6.29. S/p BSO 12/27/10. Received zometa from 2010 to 2011. In 2011, she was started on q 3 week trastuzumab and exemestane, but states she took the exemestane sparingly due to joint pains and body aches. Has not taken any AI since 2013. Briefly took anastrozole in 5/2014. Stopped anastrozole 1/1/15 due to continued body and joint aches. Started Herceptin 1/9/17, stopped 4/25/17 due to progressive disease noted on scans  Started Kadcyla 3.6 mg/kg every 21 days on 6/1/17, reduced to 3 mg/kg due to side effects on 6/26/17  Tykerb/ xeloda started 3/18      History of Present Illness:     Ms. Josesito Godfrey is a 61 y/o female with metastatic breast cancer ER+ HER2+ on tykerb/ xeloda since 3/18. Hx of left temporal lobe brain met resection and gamma knife 3/21/18. Her last brain MRI was good in 5/18. CTs in May were stable except for ? Infectious/ inflammatory changes in lungs. She went to see Bowen De La Rosa, Pulmonology, who started her on nebulizer treatments. This has helped with exertional dyspnea.  She is having another CT chest on Thursday per Dr. Orozco Cancer and additional work up as indicated. She does note that her breathing is a little better but that dry cough persists. Her appetite has been good. Energy is stable. She has remained active and is walking, doing aerobics, and kayaking. She has some intermittent joint pain with activity. Denies new lumps or bumps. Denies fevers, chills, night sweats. Denies hot flashes. Denies skin changes to palms of hands or soles of feet. Past Medical History:   Diagnosis Date    Breast CA ([facility] [address]     Metastatic cancer [facility])       Past Surgical History:   Procedure Laterality Date    BREAST SURGERY PROCEDURE UNLISTED Left 2004    mastectomy    HX GYN  2010    ovaries removed    HX VASCULAR ACCESS Right     portacath      Social History   Substance Use Topics    Smoking status: Never Smoker    Smokeless tobacco: Never Used    Alcohol use Yes      Comment: 3-4 drinks/month      Family History   Problem Relation Age of Onset    Hypertension Mother     Cancer Father      mesothelioma    Cancer Maternal Grandfather 76     Bladder     Current Outpatient Prescriptions   Medication Sig    capecitabine (XELODA) 500 mg tablet     TYKERB 250 mg tablet     ALPHA LIPOIC ACID PO Take  by mouth two (2) times a day.  OTHER 1 Cap daily. Vitamin B 17    OTHER Green foods complex. 2 tabs daily    OTHER CBD oil, take 40 mg at bedtime    OTHER Indications: Organic Sulfa- Powder mix with liquids  , 4x per day    ascorbic acid, vitamin C, (VITAMIN C) 250 mg tablet Take 250 mg by mouth daily.  MILK THISTLE, BULK, by Does Not Apply route.  IODINE PO Take  by mouth.  BETA 1,3 GLUCAN, BULK, 3 Tabs by Does Not Apply route daily.  OTHER,NON-FORMULARY, Mushroom extract twice daily; Crocifurous extract    TURMERIC, BULK, by Does Not Apply route daily.  cholecalciferol (VITAMIN D3) 1,000 unit tablet Take 4,000 Units by mouth daily.  multivitamin (ONE A DAY) tablet Take 1 tablet by mouth daily.     coenzyme q10 (CO Q-10) 10 mg cap Take  by mouth daily.    LACTOBAC CMB #3/FOS/PANTETHINE (PROBIOTIC & ACIDOPHILUS PO) Take  by mouth daily.  magnesium 250 mg tab Take 500 mg by mouth daily.  ondansetron (ZOFRAN ODT) 8 mg disintegrating tablet Take 1 Tab by mouth every eight (8) hours as needed for Nausea.  naltrexone (DEPADE) 50 mg tablet Take 4.5 mg by mouth daily. No current facility-administered medications for this visit. Allergies   Allergen Reactions    Pcn [Penicillins] Anaphylaxis    Contrast Agent [Iodine] Shortness of Breath and Itching     Patient reported during phone conversation on 4/18/17. Review of Systems: A complete review of systems was obtained, negative except as described above. Physical Exam:     Visit Vitals    /75    Pulse 89    Temp 98.2 °F (36.8 °C) (Oral)    Resp 16    Ht 5' 7\" (1.702 m)    Wt 145 lb (65.8 kg)    SpO2 97%    BMI 22.71 kg/m2     ECOG PS: 1  General: No distress  Eyes: conj clear  HENT:  atraumatic  Neck: Supple  Respiratory: CTAB, normal respiratory effort  CV: Normal rate, regular rhythm  GI: Soft, nontender, nondistended  MS: ambulatory  Skin: warm, dry  Neuro/Psych: Alert, oriented, appropriate affect, normal judgment/insight      Results:     Lab Results   Component Value Date/Time    WBC 7.6 07/12/2018 10:21 AM    HGB 14.0 07/12/2018 10:21 AM    HCT 42.5 07/12/2018 10:21 AM    PLATELET 186 (H) 08/47/5246 10:21 AM     (H) 07/12/2018 10:21 AM    ABS.  NEUTROPHILS 5.6 07/12/2018 10:21 AM    Hemoglobin (POC) 12.0 02/19/2018 02:56 PM     Lab Results   Component Value Date/Time    Sodium 140 07/12/2018 10:21 AM    Potassium 4.9 07/12/2018 10:21 AM    Chloride 101 07/12/2018 10:21 AM    CO2 20 07/12/2018 10:21 AM    Glucose 72 07/12/2018 10:21 AM    BUN 15 07/12/2018 10:21 AM    Creatinine 0.66 07/12/2018 10:21 AM    GFR est  07/12/2018 10:21 AM    GFR est non-AA 98 07/12/2018 10:21 AM    Calcium 9.5 07/12/2018 10:21 AM     Lab Results   Component Value Date/Time Bilirubin, total 0.3 07/12/2018 10:21 AM    ALT (SGPT) 51 (H) 07/12/2018 10:21 AM    AST (SGOT) 86 (H) 07/12/2018 10:21 AM    Alk. phosphatase 219 (H) 07/12/2018 10:21 AM    Protein, total 7.3 07/12/2018 10:21 AM    Albumin 4.4 07/12/2018 10:21 AM    Globulin 3.8 04/06/2018 02:29 PM     Brain MRI without contrast 2/16/18: IMPRESSION: 1.3 cm left posterior temporal lobe mass with surrounding vasogenic  edema likely represents a metastases. No other lesions are identified. If  patient is willing, the patient could return for contrast-enhanced sequences. Brain MRI with contrast 2/18/18  IMPRESSION:  Left and right temporal lobe ring-enhancing masses, most consistent  with metastatic disease.     CT C/A/P 2/18/18  IMPRESSION:   1. Nonspecific reticular nodular densities throughout the lungs may reflect  infectious or inflammatory process although metastatic neoplastic disease is not  excluded.     2. Stable bony metastases status post left mastectomy and left implant  reconstruction.     3. No other sites of suspected metastatic disease identified within the thorax,  abdomen, or pelvis.       Assessment and Recommendations:   Kathryn Cobos is a 62 y.o. female with metastatic breast cancer admitted with new brain mass. 1. Metastatic breast cancer to brain ER + Her2 +   initially treated with left mastectomy and adjuvant chemotherapy, no radiation. She was treated with adjuvant endocrine therapy, but was diagnosed recurrence in Nov 2010 with Her2 positive and ER/ID positive disease. Has been off of treatment with Kadcyla since Oct 2017 per patient request.   Pt recently in hospital with brain mets post neurosurgery. Did well. Gamma knife for 3/21/18. CT C/A/P revealed stable bony metastasis and nodular opacities in lungs ? Inflammatory. F/u CTs in 5/18 show continued nonspecific lung changes otherwise stable cancer.   She saw Pulmonology for this per #3    She continues to do well with tykerb/xeloda which was initiated in April 2018. She has had some elevated LFT's, likely secondary to tykerb, and due for repeat labs. She will have these done Friday  Last ECHO 4/18 and due for repeat. Will order today. Will repeat CT imaging in August and ordered today. She plans to have this done middle of July    2. Right temporal lobe met metastatic breast cancer. Resection/ gamma knife completed. Most recent brain MRI 5/18 good. Repeat due in August - will order today. 3. Dyspnea, intermittent and dry cough. Slightly improved today per report. Nonspecific CT findings in lungs and she was referred to Pulmonology. She follows with Chevy Cortez and repeat chest CT ordered for Thursday. Continued work up and management per [email or web address]. 4. Psychosocial. She has good family support. F/u here in 4 weeks. Call if questions.       Signed By: Sundar Cheema DO     July 14, 2018

## 2018-07-10 NOTE — TELEPHONE ENCOUNTER
HIPAA verified. Advised of ECHO appt. Stated will have labs done 7/12/18 also.   Thanked for assist.

## 2018-07-10 NOTE — PROGRESS NOTES
Patient has standing lab orders at [address] [place] [address] for tykerb/xeloda monitoring. ECHO due. Patient stated would like 7/12/18 in conjunction with CT scan at [address] [place] [address]. Stated is compliant with oral chemo.

## 2018-07-12 NOTE — PERIOP NOTES
[address]  Endoscopy Preprocedure Instructions      1. On the day of your surgery, please report to registration located on the 2nd floor of the  [facility]. yes    2. You must have a responsible adult to drive you to the hospital, stay at the hospital during your procedure and drive you home. If they leave your procedure will not be started (no exceptions). yes    3. Do not have anything to eat or drink (including water, gum, mints, coffee, and juice) after midnight. This does not apply to the medications you were instructed to take by your physician. yesIf you are currently taking Plavix, Coumadin, Aspirin, or other blood-thinning agents, contact your physician for special instructions. yes,    4. If you are having a procedure that requires bowel prep: We recommend that you have only clear liquids the day before your procedure and begin your bowel prep by 5:00 pm.  You may continue to drink clear liquids until midnight. If for any reason you are not able to complete your prep please notify your physician immediately. yes    5. Have a list of all current medications, including vitamins, herbal supplements and any other over the counter medications. yes    6. If you wear glasses, contacts, dentures and/or hearing aids, they may be removed prior to procedure, please bring a case to store them in. yes    7. You should understand that if you do not follow these instructions your procedure may be cancelled. If your physical condition changes (I.e. fever, cold or flu) please contact your doctor as soon as possible. 8. It is important that you be on time.   If for any reason you are unable to keep your appointment please call )- the day of or your physicians office prior to your scheduled procedure

## 2018-07-14 PROBLEM — R06.09 DOE (DYSPNEA ON EXERTION): Status: ACTIVE | Noted: 2018-01-01

## 2018-07-17 NOTE — TELEPHONE ENCOUNTER
HIPAA verified. Advised of MUGA scan appt/prep. Patient verbalized understanding. Wanted to know review of labs 7/13/18-scanned to media. RN advised pending provider response for brain MRI w/o contrast and plan for aches. Patient appreciative of call.

## 2018-07-17 NOTE — TELEPHONE ENCOUNTER
Spoke with Alethea/LEONORA  ECHO 7/30/18  10a  Arrive 9:45a  Prep:  NPO except water 2-4 hours  No tobacco 6 hours prior  No caffeine 12-24 hours prior  No beta blockers 24 hours

## 2018-07-17 NOTE — DISCHARGE INSTRUCTIONS
Deonna Saldivar  [id number]  1960        BRONCHOSCOPY DISCHARGE INSTRUCTIONS  Discomfort:  Sore throat- throat lozenges or warm salt water gargle  redness at IV site- apply warm compress to area; if redness or soreness persist- contact your physician  Gaseous discomfort- walking, belching will help relieve any discomfort  You may not operate a vehicle for 12 hours  You may not engage in an occupation involving machinery or appliances for rest of today  You insert smart list may notdrink alcoholic beverages for at least 12 hours  Avoid making any critical decisions for at least 24 hour  Blood tinged secretions - this should stop within 2-3 hours  DIET  Nothing by mouth- do not eat or drink for two hours. You may eat and drink after 3:30 pm                        You may resume your regular diet - however -  remember your colon is empty                                      and a heavy meal will produce gas. Avoid these foods:  vegetables, fried / greasy foods, carbonated drinks  ACTIVITY  You may resume your normal daily activities however it is recommended that you spend the remainder of the day resting -  avoid any strenuous activity. CALL M.D.   ANY SIGN OF                                                   Increasing pain, nausea, vomiting  Abdominal distension (swelling)  New increased bleeding (oral or rectal)  Fever (chills)  Pain in chest area  Bloody discharge from nose or mouth  Shortness of breath     You may not  take any Advil, Aspirin, Ibuprofen, Motrin, Aleve, or Goodys for 10 days     Call physicians office for following  Results of procedure 5-7 days  Telephone #  [phone]                               Routing History       Date/Time From To Method     07/17/18 8160 MD Karly Ely MD Fax

## 2018-07-17 NOTE — IP AVS SNAPSHOT
Flavio Repton 
 
 
 [facility] [address] [facility] 
[phone] Patient: Kanika Lira MRN: [id number] :1960 About your hospitalization You were admitted on:  2018 You last received care in the:  [facility] ENDOSCOPY You were discharged on:  2018 Why you were hospitalized Your primary diagnosis was:  Not on File Follow-up Information None Discharge Orders None A check sherif indicates which time of day the medication should be taken. My Medications ASK your doctor about these medications Instructions Each Dose to Equal  
 Morning Noon Evening Bedtime ALPHA LIPOIC ACID PO Your last dose was: Your next dose is: Take  by mouth two (2) times a day. BETA 1,3 GLUCAN (BULK) Your last dose was: Your next dose is:    
   
   
 3 Tabs by Does Not Apply route daily. 3 Tab  
    
   
   
   
  
 capecitabine 500 mg tablet Commonly known as:  XELODA Your last dose was: Your next dose is:    
   
   
      
   
   
   
  
 cholecalciferol 1,000 unit tablet Commonly known as:  VITAMIN D3 Your last dose was: Your next dose is: Take 4,000 Units by mouth daily. 4000 Units CO Q-10 10 mg Cap Generic drug:  coenzyme q10 Your last dose was: Your next dose is: Take  by mouth daily. IODINE PO Your last dose was: Your next dose is: Take  by mouth.  
     
   
   
   
  
 magnesium 250 mg Tab Your last dose was: Your next dose is: Take 500 mg by mouth daily. 500 mg MILK THISTLE (BULK) Your last dose was: Your next dose is:    
   
   
 by Does Not Apply route. multivitamin tablet Commonly known as:  ONE A DAY Your last dose was: Your next dose is: Take 1 tablet by mouth daily. 1 Tab  
    
   
   
   
  
 * OTHER Your last dose was: Your next dose is:    
   
   
 Indications: Organic Sulfa- Powder mix with liquids  , 4x per day * OTHER Your last dose was: Your next dose is: CBD oil, take 40 mg at bedtime OTHER Your last dose was: Your next dose is:    
   
   
 Green foods complex. 2 tabs daily OTHER Your last dose was: Your next dose is:    
   
   
 1 Cap daily. Vitamin B 17  
 1 Cap OTHER(NON-FORMULARY) Your last dose was: Your next dose is: Mushroom extract twice daily; Crocifurous extract PROBIOTIC & ACIDOPHILUS PO Your last dose was: Your next dose is: Take  by mouth daily. TURMERIC (BULK) Your last dose was: Your next dose is:    
   
   
 by Does Not Apply route daily. TYKERB 250 mg tablet Generic drug:  Lapatinib Your last dose was: Your next dose is: VITAMIN C 250 mg tablet Generic drug:  ascorbic acid (vitamin C) Your last dose was: Your next dose is: Take 250 mg by mouth daily. 250 mg  
    
   
   
   
  
 * Notice: This list has 2 medication(s) that are the same as other medications prescribed for you. Read the directions carefully, and ask your doctor or other care provider to review them with you. Discharge Instructions Julia Graham [id number] 
1960 
  
  
BRONCHOSCOPY DISCHARGE INSTRUCTIONS Discomfort: 
Sore throat- throat lozenges or warm salt water gargle 
redness at IV site- apply warm compress to area; if redness or soreness persist- contact your physician Gaseous discomfort- walking, belching will help relieve any discomfort You may not operate a vehicle for 12 hours You may not engage in an occupation involving machinery or appliances for rest of today You insert smart list may notdrink alcoholic beverages for at least 12 hours Avoid making any critical decisions for at least 24 hour Blood tinged secretions  this should stop within 2-3 hours DIET Nothing by mouth- do not eat or drink for two hours. You may eat and drink after 3:30 pm 
                      You may resume your regular diet  however -  remember your colon is empty                                      and a heavy meal will produce gas. Avoid these foods:  vegetables, fried / greasy foods, carbonated drinks ACTIVITY You may resume your normal daily activities however it is recommended that you spend the remainder of the day resting -  avoid any strenuous activity. CALL M.D. ANY SIGN OF Increasing pain, nausea, vomiting Abdominal distension (swelling) New increased bleeding (oral or rectal) Fever (chills) Pain in chest area Bloody discharge from nose or mouth Shortness of breath 
  
You may not  take any Advil, Aspirin, Ibuprofen, Motrin, Aleve, or Goodys for 10 days 
  
Call physicians office for following Results of procedure 5-7 days Telephone #  [phone] 
  
  
  
  
  
  
   
   
  
Routing History    
  Date/Time From To Method  
  07/17/18 1350 MD Primitivo Carr MD Fax  
   
     
   
 
 
 
  
  
  
[facility] Announcement We are excited to announce that we are making your provider's discharge notes available to you in [facility]. You will see these notes when they are completed and signed by the physician that discharged you from your recent hospital stay.   If you have any questions or concerns about any information you see in CRMnext, please call the Health Information Department where you were seen or reach out to your Primary Care Provider for more information about your plan of care. Introducing [facility]! Dear José Miguel Mendez: Thank you for requesting a CRMnext account. Our records indicate that you already have an active CRMnext account. You can access your account anytime at [email or web address] Did you know that you can access your hospital and ER discharge instructions at any time in CRMnext? You can also review all of your test results from your hospital stay or ER visit. Additional Information If you have questions, please visit the Frequently Asked Questions section of the CRMnext website at [email or web address] Remember, CRMnext is NOT to be used for urgent needs. For medical emergencies, dial 911. Now available from your iPhone and Android! Introducing Cedrick Streeter As a [employer] patient, I wanted to make you aware of our electronic visit tool called Cedrick Streeter. [employer] 24/7 allows you to connect within minutes with a medical provider 24 hours a day, seven days a week via a mobile device or tablet or logging into a secure website from your computer. You can access Cedrick Streeter from anywhere in the [place]. A virtual visit might be right for you when you have a simple condition and feel like you just dont want to get out of bed, or cant get away from work for an appointment, when your regular [employer] provider is not available (evenings, weekends or holidays), or when youre out of town and need minor care. Electronic visits cost only $49 and if the [employer] 24/7 provider determines a prescription is needed to treat your condition, one can be electronically transmitted to a nearby pharmacy*. Please take a moment to enroll today if you have not already done so.   The enrollment process is free and takes just a few minutes. To enroll, please download the [employer] 24/7 mac to your tablet or phone, or visit [email or web address] to enroll on your computer. And, as an [address] [place] [address] patient with a [employer] account, the results of your visits will be scanned into your electronic medical record and your primary care provider will be able to view the scanned results. We urge you to continue to see your regular [employer] provider for your ongoing medical care. And while your primary care provider may not be the one available when you seek a [employer] virtual visit, the peace of mind you get from getting a real diagnosis real time can be priceless. For more information on [employer], view our Frequently Asked Questions (FAQs) at [email or web address]. Sincerely, 
 
Dhiraj Morgna MD 
Chief Medical Officer [employer] *:  certain medications cannot be prescribed via [employer] Providers Seen During Your Hospitalization Provider Specialty Primary office phone Claudetta Hoar, MD Pulmonary Disease [phone] Your Primary Care Physician (PCP) Primary Care Physician Office Phone Office Fax Sacha Bond [phone] You are allergic to the following Allergen Reactions Pcn (Penicillins) Anaphylaxis Contrast Agent (Iodine) Shortness of Breath Itching Patient reported during phone conversation on 4/18/17. Recent Documentation Height Weight BMI OB Status Smoking Status 1.676 m 65.8 kg 23.4 kg/m2 Medically Induced Never Smoker Emergency Contacts Name Discharge Info Relation Home Work Mobile Jean-Pierre Graham DISCHARGE CAREGIVER [3] Spouse [3] [phone] Patient Belongings The following personal items are in your possession at time of discharge: Dental Appliances: None  Visual Aid: None Please provide this summary of care documentation to your next provider. Signatures-by signing, you are acknowledging that this After Visit Summary has been reviewed with you and you have received a copy. Patient Signature:  ____________________________________________________________ Date:  ____________________________________________________________  
  
Aloma Snellen Provider Signature:  ____________________________________________________________ Date:  ____________________________________________________________

## 2018-07-17 NOTE — PERIOP NOTES
1350: Endoscope was pre-cleaned at bedside immediately following procedure Maxim Urena Patient tolerated the procedure well. Received anesthetics per MD instruction. Report given to Jelly Brito in the post procedure area.

## 2018-07-17 NOTE — PROGRESS NOTES
Chace Scott  1960  [id number]    Situation:  Verbal report received from: Jeannine Yu RN  Procedure: Procedure(s):  BRONCHOSCOPY  BRONCHIAL ALVEOLAR LAVAGE    Background:    Preoperative diagnosis: abnormal CXR  Postoperative diagnosis: * No post-op diagnosis entered *    :  Dr. Orozco Cancer  Assistant(s): Endoscopy RN-1: Denisse Schumacher RN    Specimens: * No specimens in log *  H. Pylori  no    Assessment:  Intra-procedure medications     Anesthesia gave intra-procedure sedation and medications, see anesthesia flow sheet yes    Intravenous fluids: NS@ KVO     Vital signs stable     Abdominal assessment: round and soft   yes    Recommendation:  Discharge patient per MD order  yes.   Return to floor  outpatient  Family or Friend   Son   Permission to share finding with family or friend yes

## 2018-07-17 NOTE — TELEPHONE ENCOUNTER
HIPAA verified. Stated just had bronchoscopy and is feeling ok. Pulmonary office to fax note. Advised of MUGA result and provider recommendation. Patient stated is taking tykerb daily 250mg/dose. Patient advised to hold pending MUGA. Remains on xeloda. Reported 3-4 day hip/leg/knee \"aching\". Stated discomfort now 3/10 and yesterday 6-7/10. Stated has not scheduled brain MRI. Stated Dr. Dany Mortimer office recommended checking to see if contrast was needed with this MRI. Advised would forward to provider and return call. Patient verbalized understanding.

## 2018-07-17 NOTE — DISCHARGE SUMMARY
Kathe Ramirez  [id number]  1960      BRONCHOSCOPY DISCHARGE INSTRUCTIONS  Discomfort:  Sore throat- throat lozenges or warm salt water gargle  redness at IV site- apply warm compress to area; if redness or soreness persist- contact your physician  Gaseous discomfort- walking, belching will help relieve any discomfort  You may not operate a vehicle for 12 hours  You may not engage in an occupation involving machinery or appliances for rest of today  You insert smart list may notdrink alcoholic beverages for at least 12 hours  Avoid making any critical decisions for at least 24 hour  Blood tinged secretions  this should stop within 2-3 hours  DIET  Nothing by mouth- do not eat or drink for two hours. You may eat and drink after 3:30 pm   You may resume your regular diet  however -  remember your colon is empty     and a heavy meal will produce gas. Avoid these foods:  vegetables, fried / greasy foods, carbonated drinks  ACTIVITY  You may resume your normal daily activities however it is recommended that you spend the remainder of the day resting -  avoid any strenuous activity. CALL M.D.   ANY SIGN OF   Increasing pain, nausea, vomiting  Abdominal distension (swelling)  New increased bleeding (oral or rectal)  Fever (chills)  Pain in chest area  Bloody discharge from nose or mouth  Shortness of breath    You smart list may take any Advil, Aspirin, Ibuprofen, Motrin, Aleve, or Goodys for 10 days    Call physicians office for following  Results of procedure 5-7 days  Telephone #  [phone]

## 2018-07-17 NOTE — PROCEDURES
[address] 
[phone] Julia Graham 1960 
[id number] Date of Procedure: 7/17/2018 Preoperative diagnosis: abnormal CXR Procedure: Procedure(s): BRONCHOSCOPY 
BRONCHIAL ALVEOLAR LAVAGE Indication: Abnormal CT :  Omar Merino MD 
 
Assistant(s): Endoscopy RN-1: Ann Gann RN Anesthesia/Sedation:  Versed 2.5 mg IV and Fentanyl 50 mcg IV Procedure Details: After infomed consent was obtained for the procedure, with all risks and benefits of procedure explained the patient was taken to the endoscopy suite and placed in the supine position. Following sequential administration of sedation as per above, the bronchoscope was inserted into the mouth and advanced under direct vision to the lungs bilaterally. Findings:  
No gross anatomical or mucosal abnormalities of the vocal cord or airways throughout. No significant secretions Therapies:  
90 cc of sterile fluid instilled into the RML Specimens:  
50cc returned, sent for studies Complications:   None noted; patient tolerated the procedure well. EBL:  Minimal 
        
Impression:    Abnormal CT Recommendations:  
Await study results Manjinder Singer MD 
7/17/2018  1:44 PM

## 2018-07-17 NOTE — PROGRESS NOTES
LVEF is decreased from 60-50 %  What dose of tykerb is she on?   May need to hold tykerb  May need to get MUGA for better EF Subjective:     Patient ID: Raymond Stuart is a 75 y.o. male.    Chief Complaint: Follow-up (Left foot check(bottom of foot-draining clear drainage; has a small opening in the center). Patient states no current pain other than the normal neuropathy.); Callouses (Right 5th toe. ); and Diabetes Mellitus (Last PCP visit with -5/22/17 . AM glucose- 120 mg/dL. )    Raymond is a 75 y.o. male who presents to the clinic for evaluation and treatment of high risk feet. Raymond has a past medical history of Diabetes mellitus, type 2; Hyperlipidemia; Hypertension; Irregular heartbeat; PVD (peripheral vascular disease); S/P femoral-popliteal bypass surgery (8/8/2014); and S/P peripheral artery angioplasty (8/8/2014). The patient's chief complaint is diabetic foot ulcer, left foot..Patient states home health is still coming out. Patient denies any drainage from the foot. Patient states he is taking oral antibiotics. Patient states his blood sugar this morning was 120mg/dl. This patient has documented high risk feet requiring routine maintenance secondary to diabetes mellitis and those secondary complications of diabetes, as mentioned.     PCP: JOHNATHON Cristobal Jr, MD    Date Last Seen by PCP: 5/22/17    Current shoe gear:  Affected Foot: Rx diabetic extra depth shoes and custom accommodative insoles     Unaffected Foot: Rx diabetic extra depth shoes and custom accommodative insoles    History of Trauma: negative  Sign of Infection: none    Hemoglobin A1C   Date Value Ref Range Status   05/16/2017 7.3 (H) 4.5 - 6.2 % Final     Comment:     According to ADA guidelines, hemoglobin A1C <7.0% represents  optimal control in non-pregnant diabetic patients.  Different  metrics may apply to specific populations.   Standards of Medical Care in Diabetes - 2016.  For the purpose of screening for the presence of diabetes:  <5.7%     Consistent with the absence of diabetes  5.7-6.4%  Consistent with increasing risk for diabetes    (prediabetes)  >or=6.5%  Consistent with diabetes  Currently no consensus exists for use of hemoglobin A1C  for diagnosis of diabetes for children.     11/09/2016 6.9 (H) 4.5 - 6.2 % Final     Comment:     According to ADA guidelines, hemoglobin A1C <7.0% represents  optimal control in non-pregnant diabetic patients.  Different  metrics may apply to specific populations.   Standards of Medical Care in Diabetes - 2016.  For the purpose of screening for the presence of diabetes:  <5.7%     Consistent with the absence of diabetes  5.7-6.4%  Consistent with increasing risk for diabetes   (prediabetes)  >or=6.5%  Consistent with diabetes  Currently no consensus exists for use of hemoglobin A1C  for diagnosis of diabetes for children.     08/10/2016 8.1 (H) 4.5 - 6.2 % Final     Comment:     According to ADA guidelines, hemoglobin A1C <7.0% represents  optimal control in non-pregnant diabetic patients.  Different  metrics may apply to specific populations.   Standards of Medical Care in Diabetes - 2016.  For the purpose of screening for the presence of diabetes:  <5.7%     Consistent with the absence of diabetes  5.7-6.4%  Consistent with increasing risk for diabetes   (prediabetes)  >or=6.5%  Consistent with diabetes  Currently no consensus exists for use of hemoglobin A1C  for diagnosis of diabetes for children.             Patient Active Problem List   Diagnosis    Hyperlipidemia associated with type 2 diabetes mellitus    Essential hypertension    A-fib    PVD (peripheral vascular disease)    Special screening for malignant neoplasms, colon    S/P femoral-popliteal bypass surgery    S/P peripheral artery angioplasty    Type 2 diabetes mellitus with microalbuminuria    Type 2 diabetes mellitus with diabetic neuropathy    Osteomyelitis of toe of left foot       Medication List with Changes/Refills   Current Medications    AMOXICILLIN-CLAVULANATE 500-125MG (AUGMENTIN) 500-125 MG TAB    Take 1 tablet by mouth  2 (two) times daily.    ATENOLOL (TENORMIN) 100 MG TABLET    Take 1 tablet (100 mg total) by mouth once daily.    CEPHALEXIN (KEFLEX) 500 MG CAPSULE        CLOPIDOGREL (PLAVIX) 75 MG TABLET    Take 1 tablet by mouth  daily    DAPTOMYCIN (CUBICIN) INJECTION        DIPHENHYDRAMINE (BENADRYL) 50 MG/ML INJECTION        DOXYCYCLINE (VIBRAMYCIN) 100 MG CAP        FENOFIBRATE MICRONIZED (LOFIBRA) 134 MG CAP    Take 1 capsule (134 mg total) by mouth once daily.    FLUCONAZOLE (DIFLUCAN) 100 MG TABLET    Take 100 mg by mouth once daily.    GABAPENTIN (NEURONTIN) 600 MG TABLET    Take 2 tablets (1,200 mg total) by mouth 2 (two) times daily.    GLIPIZIDE-METFORMIN (METAGLIP) 2.5-500 MG PER TABLET    Take 2 tablets by mouth  twice a day before meals in the morning and evening    HEPARIN, PORCINE, PF, (HEPARIN FLUSH 100 UNITS/ML) 100 UNIT/ML SYRG        HYDROCODONE-ACETAMINOPHEN 7.5-325MG (NORCO) 7.5-325 MG PER TABLET    Take 1 tablet by mouth every 6 (six) hours as needed for Pain.    INVANZ 1 GRAM INJECTION        LISINOPRIL-HYDROCHLOROTHIAZIDE (PRINZIDE,ZESTORETIC) 20-12.5 MG PER TABLET    Take 2 tablets by mouth once daily.    MINOCYCLINE (MINOCIN,DYNACIN) 100 MG CAPSULE    Take 100 mg by mouth.     NIACIN 1,000 MG TBSR    Take 1 tablet by mouth Daily.    NORMAL SALINE FLUSH 0.9 % INJECTION        PREDNISONE (DELTASONE) 20 MG TABLET        ROSUVASTATIN (CRESTOR) 40 MG TAB    Take 1 tablet (40 mg total) by mouth once daily.    SANTYL OINTMENT        VANCOMYCIN HCL IN DEXTROSE 5 % (VANCOMYCIN IN DEXTROSE 5 %) 1 GRAM/200 ML PGBK        WARFARIN (COUMADIN) 5 MG TABLET    TAKE 1 AND 1/2 TABLETS BY  MOUTH ON WEDNESDAY AND  SUNDAY AND 1 TABLET ALL  REMAINING DAY AS DIRECTED  BY THE COUMADIN CLINIC.       Review of patient's allergies indicates:   Allergen Reactions    Sulfa (sulfonamide antibiotics)      Other reaction(s): Stomach upset    Sulfamethoxazole Rash    Trimethoprim Rash     Bactrim         Past Surgical History:  "  Procedure Laterality Date    CARDIAC ELECTROPHYSIOLOGY MAPPING AND ABLATION      POPLITEAL ARTERY STENT  2013    RECTOPERITONEAL FISTULA CLOSURE      TOE SURGERY      Joint release    TONSILLECTOMY         History reviewed. No pertinent family history.    Social History     Social History    Marital status:      Spouse name: N/A    Number of children: N/A    Years of education: N/A     Occupational History    Retired [employer]     Social History Main Topics    Smoking status: Former Smoker     Packs/day: 4.00     Years: 30.00     Types: Cigarettes     Quit date: 1/9/1994    Smokeless tobacco: Never Used    Alcohol use No    Drug use: No    Sexual activity: Not on file     Other Topics Concern    Not on file     Social History Narrative    No narrative on file       Vitals:    09/22/17 0852   BP: (!) 117/55   Pulse: 73   Weight: 112.9 kg (249 lb)   Height: 5' 6" (1.676 m)   PainSc: 0-No pain       Hemoglobin A1C   Date Value Ref Range Status   05/16/2017 7.3 (H) 4.5 - 6.2 % Final     Comment:     According to ADA guidelines, hemoglobin A1C <7.0% represents  optimal control in non-pregnant diabetic patients.  Different  metrics may apply to specific populations.   Standards of Medical Care in Diabetes - 2016.  For the purpose of screening for the presence of diabetes:  <5.7%     Consistent with the absence of diabetes  5.7-6.4%  Consistent with increasing risk for diabetes   (prediabetes)  >or=6.5%  Consistent with diabetes  Currently no consensus exists for use of hemoglobin A1C  for diagnosis of diabetes for children.     11/09/2016 6.9 (H) 4.5 - 6.2 % Final     Comment:     According to ADA guidelines, hemoglobin A1C <7.0% represents  optimal control in non-pregnant diabetic patients.  Different  metrics may apply to specific populations.   Standards of Medical Care in Diabetes - 2016.  For the purpose of screening for the presence of diabetes:  <5.7%     Consistent with the " absence of diabetes  5.7-6.4%  Consistent with increasing risk for diabetes   (prediabetes)  >or=6.5%  Consistent with diabetes  Currently no consensus exists for use of hemoglobin A1C  for diagnosis of diabetes for children.     08/10/2016 8.1 (H) 4.5 - 6.2 % Final     Comment:     According to ADA guidelines, hemoglobin A1C <7.0% represents  optimal control in non-pregnant diabetic patients.  Different  metrics may apply to specific populations.   Standards of Medical Care in Diabetes - 2016.  For the purpose of screening for the presence of diabetes:  <5.7%     Consistent with the absence of diabetes  5.7-6.4%  Consistent with increasing risk for diabetes   (prediabetes)  >or=6.5%  Consistent with diabetes  Currently no consensus exists for use of hemoglobin A1C  for diagnosis of diabetes for children.         Review of Systems   Constitutional: Negative for chills and fever.   Respiratory: Negative for shortness of breath.    Cardiovascular: Negative for chest pain, palpitations, orthopnea, claudication and leg swelling.   Gastrointestinal: Negative for diarrhea, nausea and vomiting.   Musculoskeletal: Negative for joint pain.   Skin: Negative for rash.   Neurological: Positive for tingling and sensory change. Negative for dizziness, focal weakness and weakness.   Endo/Heme/Allergies: Bruises/bleeds easily.   Psychiatric/Behavioral: Negative.          Objective:      PHYSICAL EXAM: Apperance: Alert and orient in no distress,well developed, and with good attention to grooming and body habits  Patient presents ambulating in diabetic shoes and inserts with dressing clean and intact to left foot.   Lower Extremity Exam  VASCULAR: Dorsalis pedis and Posterior tibial pulses palpable on left foot.  Capillary fill time <4 sec to toes on left foot.  Minimal edema observed left. Varicosities present left. Skin temperature of the lower extremities is warm to warm, proximal to distal. Hair growth absent left. (--)  lymphangitis or (-) cellulitis noted left.  DERMATOLOGICAL: (-) edema, (-) erythema, (--) malodor, (-) drainage, (-) warmth to left foot. Previous ulcers noted to left medial hallux closed. Pinpoint ulcer noted to left plantar 1st submetatarsal. No erythema, drainage, or increased temp noted.  The dorsum surface of the feet are soft and supple.  The plantar aspects of both feet are dry and scaly. Webspaces 1-3 clean, dry and without evidence of break in skin integrity left.   NEUROLOGICAL: Light touch, sharp-dull, proprioception all present and equal bilaterally.  Protective sensation absent sites as tested with a [place] 5.07 monofilament.   MUSCULOSKELETAL: Muscle strength is 5/5 for foot inverters, everters, plantarflexors, and dorsiflexors. Muscle tone is normal.  Inspection/palpation of bone, joints and muscles unremarkable with the exception of that noted above. Left 5th toe amputation.         Assessment:       Encounter Diagnoses   Name Primary?    Pre-ulcerative calluses - Left Foot Yes    Osteomyelitis of ankle or foot     Type II diabetes mellitus with peripheral circulatory disorder          Plan:   Pre-ulcerative calluses - Left Foot  -     X-Ray Foot Complete Left; Future; Expected date: 09/22/2017    Osteomyelitis of ankle or foot  -     X-Ray Foot Complete Left; Future; Expected date: 09/22/2017    Type II diabetes mellitus with peripheral circulatory disorder  -     X-Ray Foot Complete Left; Future; Expected date: 09/22/2017      I counseled the patient on his conditions, regarding findings of my examination, my impressions, and usual treatment plan.   Ordered left foot x-rays to be taken today.   Home health to continue.   Left foot ulcer dressed with betadine and mepilex pad.   Patient to continue antibiotics as prescribed.   Patient to return in 1 month or sooner if needed.             Nic Castillo DPM  [facility]

## 2018-07-17 NOTE — H&P
Ms. Ilia Eaton is a 61 yo woman with history of breast cancer with known mets to the liver, bone, and brain who presented with shortness of breath and found to have abnormal chest imaging. CT chest showed reticulonodular scarring bilaterally and ggo in the RML and RLL without LAD that did not improve on subsequent imaging despite treatment with a course of abx. Is here today for bronchoscopy.     Exam: alert, oriented, NAD, rrr, no m/r/g, CTAB, respirations non-labored    Labs and imaging reviewed    Will proceed with bronchoscopy

## 2018-07-17 NOTE — TELEPHONE ENCOUNTER
Labs from 7/12/18 reviewed. Labs stable/look good. Hold Tykerb until MUGA. Okay with this being scheduled 7/30/18.

## 2018-07-19 NOTE — PROGRESS NOTES
Patient is scheduled for a MUGA on 7-30 at [facility]. We only do resting MUGA studies. We do rest/stress cardiolite tests. Since looking for EF, assume MUGA. Just verifying which test is needed for patient. Thanks.

## 2018-08-03 NOTE — PROGRESS NOTES
Received confirmation # [id number] when faxed MUGA results to ordering provider (had to fax due to problems with results not crossing over into cardiology tab).

## 2018-08-03 NOTE — PROGRESS NOTES
Received return call from patient. Advised of results per provider note & ok to restart Tykerb. Verbalized understanding & appreciation.

## 2018-08-03 NOTE — TELEPHONE ENCOUNTER
Spoke with patient regarding MUGA results. She plans to schedule MRI brain soon but is asking if order can be changed to without contrast only due to allergy. States she has previously discussed this problem with provider. She mentions left hip pain. States this started about 3 days ago. Feels she may have pinched nerve as pain starts at hip & shoots down leg. States she prefers to monitor this over the weekend & provide update on monday to this office. Will forward to provider.

## 2018-08-03 NOTE — PROGRESS NOTES
Reviewed PERICO and discussed with Dr. Tricia Hairston. Okay to restart Tykerb. Please let her know.

## 2018-08-06 NOTE — TELEPHONE ENCOUNTER
Meera  MRIs  8/8/18  [facility]   6:45 p arrive 6:15p  Discussed with NP Carren Seip. Patient to proceed to ED to eval for possbile cord compression.

## 2018-08-06 NOTE — ED PROVIDER NOTES
HPI Comments: 62 y.o. female with past medical history significant for breast cancer w/ mets to brain and bone who presents from home with chief complaint of numbness. Pt reports she walked 5 miles 1 week ago and had mild burning neck pain at that time. She now c/o numbness over her entire L leg w/ \"tingling on the bottom\" of her L foot and pain behind her L knee for the last 5 days. Pt notes her sx have decreased her mobility. She states she has prior hx of breast cancer w/ mets to her spine and brain; she had radiation of her T9-T10 spine and gamma knife surgery on her brain in the past. Pt notes her \" last 2 head scans were clear\", but an outpatient brain MRI has been ordered. Pt denies low back pain. There are no other acute medical concerns at this time. PCP: Sharyn Hatchet, MD  Oncology: Severiano Ip, MD    Note written by Nain Bhatia, as dictated by Soto Hebert MD 5:47 PM    The history is provided by the patient. Past Medical History:   Diagnosis Date    Breast CA ([address]     Metastatic cancer [facility])        Past Surgical History:   Procedure Laterality Date    BREAST SURGERY PROCEDURE UNLISTED Left 2004    mastectomy    HX GYN  2010    ovaries removed    HX VASCULAR ACCESS Right     portacath         Family History:   Problem Relation Age of Onset    Hypertension Mother     Cancer Father      mesothelioma    Cancer Maternal Grandfather 76     Bladder       Social History     Social History    Marital status:      Spouse name: N/A    Number of children: N/A    Years of education: N/A     Occupational History    Not on file.      Social History Main Topics    Smoking status: Never Smoker    Smokeless tobacco: Never Used    Alcohol use Yes      Comment: 3-4 drinks/month    Drug use: No    Sexual activity: No     Other Topics Concern    Not on file     Social History Narrative         ALLERGIES: Pcn [penicillins] and Contrast agent [iodine]    Review of Systems Constitutional: Negative for chills and fever. Respiratory: Negative for cough and shortness of breath. Cardiovascular: Negative for chest pain and leg swelling. Gastrointestinal: Negative for abdominal pain and nausea. Musculoskeletal: Positive for myalgias and neck pain. Negative for back pain. Neurological: Positive for numbness. All other systems reviewed and are negative. Vitals:    08/06/18 1728   BP: 109/54   Pulse: 98   Resp: 18   Temp: 98.6 °F (37 °C)   SpO2: 96%   Weight: 63.5 kg (140 lb)   Height: 5' 6\" (1.676 m)            Physical Exam   Constitutional: She is oriented to person, place, and time. She appears well-developed and well-nourished. No distress. HENT:   Head: Normocephalic and atraumatic. Eyes: Conjunctivae are normal. No scleral icterus. Neck: Neck supple. No tracheal deviation present. Cardiovascular: Normal rate, regular rhythm, normal heart sounds and intact distal pulses. Exam reveals no gallop and no friction rub. No murmur heard. Pulmonary/Chest: Effort normal and breath sounds normal. She has no wheezes. She has no rales. Abdominal: Soft. She exhibits no distension. There is no tenderness. There is no rebound and no guarding. Musculoskeletal: She exhibits no edema. Neurological: She is alert and oriented to person, place, and time. No foot drop. Decreased sensation over LLE, worst over foot. DTR's 2/2 over knees, 0/0 at ankles. Skin: Skin is warm and dry. No rash noted. Psychiatric: She has a normal mood and affect. Nursing note and vitals reviewed. Note written by Nain Summers, as dictated by Sarah Bella MD 5:47 PM    [facility]      ED Course       Procedures  Assessment and plan    I spoke with Dr. Alvarez Kras and oncology and a review of the MRI showed no significant evidence of cord compression though there were many osseous metastases.  I will discharge home on Decadron 4 b.i.d. per Dr. Rocky Garcia and have the patient followup tomorrow with Dr. Luz Maria Alexander. PROGRESS NOTE:  9:28 PM  Awaiting results of MRI. Will consult [facility] oncology. Believe sx are likely d/t acute sciatica, not cord compression.

## 2018-08-06 NOTE — TELEPHONE ENCOUNTER
HIPAA verified. Stated left leg numb from hip to foot. Stated started 4 days ago when felt pinch in neck. Stated left leg feels cooler than right. Stated does not feel that has blood clot. \"I don't have a positive va's sign. \"  Stated has to sit down after walking short distances due to difficulty bearing weight. Advised would forward to provider for review.

## 2018-08-06 NOTE — TELEPHONE ENCOUNTER
Discussed with Dr. Jordan Carlton and eric for brain MRI WO contrast - updated orders. Please let her know.

## 2018-08-06 NOTE — ED TRIAGE NOTES
Pt arrives with c/o of left sided hip pain and is complaining of numbness and tingling sensation to the left leg that has been going on for the last 5 days. Pt denies any injury or trauma to back; Was sent by oncologist to potentially have mri done pt is currently taking oral chemo medication, pt denies any loss of bladder or bowel.

## 2018-08-06 NOTE — TELEPHONE ENCOUNTER
Spoke with ALEXANDRE. Requested MRIs stat.     RN requested call back while  checked sites and advised [id number] is preferred    ( 5 min)

## 2018-08-06 NOTE — TELEPHONE ENCOUNTER
HIPAA verified. Advised of NP note. RN to return call by 5p with appointments or patient will proceed to ED.

## 2018-08-06 NOTE — TELEPHONE ENCOUNTER
STAT MRI C/T/L spine to be obtained today. If unable to schedule today or first thing tomorrow morning, need ED evaluation. Metastatic breast cancer and concern for bony metastasis/rule out cord compression but sounds as if may be secondary to an injury.

## 2018-08-07 NOTE — PROGRESS NOTES
Called by [facility] ER    Patient has unilateral leg numbness and sciatica like symptoms  Has no objective weakness per ER physician  MRI discussed and to be reviewed with radiology.  Does not look like cord compression  Suggested starting Dexamethasone 4 mg BID X 4 days and follow up with Dr. Corrales Course if no neurologic symptoms

## 2018-08-07 NOTE — TELEPHONE ENCOUNTER
Body scans looked good  MRIs need to be reviewed by Rad/onc  Pt needs to f/u with them to see if any treatment needed for brain or spine.

## 2018-08-07 NOTE — ED NOTES
Dr. Andres Postin at bedside discharging patient; instructions reviewed by provider. Patient exited ED prior to RN reassessment.

## 2018-08-08 NOTE — TELEPHONE ENCOUNTER
HIPAA verified. Stated Dr. Duarte Baker advised patient to follow with Dr. Baron Soto and does not need neurosurgery office visit. Patient with many questions about scan reports. Advised office visit to review. Reviewed recommendation to see rad onc. Patient stated would like to see if steroids help with symptoms. Requested office visit 8/15/18. Gave 12 appt. Patient to call with any questions.   To PSR for appt

## 2018-08-08 NOTE — TELEPHONE ENCOUNTER
HIPAA verified. Stated spoke with Dr. Cande Capellan office and is waiting for call back. Stated recd steroids in ED and wanted to know if that is enough for symptoms of \"pinched nerve\". Wanted to know if ok to go to chiropractor. RN advised to hold on chiropractor until review by provider. Patient wanted to know if tumor is causing numbness and tingling in legs. Advised would forward to provider for review. Is aware that will be referred to Rad Onc for eval.  Support given.

## 2018-08-08 NOTE — TELEPHONE ENCOUNTER
HIPAA verified. Advised of provider note. Stated would like MRIs reviewed by Dr. Yessenia Gutierrez. RN advised would fax report to his office. Patient will call back to coordinate Rad Onc after she speaks with Dr. Yessenia Gutierrez. Support given.     Fax:  [phone]

## 2018-08-09 NOTE — TELEPHONE ENCOUNTER
Call returned. HIPAA verified. Stated does not understand information from neurosurgery office. RN advised that plan is for referral to Rad Onc as soon as possible for eval prior to seeing Dr. Carolee Closs. RN advised would call neurosurgery office to request note for Dr. Carolee Closs to review. Patient verbalized understanding and thanked for call.

## 2018-08-14 NOTE — TELEPHONE ENCOUNTER
Spoke with patient. HIPAA verified. States pain is to left  leg/hip. She has been taking hydrocodone/acet. 1/2 tab every 4 hours as needed. States this \"makes me loopy\" and reports not effective pain relief. She agrees to try taking whole tab this evening. She is scheduled for office visit tomorrow at noon. Will update provider.

## 2018-08-14 NOTE — TELEPHONE ENCOUNTER
Patient called and stated that she started radiation today and is in a lot of pain. Patient stated that she would like to know if something can be called in to help.  # [phone]

## 2018-08-15 NOTE — PROGRESS NOTES
Ruslan Davis is a 62 y.o. female here today for follow up of breast cancer. States severe pain to left hip/leg. States from hip to foot is numb & painful. States hydrocodone/acet is not effective for pain relief. Asking for new pain medication.

## 2018-08-15 NOTE — PROGRESS NOTES
[facility]  Medical Oncology at [facility]    Hematology / Oncology Progress Note          Reason for Visit:   Giselle Flores is a 62 y.o. female who is seen in office f/u for metastatic breast cancer to brain. Diagnosis: Metastatic breast cancer to liver and bone, now brain    TREATMENT COURSE: kadcyla started 6/17 and stopped 10/17.    4/2004, s/p L mastectomy with Dr. Kash Matthew at [facility]. Stage IIB, T2N1M0, 3/39 LN +, ER negative, W[place] +, HER 2 +, was enrolled on NSABP B-31 trial with adriamycin/cytoxan q 3 weeks x 4 followed by taxol weekly with herceptin x 12 then completion of a year of herceptin. States she did not receive XRT. Did receive 5 years of tamoxifen from 2945-1005, which she tolerated well. In 2009 she noticed a chest wall mass. 11/23/10 FNA of chest wall mass showed ER + > 90%, WY + > 90%, MIB 50%, HER 2 + by FISH ratio 6.29. S/p BSO 12/27/10. Received zometa from 2010 to 2011. In 2011, she was started on q 3 week trastuzumab and exemestane, but states she took the exemestane sparingly due to joint pains and body aches. Has not taken any AI since 2013. Briefly took anastrozole in 5/2014. Stopped anastrozole 1/1/15 due to continued body and joint aches. Started Herceptin 1/9/17, stopped 4/25/17 due to progressive disease noted on scans  Started Kadcyla 3.6 mg/kg every 21 days on 6/1/17, reduced to 3 mg/kg due to side effects on 6/26/17  Tykerb/ xeloda started 3/18      History of Present Illness:     Ms. Damian Mathew is a 63 y/o female with metastatic breast cancer ER+ HER2+ on tykerb/ xeloda since 3/18. Pt has left leg/ hip pain and pt had MRIs C/T/L spine. Pt stopped tykerb and then restarted it. Pain is worse and hydrocodone not working. Wants oxycodone. Pt thinks tykerb may be causing lung changes/ SHINE. CT chest better but pt stopped tykerb for 10 days. MRIs ? Worse/ symptoms worse. No CT A/P done.        Last visit: 2Hx of left temporal lobe brain met resection and gamma knife 3/21/18. Her last brain MRI was good in 5/18. CTs in May were stable except for ? Infectious/ inflammatory changes in lungs. She went to see Brenda Goff, Pulmonology, who started her on nebulizer treatments. This has helped with exertional dyspnea. She is having another CT chest on Thursday per Dr. Juni Joshi and additional work up as indicated. She does note that her breathing is a little better but that dry cough persists. Her appetite has been good. Energy is stable. She has remained active and is walking, doing aerobics, and kayaking. She has some intermittent joint pain with activity. Denies new lumps or bumps. Denies fevers, chills, night sweats. Denies hot flashes. Denies skin changes to palms of hands or soles of feet. Past Medical History:   Diagnosis Date    Breast CA ([facility] [address]     Metastatic cancer [facility])       Past Surgical History:   Procedure Laterality Date    BREAST SURGERY PROCEDURE UNLISTED Left 2004    mastectomy    HX GYN  2010    ovaries removed    HX VASCULAR ACCESS Right     portacath      Social History   Substance Use Topics    Smoking status: Never Smoker    Smokeless tobacco: Never Used    Alcohol use Yes      Comment: 3-4 drinks/month      Family History   Problem Relation Age of Onset    Hypertension Mother     Cancer Father      mesothelioma    Cancer Maternal Grandfather 76     Bladder     Current Outpatient Prescriptions   Medication Sig    montelukast (SINGULAIR) 10 mg tablet TK 1 T PO  D    oxyCODONE (OXYIR) 5 mg capsule Take 1 Cap by mouth every four (4) hours as needed. Max Daily Amount: 30 mg. Indications: Pain    dexamethasone (DECADRON) 6 mg tablet 1 po bid    capecitabine (XELODA) 500 mg tablet     TYKERB 250 mg tablet     ALPHA LIPOIC ACID PO Take  by mouth two (2) times a day.  OTHER 1 Cap daily. Vitamin B 17    OTHER Green foods complex.   2 tabs daily    OTHER CBD oil, take 40 mg at bedtime    OTHER Indications: Organic Sulfa- Powder mix with liquids  , 4x per day    ascorbic acid, vitamin C, (VITAMIN C) 250 mg tablet Take 250 mg by mouth daily.  MILK THISTLE, BULK, by Does Not Apply route.  IODINE PO Take  by mouth.  BETA 1,3 GLUCAN, BULK, 3 Tabs by Does Not Apply route daily.  OTHER,NON-FORMULARY, Mushroom extract twice daily; Crocifurous extract    TURMERIC, BULK, by Does Not Apply route daily.  cholecalciferol (VITAMIN D3) 1,000 unit tablet Take 4,000 Units by mouth daily.  multivitamin (ONE A DAY) tablet Take 1 tablet by mouth daily.  coenzyme q10 (CO Q-10) 10 mg cap Take  by mouth daily.  LACTOBAC CMB #3/FOS/PANTETHINE (PROBIOTIC & ACIDOPHILUS PO) Take  by mouth daily.  magnesium 250 mg tab Take 500 mg by mouth daily.  fluticasone (FLONASE) 50 mcg/actuation nasal spray USE 2 SPRAYS IN EACH NOSTRIL HS     No current facility-administered medications for this visit. Allergies   Allergen Reactions    Pcn [Penicillins] Anaphylaxis    Contrast Agent [Iodine] Shortness of Breath and Itching     Patient reported during phone conversation on 4/18/17. Review of Systems: A complete review of systems was obtained, negative except as described above. Physical Exam:     Visit Vitals    /70    Pulse (!) 101    Temp 98.2 °F (36.8 °C) (Oral)    Resp 16    Ht 5' 6\" (1.676 m)    Wt 141 lb (64 kg)    SpO2 98%    BMI 22.76 kg/m2     ECOG PS: 1  General: No distress  Eyes: conj clear  HENT:  atraumatic  Neck: Supple  Respiratory: CTAB, normal respiratory effort  CV: Normal rate, regular rhythm  GI: Soft, nontender, nondistended  MS: ambulatory  Skin: warm, dry  Neuro/Psych: Alert, oriented, appropriate affect, normal judgment/insight      Results:     Lab Results   Component Value Date/Time    WBC 6.2 08/06/2018 06:00 PM    HGB 12.7 08/06/2018 06:00 PM    HCT 37.9 08/06/2018 06:00 PM    PLATELET 403 42/57/1511 06:00 PM    MCV 96.2 08/06/2018 06:00 PM    ABS.  NEUTROPHILS 4.3 08/06/2018 06:00 PM    Hemoglobin (POC) 12.0 02/19/2018 02:56 PM     Lab Results   Component Value Date/Time    Sodium 138 08/06/2018 06:00 PM    Potassium 4.4 08/06/2018 06:00 PM    Chloride 105 08/06/2018 06:00 PM    CO2 25 08/06/2018 06:00 PM    Glucose 97 08/06/2018 06:00 PM    BUN 23 (H) 08/06/2018 06:00 PM    Creatinine 0.76 08/06/2018 06:00 PM    GFR est AA >60 08/06/2018 06:00 PM    GFR est non-AA >60 08/06/2018 06:00 PM    Calcium 8.9 08/06/2018 06:00 PM     Lab Results   Component Value Date/Time    Bilirubin, total 0.3 08/06/2018 06:00 PM    ALT (SGPT) 43 08/06/2018 06:00 PM    AST (SGOT) 69 (H) 08/06/2018 06:00 PM    Alk. phosphatase 130 (H) 08/06/2018 06:00 PM    Protein, total 6.9 08/06/2018 06:00 PM    Albumin 3.3 (L) 08/06/2018 06:00 PM    Globulin 3.6 08/06/2018 06:00 PM     Brain MRI without contrast 2/16/18: IMPRESSION: 1.3 cm left posterior temporal lobe mass with surrounding vasogenic  edema likely represents a metastases. No other lesions are identified. If  patient is willing, the patient could return for contrast-enhanced sequences. Brain MRI with contrast 2/18/18  IMPRESSION:  Left and right temporal lobe ring-enhancing masses, most consistent  with metastatic disease.     CT C/A/P 2/18/18  IMPRESSION:   1. Nonspecific reticular nodular densities throughout the lungs may reflect  infectious or inflammatory process although metastatic neoplastic disease is not  excluded.     2. Stable bony metastases status post left mastectomy and left implant  reconstruction.     3. No other sites of suspected metastatic disease identified within the thorax,  abdomen, or pelvis.       Assessment and Recommendations:   Zainab Bhagat is a 62 y.o. female with metastatic breast cancer admitted with new brain mass. 1. Metastatic breast cancer to brain ER + Her2 + with brain mets/ liver/ bone mets. initially treated with left mastectomy and adjuvant chemotherapy, no radiation.    She was treated with adjuvant endocrine therapy, but was diagnosed recurrence in Nov 2010 with Her2 positive and ER/MA positive disease. Has been off of treatment with Kadcyla since Oct 2017 per patient request.   Pt recently in hospital with brain mets post neurosurgery. Did well. Gamma knife for 3/21/18. CT C/A/P revealed stable bony metastasis and nodular opacities in lungs ? Inflammatory. F/u CTs in 5/18 show continued nonspecific lung changes otherwise stable cancer. She saw Pulmonology for this. F/u CT chest 7/18 shows improvement in lungs. No CT A/P done 7/18. MRI of C/T/L spine done for back pain/ symptoms. MRIs not compared to prior MRIs/ compared to 7/18 CTs so unclear if disease progression. Pt has been seen by Rad/onc and is getting spine XRT   Pt has pain left hip/ leg/ hip to fott is numb/ painful. Hydrocodone not effective. Start oxycodone. She continues to do well with tykerb/xeloda which was initiated in April 2018. Pt will stop now due to ? Side effects and ? Effectiveness. Brain MRI 8/18 also done with ? Increase flair and pt needs to see gamma knife for f/u. She has had some elevated LFT's, likely secondary to tykerb. Stable last labs. Monitor. ECHO 4/18 had low EF and MUGA good. 2. Right temporal lobe met metastatic breast cancer. Resection/ gamma knife completed. Most recent brain MRI August and pt will f/u with gamma knife. 3. Dyspnea, intermittent and dry cough due to inflammation/ ? infection. Slightly improved on CT 7/18 today per report. Seeing pulmonary. 4. Hip pain/ left leg pain due to bone mets. Getting XRT now. Stop hydrocodone. Start oxycodone 5 mg # 60. F/u here in 2 weeks. Call if questions.       Signed By: Mellisa Armenta, DO     August 15, 2018

## 2018-08-15 NOTE — PROGRESS NOTES
Call to Dr. Shaila Paige @ [phone] to obtain notes. Transferred to [employer]. Request for radiation:  Evaluate active bony metastasis, T4 and T8-9 MRI abnormalities for possible XRT. Consult note to be faxed.

## 2018-08-20 NOTE — TELEPHONE ENCOUNTER
HIPAA verified. Stated new onset left eye blurry vision which started Thursday. Stated vision improves during day. Denied headache/neurological changes. Advised would forward to provider for recommendation. Recent brain MRI faxed to Dr. Steven Jones for review.

## 2018-08-20 NOTE — TELEPHONE ENCOUNTER
HIPAA verified. Advised of provider recommendation. Patient would like to see Dr. Jenniffer Gotti as soon as possible. Would like Dr. Jenniffer Gotti to review brain MRI also. RN advised would follow with Dr. Henry Moore office in am for appt. Patient aware to proceed to ED for any change in neuro status. Support given.

## 2018-08-21 NOTE — TELEPHONE ENCOUNTER
HIPAA verified. Advised of appt today with Dr. Charlotte Perez. Verbalized understanding and thanked for call.

## 2018-08-21 NOTE — TELEPHONE ENCOUNTER
Spoke with Yanci Villareal. Appt today @ 2:30p  [address]  [facility]  [id number]

## 2018-08-26 NOTE — PROGRESS NOTES
Case d/w dr Kaylan Sanchez / [facility] [place] knife and pt will need gamma knife perhaps 9/5/18  Tell pt slight increase in liver lesions  Plan was to stop tykerb/ xeloda anyway  Will need to discuss switch in therapy after gamma knife

## 2018-08-28 NOTE — PROGRESS NOTES
Call to patient. HIPAA verified. Gamma knife 9/5/18. To see opthalmology Wednesday as left eye is still drooping. Stated wants to see chiropractor for ongoing back and neck pain. Stated is very anxious and can't leave house of work due to this. Stopped oral chemo on 8/15/18. Reviewed patient's pain medication and encouraged to take as directed as stated was taking only 1 or 2 daily. Patient verbalized understanding and thanked for call.

## 2018-08-30 NOTE — TELEPHONE ENCOUNTER
HIPAA verified. Stated has nausea upon waking daily this week and would like anti-emetic. Denied other sx. To provider for review. Support given. See refill request for pain medication.

## 2018-08-30 NOTE — TELEPHONE ENCOUNTER
Requested Prescriptions     Pending Prescriptions Disp Refills    oxyCODONE (OXYIR) 5 mg capsule 60 Cap 0     Sig: Take 1 Cap by mouth every four (4) hours as needed. Max Daily Amount: 30 mg.  Indications: Pain

## 2018-09-04 NOTE — TELEPHONE ENCOUNTER
HIPAA verified. Stated ongoing double vision is causing nausea not controlled with zofran. To have gamma knife tomorrow. Would like another anti-emetic if ok. To provider. Encouraged rest and po fluids.

## 2018-09-07 NOTE — Clinical Note
Skipped today's appt since results not back. Dr. Cerda December to communicate with Dr. Anne Nunes Would Palliative be able to help with symptoms?

## 2018-09-10 NOTE — PROGRESS NOTES
[employer] Medical Oncology at [facility]  
Nurse Navigator Note Supportive call to patient. She wonders why the NN is calling instead of LAVINIA Albarado. NN reinforced role and support offered. Pt reports she is awaiting a call from Dr. Crow White with the results of her spinal tap that was performed on Friday. Dr. Crow White plans to communicate with Dr. Nicolle Dash as well. Patient reports her nausea is unchanged. She continues to have double vision. They hope radiation will improve her symptoms. There is a plan for whole brain radiation, but it is on hold until the results of her spinal tap. Patient states she is able to move around her house slowly and does not require equipment. She often has family staying with her or checking in on a routine basis. Family help with transporting patient to appointments. She denies barriers at this time.

## 2018-09-13 NOTE — PROGRESS NOTES
Call from Dr Danni Gee Pt's brain MRI is much worse and pt needs whole brain radiation To get this at [facility]. Pt had CSF analysis which showed atypical cells and pt is to have another LP soon to r/o LM disease Pt will f/u here once this is back Get path and records from gamma knife team 
Navigator help in following pt/ support

## 2018-09-14 PROBLEM — J18.9 PNEUMONIA: Status: ACTIVE | Noted: 2018-01-01

## 2018-09-14 PROBLEM — R06.00 DYSPNEA: Status: ACTIVE | Noted: 2018-01-01

## 2018-09-14 PROBLEM — I26.99 ACUTE PULMONARY EMBOLISM (HCC): Status: ACTIVE | Noted: 2018-01-01

## 2018-09-14 NOTE — CONSULTS
[facility] [place] at [address], [address] [facility] W: [phone]  F: [phone] Reason for Visit:  
Corin Altamirano is a 62 y.o. female who is seen in consultation at the request of Dr. Chelsey Zaldivar for evaluation of metastattic breast cancer with brain mets; recs for anticoagulation and radiation. Hematology / Oncology Treatment History:  
Diagnosis: Metastatic breast cancer to liver and bone, now brain 
  
TREATMENT COURSE: Mike Miguel started 6/17 and stopped 10/17.   
4/2004, s/p L mastectomy with Dr. Shawn Alvarado at [facility]. Stage IIB, T2N1M0, 3/39 LN +, ER negative, WA +, HER 2 +, was enrolled on NSABP B-31 trial with adriamycin/cytoxan q 3 weeks x 4 followed by taxol weekly with herceptin x 12 then completion of a year of herceptin. States she did not receive XRT. Did receive 5 years of tamoxifen from 3435-1155, which she tolerated well. In 2009 she noticed a chest wall mass. 11/23/10 FNA of chest wall mass showed ER + > 90%, WA + > 90%, MIB 50%, HER 2 + by FISH ratio 6.29. S/p BSO 12/27/10. Received zometa from 2010 to 2011. In 2011, she was started on q 3 week trastuzumab and exemestane, but states she took the exemestane sparingly due to joint pains and body aches. Has not taken any AI since 2013. Briefly took anastrozole in 5/2014. Stopped anastrozole 1/1/15 due to continued body and joint aches. Started Herceptin 1/9/17, stopped 4/25/17 due to progressive disease noted on scans Started Kadcyla 3.6 mg/kg every 21 days on 6/1/17, reduced to 3 mg/kg due to side effects on 6/26/17 Tykerb/ xeloda started 3/18 History of Present Illness:  
 
Ms Jagjit Patel was admitted from the ED on 9/14/2018 after being seen in the [place] [address] clinic for whole brain XRT and directed to the ED with c/o worsening SOB present x 2-3 months but worsening over the last week  associated with heavy feeling in chest and elevated HR.   She reports a rapid deterioration in the past few weeks. 6 weeks ago she was shopping in [place]. By 3 weeks ago, she was having significant SHINE. Now she has dyspnea even at rest in bed. \"It's scary not being able to breath. \"  She reports minimal improvement since admission. She has a history of metastatic breast cancer, reviewed above. She reports stopping her most recent therapy about 5-6 weeks ago. Past Medical History:  
Diagnosis Date  Breast CA ([facility] [address]  Metastatic cancer ([facility] [address] Past Surgical History:  
Procedure Laterality Date  BREAST SURGERY PROCEDURE UNLISTED Left 2004  
 mastectomy  HX GYN  2010  
 ovaries removed  HX VASCULAR ACCESS Right   
 portacath Social History Substance Use Topics  Smoking status: Never Smoker  Smokeless tobacco: Never Used  Alcohol use Yes Comment: 3-4 drinks/month Family History Problem Relation Age of Onset  Hypertension Mother  Cancer Father   
  mesothelioma  Cancer Maternal Grandfather 76 Bladder Current Facility-Administered Medications Medication Dose Route Frequency  sodium chloride (NS) flush 5-10 mL  5-10 mL IntraVENous Q8H  
 sodium chloride (NS) flush 5-10 mL  5-10 mL IntraVENous PRN  
 sodium chloride (NS) flush 5-10 mL  5-10 mL IntraVENous Q8H  
 sodium chloride (NS) flush 5-10 mL  5-10 mL IntraVENous PRN  
 0.9% sodium chloride infusion  100 mL/hr IntraVENous CONTINUOUS  
 enoxaparin (LOVENOX) injection 60 mg  1 mg/kg SubCUTAneous Q12H  
 acetaminophen (TYLENOL) tablet 650 mg  650 mg Oral Q6H PRN  
 oxyCODONE IR (ROXICODONE) tablet 5 mg  5 mg Oral Q4H PRN  prochlorperazine (COMPAZINE) injection 10 mg  10 mg IntraVENous Q6H PRN  
 naloxone (NARCAN) injection 0.4 mg  0.4 mg IntraVENous PRN  
 docusate sodium (COLACE) capsule 100 mg  100 mg Oral BID  cefTRIAXone (ROCEPHIN) 1 g in 0.9% sodium chloride (MBP/ADV) 50 mL  1 g IntraVENous Q24H  azithromycin (ZITHROMAX) 500 mg in 0.9% sodium chloride (MBP/ADV) 250 mL  500 mg IntraVENous Q24H  
 zolpidem (AMBIEN) tablet 5 mg  5 mg Oral QHS  promethazine (PHENERGAN) tablet 25 mg  25 mg Oral Q6H PRN  
 albuterol-ipratropium (DUO-NEB) 2.5 MG-0.5 MG/3 ML  3 mL Nebulization Q4H PRN  
 montelukast (SINGULAIR) tablet 10 mg  10 mg Oral DAILY  metroNIDAZOLE (FLAGYL) IVPB premix 500 mg  500 mg IntraVENous Q12H  
 dexamethasone (DECADRON) tablet 4 mg  4 mg Oral Q12H Allergies Allergen Reactions  Pcn [Penicillins] Anaphylaxis  Contrast Agent [Iodine] Shortness of Breath and Itching Patient reported during phone conversation on 4/18/17. Review of Systems: A complete review of systems was obtained, negative except as described above. Physical Exam:  
 
Visit Vitals  /77  Pulse (!) 110  Temp 97.7 °F (36.5 °C)  Resp (!) 32  
 Ht 5' 6\" (1.676 m)  Wt 135 lb 3.2 oz (61.3 kg)  SpO2 96%  BMI 21.82 kg/m2 ECOG PS: 4 General: No distress, ill appearing Eyes: PERRLA, anicteric sclerae HENT: Atraumatic with normal appearance of ears and nose; OP clear Neck: Supple; no thyromegaly Lymphatic: No cervical, supraclavicular, or axillary adenopathy Respiratory: CTAB, normal respiratory effort CV: Normal rate, regular rhythm, no murmurs, no peripheral edema GI: Soft, nontender, nondistended, no masses, no hepatomegaly, no splenomegaly Skin: No rashes, ecchymoses, or petechiae. Normal temperature, turgor, and texture. Neuro/Psych: Alert, oriented, appropriate affect, normal judgment/insight Results:  
 
Lab Results Component Value Date/Time WBC 9.6 09/15/2018 02:24 AM  
 HGB 13.5 09/15/2018 02:24 AM  
 HCT 39.5 09/15/2018 02:24 AM  
 PLATELET 594 (H) 05/88/0402 02:24 AM  
 MCV 93.6 09/15/2018 02:24 AM  
 ABS. NEUTROPHILS 8.8 (H) 09/15/2018 02:24 AM  
 Hemoglobin (POC) 12.0 02/19/2018 02:56 PM  
 
Lab Results Component Value Date/Time Sodium 138 09/15/2018 02:24 AM  
 Potassium 4.2 09/15/2018 02:24 AM  
 Chloride 103 09/15/2018 02:24 AM  
 CO2 24 09/15/2018 02:24 AM  
 Glucose 107 (H) 09/15/2018 02:24 AM  
 BUN 11 09/15/2018 02:24 AM  
 Creatinine 0.59 09/15/2018 02:24 AM  
 GFR est AA >60 09/15/2018 02:24 AM  
 GFR est non-AA >60 09/15/2018 02:24 AM  
 Calcium 9.1 09/15/2018 02:24 AM  
 
Lab Results Component Value Date/Time Bilirubin, total 0.6 09/14/2018 12:18 PM  
 ALT (SGPT) 127 (H) 09/14/2018 12:18 PM  
 AST (SGOT) HEMOLYZED,RECOLLECT REQUESTED 09/14/2018 12:18 PM  
 Alk. phosphatase 163 (H) 09/14/2018 12:18 PM  
 Protein, total 7.4 09/14/2018 12:18 PM  
 Albumin 3.1 (L) 09/14/2018 12:18 PM  
 Globulin 4.3 (H) 09/14/2018 12:18 PM  
 
Lab Results Component Value Date/Time TSH 4.78 (H) 02/16/2018 09:11 PM  
 
Lab Results Component Value Date/Time D-dimer 2.04 (H) 09/14/2018 12:18 PM  
 
Lab Results Component Value Date/Time CA 27.29 401.0 (H) 07/12/2018 10:21 AM  
 
9/14/2018 XR CHEST IMPRESSION: 
Right greater than left basilar atelectasis/consolidation. Likely right-sided 
effusion. [place] 9/14/2018 NM lung V/Q PERF IMPRESSION: 
Abnormal ventilation and perfusion; indeterminate/intermediate probability for 
pulmonary embolism. 
  
 
9/14/2018 DUPLEX LE Bilateral: negative 9/14/2018 CTA CHEST W or WO CONT: 
1. No Pulmonary Embolus. 2. Progression of reticular nodular lung disease. 3. New pleural effusions. 4. Stable osseous metastases. Assessment and Recommendations:  
 
1) Acute on chronic dyspnea Rapidly progressive over the past month. Uncertain etiology. No PE on CT. Pulmonary following, concern for aspiration pneumonia, on abx. Not a typical appearance for metastatic disease, though this is possible as well. Not a typical AE seen with her recent chemotherapy, and the progression despite stopping these medications 5-6 weeks ago would suggest they are not the cause. 2) Metastatic breast cancer ER+ HER2+ with brain mets/liver/bone mets Follows with Dr. Harinder Garvey. Has had multiple lines of therapy. Most recently treatment with Tykerb/Xeloda which was discontinued 5-6 weeks ago. Her cancer is not curable and management is with palliative intent. If her performance status does not improve, she may not be a candidate for further systemic therapy, which I reviewed with her today. She will need close follow up with Dr. Harinder Garvey on discharge. 3) Brain Mets 3/21/18 S/p left temporal lobe brain met resection and gamma knife Currently undergoing whole brain XRT under the care of Dr Gina Dent;  received 3/14 treatments on (9/14). Therapy now on hold due to her acute issues. 4) Bone Mets S/p Palliative XRT (L3-S1) 8/14-8/20 5) Abnormal LP Atypical cells noted on LP performed by Dr Gera Gutierres;  
Concern for leptomeningeal disease Plan for repeat LP at [address] [place] [address] with Dr Gera Gutierres to further eval  
 
 
 
 
Signed By: Kerrie Andino MD

## 2018-09-14 NOTE — IP AVS SNAPSHOT
Anna Richard 
 
 
 [address] 
[phone] Patient: Nata To MRN: [id number] :1960 About your hospitalization You were admitted on:  2018 You last received care in the:  [facility] [address] [facility] [address] You were discharged on:  2018 Why you were hospitalized Your primary diagnosis was:  Dyspnea Your diagnoses also included:  Breast Cancer, Stage 4 (Hcc), Liver Metastases (Hcc), Bone Metastases (Hcc), Brain Metastasis (Hcc), Cough, Pneumonia, Acute Pulmonary Embolism (Hcc), Sinus Tachycardia, Tachypnea, Anxiety, Brain Mass, Conteh (Dyspnea On Exertion), Fatigue Follow-up Information Follow up With Details Comments Contact Info Saundra Yan MD Go on 2018 Hospital follow-up scheduled at 12:30pm ( If you have questions or need to reschedule please call [address] [place] [address] [address] [place] [address] [phone] Michelle Wood MD Schedule an appointment as soon as possible for a visit If symptoms worsen [address] [facility] [address] 
[phone] Taylor Cross DO Call office will call you with follow up appt; please make sure you have an appt within 1 week [address] [place] [address] [address] 
[phone] Discharge Orders None A check sherif indicates which time of day the medication should be taken. My Medications START taking these medications Instructions Each Dose to Equal  
 Morning Noon Evening Bedtime ALPRAZolam 0.5 mg tablet Commonly known as:  Lemon Malling Your last dose was:   Take 1 Tab by mouth four (4) times daily as needed for Anxiety for up to 30 days. Max Daily Amount: 2 mg. 0.5 mg  
    
   
   
   
  
 budesonide 0.5 mg/2 mL Nbsp Commonly known as:  PULMICORT Your last dose was:  8 am  2 mL by Nebulization route two (2) times a day for 30 days. 500 mcg  
    
  
   
   
   
  
  
 cloNIDine HCl 0.1 mg tablet Commonly known as:  CATAPRES Your last dose was:  9 am 9/17 Take 1 Tab by mouth two (2) times a day for 30 days. 0.1 mg  
    
  
   
   
  
   
  
 guaiFENesin  mg ER tablet Commonly known as:  Prakash & Prakash Your last dose was:  9 am 9/17 Take 1 Tab by mouth every twelve (12) hours for 10 days. 600 mg  
    
  
   
   
   
  
  
 pantoprazole 40 mg tablet Commonly known as:  PROTONIX Start taking on:  9/18/2018 Your last dose was:  9 am 9/17 Take 1 Tab by mouth Daily (before breakfast) for 30 days. 40 mg CHANGE how you take these medications Instructions Each Dose to Equal  
 Morning Noon Evening Bedtime  
 montelukast 10 mg tablet Commonly known as:  MISTIULAIR What changed:  Another medication with the same name was removed. Continue taking this medication, and follow the directions you see here. Your last dose was:  9 am 9/17 Take 10 mg by mouth daily. 10 mg  
    
  
   
   
   
  
 oxyCODONE IR 5 mg immediate release tablet Commonly known as:  Ming Cameron What changed:  Another medication with the same name was removed. Continue taking this medication, and follow the directions you see here. Your last dose was:  Not given while in the hospital - resume as needed at discharge Take 5 mg by mouth daily as needed for Pain. 5 mg CONTINUE taking these medications Instructions Each Dose to Equal  
 Morning Noon Evening Bedtime  
 acetaminophen 500 mg tablet Commonly known as:  TYLENOL Your last dose was:  9/15 Take 500 mg by mouth every six (6) hours as needed for Pain. 500 mg  
    
   
   
   
  
 dexamethasone 2 mg tablet Commonly known as:  DECADRON Your last dose was:  9 am 9/17 Take 4 mg by mouth two (2) times daily (with meals). 4 mg FIORICET -40 mg per capsule Generic drug:  butalbital-acetaminophen-caff Your last dose was:  Not given while in the hospital - resume as needed at discharge Take 1 Cap by mouth two (2) times daily as needed for Headache. 1 Cap  
    
   
   
   
  
 ibuprofen 200 mg tablet Commonly known as:  MOTRIN Your last dose was:  Not given while in the hospital - resume as needed at discharge Take 400 mg by mouth every eight (8) hours as needed for Pain. 400 mg PROBIOTIC & ACIDOPHILUS PO Your last dose was:  9 am 9/17 Take 1 Cap by mouth daily. 1 Cap  
    
  
   
   
   
  
 promethazine 25 mg tablet Commonly known as:  PHENERGAN Your last dose was:  9/16 Take 25 mg by mouth every six (6) hours as needed for Nausea. 25 mg  
    
   
   
   
  
 VITAMIN C 250 mg tablet Generic drug:  ascorbic acid (vitamin C) Your last dose was:  Not given while in the hospital - resume at discharge Take 250 mg by mouth daily. 250 mg Where to Get Your Medications Information on where to get these meds will be given to you by the nurse or doctor. ! Ask your nurse or doctor about these medications ALPRAZolam 0.5 mg tablet  
 budesonide 0.5 mg/2 mL Nbsp  
 cloNIDine HCl 0.1 mg tablet  
 guaiFENesin  mg ER tablet  
 pantoprazole 40 mg tablet Opioid Education Prescription Opioids: What You Need to Know: 
 
Prescription opioids can be used to help relieve moderate-to-severe pain and are often prescribed following a surgery or injury, or for certain health conditions. These medications can be an important part of treatment but also come with serious risks. Opioids are strong pain medicines.  Examples include hydrocodone, oxycodone, fentanyl, and morphine. Heroin is an example of an illegal opioid. It is important to work with your health care provider to make sure you are getting the safest, most effective care. WHAT ARE THE RISKS AND SIDE EFFECTS OF OPIOID USE? Prescription opioids carry serious risks of addiction and overdose, especially with prolonged use. An opioid overdose, often marked by slow breathing, can cause sudden death. The use of prescription opioids can have a number of side effects as well, even when taken as directed. · Tolerance-meaning you might need to take more of a medication for the same pain relief · Physical dependence-meaning you have symptoms of withdrawal when the medication is stopped. Withdrawal symptoms can include nausea, sweating, chills, diarrhea, stomach cramps, and muscle aches. Withdrawal can last up to several weeks, depending on which drug you took and how long you took it. · Increased sensitivity to pain · Constipation · Nausea, vomiting, and dry mouth · Sleepiness and dizziness · Confusion · Depression · Low levels of testosterone that can result in lower sex drive, energy, and strength · Itching and sweating RISKS ARE GREATER WITH:      
· History of drug misuse, substance use disorder, or overdose · Mental health conditions (such as depression or anxiety) · Sleep apnea · Older age (72 years or older) · Pregnancy Avoid alcohol while taking prescription opioids. Also, unless specifically advised by your health care provider, medications to avoid include: · Benzodiazepines (such as Xanax or Valium) · Muscle relaxants (such as Soma or Flexeril) · Hypnotics (such as Ambien or Lunesta) · Other prescription opioids KNOW YOUR OPTIONS Talk to your health care provider about ways to manage your pain that don't involve prescription opioids. Some of these options may actually work better and have fewer risks and side effects. Options may include: · Pain relievers such as acetaminophen, ibuprofen, and naproxen · Some medications that are also used for depression or seizures · Physical therapy and exercise · Counseling to help patients learn how to cope better with triggers of pain and stress. · Application of heat or cold compress · Massage therapy · Relaxation techniques Be Informed Make sure you know the name of your medication, how much and how often to take it, and its potential risks & side effects. IF YOU ARE PRESCRIBED OPIOIDS FOR PAIN: 
· Never take opioids in greater amounts or more often than prescribed. Remember the goal is not to be pain-free but to manage your pain at a tolerable level. · Follow up with your primary care provider to: · Work together to create a plan on how to manage your pain. · Talk about ways to help manage your pain that don't involve prescription opioids. · Talk about any and all concerns and side effects. · Help prevent misuse and abuse. · Never sell or share prescription opioids · Help prevent misuse and abuse. · Store prescription opioids in a secure place and out of reach of others (this may include visitors, children, friends, and family). · Safely dispose of unused/unwanted prescription opioids: Find your community drug take-back program or your pharmacy mail-back program, or flush them down the toilet, following guidance from the Food and Drug Administration (www.fda.gov/Drugs/ResourcesForYou). · Visit www.cdc.gov/drugoverdose to learn about the risks of opioid abuse and overdose. · If you believe you may be struggling with addiction, tell your health care provider and ask for guidance or call [address] [place], [place] [address] at [phone]. Discharge Instructions Patient Discharge Instructions Jerad Jaquez / [id number] : 1960 Admitted 2018 Discharged: 2018 Primary Diagnoses Problem List as of 9/17/2018  Date Reviewed: 7/10/2018 Codes Class Noted - Resolved Sinus tachycardia Tachypnea * (Principal)Dyspnea Pneumonia SHINE (dyspnea on exertion) Brain metastasis ([facility] [address] Cough Brain mass Fatigue Anxiety Bone metastases ([address] Liver metastases ([facility] [address] Breast cancer, stage 4 ([facility] [address] Take Home Medications · It is important that you take the medication exactly as they are prescribed. · Keep your medication in the bottles provided by the pharmacist and keep a list of the medication names, dosages, and times to be taken in your wallet. · Do not take other medications without consulting your doctor. What to do at [facility] Recommended diet: Regular Diet, Clear liquids, advance as tolerated and Comfort feeding Recommended activity: Activity as tolerated If you experience worse symptoms, please follow up with oncology, PCP, pulmonary. Follow-up with your PCP in a few weeks Information obtained by : 
I understand that if any problems occur once I am at home I am to contact my physician. I understand and acknowledge receipt of the instructions indicated above. Physician's or R.N.'s Signature                                                                  Date/Time Patient or Representative Signature                                                          Date/Time [employer] Announcement We are excited to announce that we are making your provider's discharge notes available to you in [employer].   You will see these notes when they are completed and signed by the physician that discharged you from your recent hospital stay. If you have any questions or concerns about any information you see in The [employer]umator, please call the Health Information Department where you were seen or reach out to your Primary Care Provider for more information about your plan of care. Introducing [facility]! Dear Damari Cortez: Thank you for requesting a [employer] account. Our records indicate that you already have an active [employer] account. You can access your account anytime at [email or web address] Did you know that you can access your hospital and ER discharge instructions at any time in The [employer]umator? You can also review all of your test results from your hospital stay or ER visit. Additional Information If you have questions, please visit the Frequently Asked Questions section of the [employer] website at [email or web address] Remember, The Resumator is NOT to be used for urgent needs. For medical emergencies, dial 911. Now available from your iPhone and Android! Introducing Cedrick Streeter As a [facility] patient, I wanted to make you aware of our electronic visit tool called Cedrick Streeter. [facility] [facility] 24/7 allows you to connect within minutes with a medical provider 24 hours a day, seven days a week via a mobile device or tablet or logging into a secure website from your computer. You can access Cedrick Streeter from anywhere in the [place]. A virtual visit might be right for you when you have a simple condition and feel like you just dont want to get out of bed, or cant get away from work for an appointment, when your regular [facility] provider is not available (evenings, weekends or holidays), or when youre out of town and need minor care. Electronic visits cost only $49 and if the [facility] 24/7 provider determines a prescription is needed to treat your condition, one can be electronically transmitted to a nearby pharmacy*. Please take a moment to enroll today if you have not already done so. The enrollment process is free and takes just a few minutes. To enroll, please download the [employer] 24/7 mac to your tablet or phone, or visit [email or web address] to enroll on your computer. And, as an [address] [place] [address] patient with a [employer] account, the results of your visits will be scanned into your electronic medical record and your primary care provider will be able to view the scanned results. We urge you to continue to see your regular [employer] provider for your ongoing medical care. And while your primary care provider may not be the one available when you seek a Sendmybag virtual visit, the peace of mind you get from getting a real diagnosis real time can be priceless. For more information on Sendmybag, view our Frequently Asked Questions (FAQs) at [email or web address]. Sincerely, 
 
Edmar Parker MD 
Chief Medical Officer [facility] Nicole Albino *:  certain medications cannot be prescribed via Sendmybag Unresulted Labs-Please follow up with your PCP about these lab tests Order Current Status MYCOPLASMA AB, IGG/IGM In process CULTURE, BLOOD, PERIPHERAL Preliminary result Providers Seen During Your Hospitalization Provider Specialty Primary office phone Nuno Ledesma MD Emergency Medicine [phone] [facility] Internal Medicine [phone] Kanwal Vargas MD Internal Medicine [phone] Your Primary Care Physician (PCP) Primary Care Physician Office Phone Office Fax Dayanara Chapa [phone] You are allergic to the following Allergen Reactions Pcn (Penicillins) Anaphylaxis Contrast Agent (Iodine) Shortness of Breath Itching Patient reported during phone conversation on 4/18/17. Recent Documentation Height Weight BMI OB Status Smoking Status 1.676 m 61.3 kg 21.82 kg/m2 Medically Induced Never Smoker Emergency Contacts Name Discharge Info Relation Home Work Mobile Jean-Pierre Graham DISCHARGE CAREGIVER [3] Spouse [3] [phone] Patient Belongings The following personal items are in your possession at time of discharge: 
  Dental Appliances: None  Visual Aid: None      Home Medications: None   Jewelry: Necklace  Clothing: At bedside    Other Valuables: Cell Phone Discharge Instructions Attachments/References MEFS - ALPRAZOLAM (XANAX, XANAX XR, ALPRAZOLAM INTENSOL, GABAZOLAMINE) - (BY MOUTH) (ENGLISH) MEFS - BUDESONIDE (PULMICORT FLEX, PULMICORT FLEXHALER, PULMICORT RESPULES) - (BY BREATHING) (ENGLISH) MEFS - CLONIDINE (CATAPRES, KAPVAY, KAPVAY DOSE PACK) - (BY MOUTH) (ENGLISH) MEFS - GUAIFENESIN (ALLFEN, ANTITUSSIN, CHEST CONGESTION RELIEF, CHILDREN'S MUCINEX) - (BY MOUTH) (ENGLISH) MEFS - PANTOPRAZOLE (PROTONIX) - (BY MOUTH) (ENGLISH) Patient Handouts Alprazolam (Xanax, Xanax XR, ALPRAZolam Intensol, Gabazolamine) - (By mouth) Why this medicine is used:  
Treats anxiety and panic disorder. Contact a nurse or doctor right away if you have: · Slow heartbeat, trouble breathing or speaking · Extreme tiredness or weakness · Blistering, peeling, red skin rash · Confusion, problems with coordination or memory Common side effects: 
· Change in appetite or weight · Lightheadedness, drowsiness · Nervousness, restlessness © 2017 [facility] Information is for End User's use only and may not be sold, redistributed or otherwise used for commercial purposes. Budesonide (Pulmicort Flex, Pulmicort Flexhaler, Pulmicort Respules) - (By breathing) Why this medicine is used:  
Prevents asthma attacks. Contact a nurse or doctor right away if you have: · Fever, chills, cough, sore throat, body aches · Eye pain, trouble seeing Common side effects: · Sores or white patches in your mouth or throat · Headache © 2017 [facility] Information is for End User's use only and may not be sold, redistributed or otherwise used for commercial purposes. Clonidine (Catapres, Kapvay, Kapvay Dose Pack) - (By mouth) Why this medicine is used:  
Treats high blood pressure. Also treats ADHD. Contact a nurse or doctor right away if you have: 
· Fast, slow, pounding, or uneven heartbeat · Lightheadedness, dizziness, fainting Common side effects: · Tiredness · Constipation, stomach pain · Dry eyes, mouth, and throat · Headache © 2017 [facility] Information is for End User's use only and may not be sold, redistributed or otherwise used for commercial purposes. Guaifenesin (Allfen, Antitussin, Chest Congestion Relief, Children's Mucinex) - (By mouth) Why this medicine is used: Thins mucus so you can clear it from your head, throat, and lungs. Contact a nurse or doctor right away if you have: · Blood in urine · Pain in side, back, or abdomen Common side effects: · Mild nausea, vomiting © 2017 [facility] Information is for End User's use only and may not be sold, redistributed or otherwise used for commercial purposes. Pantoprazole (Protonix) - (By mouth) Why this medicine is used:  
Treats gastroesophageal reflux disease (GERD), a damaged esophagus, and high levels of stomach acid. Contact a nurse or doctor right away if you have: · Blistering, peeling, red skin rash · Swelling, muscle pain, stiffness, cramps, or twitching · Joint pain, rash on your cheeks or arms that gets worse in the sun · Dark-colored urine, change in how much or how often you urinate · Seizures, dizziness, uneven heartbeat · Severe diarrhea, stomach cramps, fever, weight gain Common side effects: · Mild diarrhea, stomach pain · Headache, tiredness © 2017 [facility] Information is for End User's use only and may not be sold, redistributed or otherwise used for commercial purposes. Please provide this summary of care documentation to your next provider. Signatures-by signing, you are acknowledging that this After Visit Summary has been reviewed with you and you have received a copy. Patient Signature:  ____________________________________________________________ Date:  ____________________________________________________________  
  
Donold Neat Provider Signature:  ____________________________________________________________ Date:  ____________________________________________________________

## 2018-09-14 NOTE — PROCEDURES
[facility] *** FINAL REPORT *** Name: Shanika Rogers 
MRN: [id number]    Inpatient : 1960 HIS Order #: [id number] [address] [facility] Visit #: [id number] Date: 14 Sep 2018 TYPE OF TEST: Peripheral Venous Testing REASON FOR TEST Suspected pulmonary embolism Right Leg:- 
Deep venous thrombosis:           No 
Superficial venous thrombosis:    No 
Deep venous insufficiency:        No 
Superficial venous insufficiency: No 
 
Left Leg:- 
Deep venous thrombosis:           No 
Superficial venous thrombosis:    No 
Deep venous insufficiency:        No 
Superficial venous insufficiency: No 
 
 
INTERPRETATION/FINDINGS 
PROCEDURE:  BILATERAL LE VENOUS DUPLEX. Evaluation of lower extremity veins with ultrasound (B-mode imaging, 
pulsed Doppler, color Doppler). Includes the common femoral, deep 
femoral, femoral, popliteal, posterior tibial, peroneal, and great 
saphenous veins. FINDINGS:  Charlotta Mac scale and color flow duplex images of the veins in 
both lower extremities demonstrate normal compressibility, spontaneous 
 and augmented flow profiles, and absence of filling defects 
throughout the deep and superficial veins in both lower extremities. CONCLUSION: Bilateral lower extremity venous duplex negative for deep 
venous thrombosis or thrombophlebitis. ADDITIONAL COMMENTS I have personally reviewed the data relevant to the interpretation of 
this 
study. TECHNOLOGIST: Dorcas Patterson. Luzmaria Pat Signed: 2018 06:08 PM 
 
PHYSICIAN: Laverne Redd.  DAYANA TEJEDA MD 
Signed: 2018 08:28 AM

## 2018-09-14 NOTE — H&P
[facility] Hospitalist Admission Note NAME:  Teagan Valenzuela :   1960 MRN:  [id number] PCP:  Madi Mitchell MD  
 
Date/Time:  2018 3:16 PM 
 
  
  
Subjective: CHIEF COMPLAINT: SOB HISTORY OF PRESENT ILLNESS:    
Ms. Bessy Mcpherson is a 62 y.o.  female with metastatic breast cancer who presented to the Emergency Department complaining of acute on chronic dyspnea. Patient has been SOB x 3 mo but sx have progressively worsening over the last week and much worse today. Prior w/u has included pulmonology evaluation, bronchoscopy with biopsy. Patient was seen at [facility] clinic and referred to ED as she was barely able to speak in complete sentences. Patient also endorses concomitant fatigue, nausea, anorexia, chest heaviness, palpitations, and non-productive cough. In ED, noted to be tachycardic, CXR showing likely infiltrations, and V/Q scan intermediate probability. We will admit for further work-up and management. Past Medical History:  
Diagnosis Date  Breast CA ([facility] [address]  Metastatic cancer ([facility] [address] Past Surgical History:  
Procedure Laterality Date  BREAST SURGERY PROCEDURE UNLISTED Left 2004  
 mastectomy  HX GYN  2010  
 ovaries removed  HX VASCULAR ACCESS Right   
 portacath Social History Substance Use Topics  Smoking status: Never Smoker  Smokeless tobacco: Never Used  Alcohol use Yes Comment: 3-4 drinks/month Family History Problem Relation Age of Onset  Hypertension Mother  Cancer Father   
  mesothelioma  Cancer Maternal Grandfather 76 Bladder Family hx cannot be fully assessed, due to the admitting conditions Allergies Allergen Reactions  Pcn [Penicillins] Anaphylaxis  Contrast Agent [Iodine] Shortness of Breath and Itching Patient reported during phone conversation on 17. Prior to Admission medications Medication Sig Start Date End Date Taking? Authorizing Provider  
prochlorperazine (COMPAZINE) 10 mg tablet Take 0.5 Tabs by mouth every six (6) hours as needed for up to 7 days. Indications: CANCER CHEMOTHERAPY-INDUCED NAUSEA AND VOMITING 9/7/18 9/14/18  Abida Cabello DO  
oxyCODONE (OXYIR) 5 mg capsule Take 1 Cap by mouth every four (4) hours as needed. Max Daily Amount: 30 mg. Indications: Pain 8/30/18   Zeke Thakur NP  
ondansetron (ZOFRAN ODT) 8 mg disintegrating tablet Take 1 Tab by mouth every eight (8) hours as needed for Nausea. 8/30/18   Zeke Thakur NP  
montelukast (SINGULAIR) 10 mg tablet TK 1 T PO  D 7/24/18   Historical Provider  
fluticasone (FLONASE) 50 mcg/actuation nasal spray USE 2 SPRAYS IN EACH NOSTRIL HS 7/24/18   Historical Provider  
dexamethasone (DECADRON) 6 mg tablet 1 po bid 8/6/18   Mike Oconnell MD  
capecitabine (XELODA) 500 mg tablet  5/8/18   Historical Provider TYKERB 250 mg tablet  5/8/18   Historical Provider ALPHA LIPOIC ACID PO Take  by mouth two (2) times a day. Historical Provider OTHER 1 Cap daily. Vitamin B 17    Historical Provider OTHER Green foods complex. 2 tabs daily    Historical Provider OTHER CBD oil, take 40 mg at bedtime    Historical Provider OTHER Indications: Organic Sulfa- Powder mix with liquids  , 4x per day    Historical Provider  
ascorbic acid, vitamin C, (VITAMIN C) 250 mg tablet Take 250 mg by mouth daily. Historical Provider MILK THISTLE, BULK, by Does Not Apply route. Historical Provider IODINE PO Take  by mouth. Historical Provider BETA 1,3 GLUCAN, BULK, 3 Tabs by Does Not Apply route daily. Historical Provider OTHER,NON-FORMULARY, Mushroom extract twice daily; Crocifurous extract    Historical Provider TURMERIC, BULK, by Does Not Apply route daily. Historical Provider  
cholecalciferol (VITAMIN D3) 1,000 unit tablet Take 4,000 Units by mouth daily. Historical Provider multivitamin (ONE A DAY) tablet Take 1 tablet by mouth daily. Historical Provider  
coenzyme q10 (CO Q-10) 10 mg cap Take  by mouth daily. Historical Provider LACTOBAC CMB #3/FOS/PANTETHINE (PROBIOTIC & ACIDOPHILUS PO) Take  by mouth daily. Historical Provider  
magnesium 250 mg tab Take 500 mg by mouth daily. Historical Provider Review of Systems: 
(bold if positive, if negative) Gen:  fatigueEyes:  ENT:  CVS:  Pulm:  Cough, dyspnea,GI:   
:   
MS:  Skin:  Psych:  Endo:   
Hem:  Renal:   
Neuro:    
  
Objective: VITALS:   
Vital signs reviewed; most recent are: 
 
Visit Vitals  /71  Pulse (!) 116  Temp 97.6 °F (36.4 °C)  Resp 23  
 Ht 5' 6\" (1.676 m)  Wt 61.2 kg (135 lb)  SpO2 93%  BMI 21.79 kg/m2 SpO2 Readings from Last 6 Encounters:  
09/14/18 93% 08/15/18 98% 08/06/18 97% 07/17/18 97% 07/10/18 97% 06/04/18 98% No intake or output data in the 24 hours ending 09/14/18 1516 Exam:  
 
Physical Exam: 
 
Gen:  Well-developed, well-nourished, in no acute distress HEENT:  Pink conjunctivae, PERRL, hearing intact to voice, dry mucous membranes Neck:  Supple, without masses, thyroid non-tender Resp:  No accessory muscle use but increased resp rate, decreased breath sounds in bases Card:  No murmurs, normal S1, S2 without thrills, bruits or peripheral edema Abd:  Soft, non-tender, non-distended, normoactive bowel sounds are present, no palpable organomegaly and no detectable hernias Lymph:  No cervical or inguinal adenopathy Musc:  No cyanosis or clubbing Skin:  No rashes or ulcers, skin turgor is good Neuro:  Cranial nerves are grossly intact, no focal motor weakness, follows commands appropriately Psych:  Good insight, oriented to person, place and time, alert Labs: 
 
Recent Labs  
   09/14/18 
 1218 WBC  13.7* HGB  15.4 HCT  44.4 PLT  420* Recent Labs  
   09/14/18 
 1345  09/14/18 
 1218 NA   --   132* K  4.6  HEMOLYZED,RECOLLECT REQUESTED  
CL   --   97  
CO2   --   25  
GLU   --   101* BUN   --   17  
CREA   --   0.59 CA   --   9.3 ALB   --   3.1* TBILI   --   0.6 SGOT   --   HEMOLYZED,RECOLLECT REQUESTED  
ALT   --   127* No results found for: [facility] No results for input(s): PH, PCO2, PO2, HCO3, FIO2 in the last 72 hours. No results for input(s): INR in the last 72 hours. No lab exists for component: INREXT All Micro Results Procedure Component Value Units Date/Time [facility], URINE [[id number]] Order Status:  Sent Specimen:  Urine from Urine RESPIRATORY PANEL,PCR,NASOPHARYNGEAL [[id number]] Order Status:  Sent Specimen:  NASOPHARYNGEAL SWAB CULTURE, RESPIRATORY/SPUTUM/BRONCH Valeda Calamity STAIN [[id number]] Order Status:  Sent Specimen:  Sputum from Sputum MYCOPLASMA AB, IGG/IGM [[id number]] Order Status:  Sent Specimen:  Serum S. Stormy, UR/CSF [[id number]] Order Status:  Sent Specimen:  Other CULTURE, BLOOD, PERIPHERAL [[id number]] Collected:  09/14/18 1259 Order Status:  Completed Specimen:  Blood Updated:  09/14/18 1342 CULTURE, BLOOD, PERIPHERAL [[id number]] Order Status:  Canceled Specimen:  Blood ECG reviewed by me. Sinus tachycardia I have reviewed previous records Assessment and Plan:  
  
Principal Problem: 
 
Dyspnea / Cough / Hypoxia. Likely multifactorial due to probable acute pulmonary embolis + probable community acquired pneumonia + pleural effusion. Admit to telemetry. V/Q scan reviewed, will confirm with CTA chest after allergy pre-treatment. LE dopplers. Check echo as well to examine RV. For now, pre-test probability for both high enough to warrant tx for both. Lovenox. IV azithromycin/ceftriaxone. Check viral panel, sputum culture, and pneumonia panel to complete infectious w/u. Will also ask pulmonary to comment. Active Problems: Breast cancer, stage 4 / Bone metastases / Liver metastases / Brain metastasis. Oncology consult. Is to have continued whole brain radiation, next treatment is on 9/17. CODE STATUS: Confirmed patient is DNR. Telemetry reviewed:   Sinus tachycardia Risk of deterioration: high Total time spent with patient: 70 Minutes Care Plan discussed with: Patient, Family, Nursing Staff and >50% of time spent in counseling and coordination of care Discussed:  Code Status, Care Plan and D/C Planning      
___________________________________________________ Attending Physician: Luis Eduardo Cerrato DO

## 2018-09-14 NOTE — ED PROVIDER NOTES
HPI Comments: 62 y.o. female with past medical history significant for metastatic breast cancer, who presents ambulatory with friend from Radiation Oncology office to the ED with cc of shortness of breath. Pt states she is currently undergoing radiation treatment for metastatic breast cancer, which she was initially diagnosed with in 2004. She states she went to her 3rd radiation treatment appointment today and was referred to the ED to rule out PE. She reports SOB x \"the past 2 to 3 months\" that worsened significantly in the past week, yesterday, and today. At present, she reports it is difficult to speak secondary to SOB. She states she had a recent bronchoscopy significant for nodules that were benign on biopsy. She reports associated fatigue, decreased activity, nausea, and decreased appetite. Pt also endorses 4/10 chest discomfort described as a \"heaviness\" sensation x \"1 to 2 weeks\" as well as recent new left-sided neck pain. She reports cough x \"5 to 6 months\" that has been worsening since onset. She notes the cough is exacerbated by deep inspiration. Pt notes she is allergic to contrast dye. She notes her typical heart rate is ~90. She states her last ECG was in July. Pt denies history of thrombus. There are no other acute medical concerns at this time. Social Hx: Never Tobacco use, yes EtOH use (3-4 drinks/month), denies Illicit Drug use PCP: Pura Ellis MD 
 
Note written by Nain Hatch, as dictated by Geary Kussmaul, MD 11:43 AM 
 
 
The history is provided by the patient. No  was used. Past Medical History:  
Diagnosis Date  Breast CA ([facility] [address]  Metastatic cancer ([facility] [address] Past Surgical History:  
Procedure Laterality Date  BREAST SURGERY PROCEDURE UNLISTED Left 2004  
 mastectomy  HX GYN  2010  
 ovaries removed  HX VASCULAR ACCESS Right   
 portacath Family History:  
Problem Relation Age of Onset  Hypertension Mother  Cancer Father   
  mesothelioma  Cancer Maternal Grandfather 76 Bladder Social History Social History  Marital status:  Spouse name: N/A  
 Number of children: N/A  
 Years of education: N/A Occupational History  Not on file. Social History Main Topics  Smoking status: Never Smoker  Smokeless tobacco: Never Used  Alcohol use Yes Comment: 3-4 drinks/month  Drug use: No  
 Sexual activity: No  
 
Other Topics Concern  Not on file Social History Narrative ALLERGIES: Pcn [penicillins] and Contrast agent [iodine] Review of Systems Constitutional: Positive for activity change and appetite change. Respiratory: Positive for cough and shortness of breath. Cardiovascular: Positive for chest pain. Gastrointestinal: Positive for nausea. Musculoskeletal: Positive for neck pain. All other systems reviewed and are negative. There were no vitals filed for this visit. Physical Exam  
Constitutional: She is oriented to person, place, and time. She appears well-developed and well-nourished. Chronically ill-appearing HENT:  
Head: Normocephalic and atraumatic. Eyes: Conjunctivae are normal. No scleral icterus. Neck: Neck supple. No tracheal deviation present. Cardiovascular: Regular rhythm, normal heart sounds and intact distal pulses. Tachycardia present. Exam reveals no gallop and no friction rub. No murmur heard. Pulmonary/Chest: Breath sounds normal. Tachypnea noted. She has no wheezes. She has no rales. Abdominal: Soft. She exhibits no distension. There is no tenderness. There is no rebound and no guarding. Musculoskeletal: She exhibits no edema. Neurological: She is alert and oriented to person, place, and time. Skin: Skin is warm and dry. No rash noted. Psychiatric: She has a normal mood and affect. Nursing note and vitals reviewed. Note written by Nain Washburn, as dictated by Capri Nava MD 11:43 AM 
 
[facility] 
 
 
ED Course Procedures ED EKG interpretation: 
Sinus tachycardia, rate 122, nonspecific ST/T wave abnormalities. Note written by Nain Washburn, as dictated by Capri Nava MD 11:57 AM 
 
CONSULT NOTE: 
2:47 Debbie Jacobson MD spoke with Dr. Amna Mojica, Consult for Hospitalist.  Discussed available diagnostic tests and clinical findings. Dr. Amna Mojica agrees to admit pt. Total critical care time spent exclusive of procedures:  35 min. Capri Nava MD 
2:55 PM 
 
A/P: dyspnea, tachycardia, tachypnea with underlying metastatic breast CA - pneumonia vs PE; CXR shows infiltrate vs ATX; VQ scan (allergic to contrast dye) intermediate prob; Rocephin, Zithromax, Lovenox; admit.   Capri Nava MD 
2:56 PM

## 2018-09-14 NOTE — CONSULTS
Name: Susan Guthrie: [address]  
: 1960 Admit Date: 2018 Phone: [phone]  Room: [place] [address] PCP: Manjit Simms MD  MRN: [id number] Date: 2018  Code: DNR   
   
HPI: 
 
 
Chart and notes reviewed. Data reviewed. I review the patient's current medications in the medical record at each encounter.  I have evaluated and examined the patient. 3:49 PM    
 
History was obtained from patient. I was asked by Gilberto Levy DO to see Herberth Pina in consultation for a chief complaint of dyspnea, shortness of breath . History of Present Illness:   is very pleasant, 62year old lady that presented to [facility] today after radiation for worsening shortness of breath, nausea, and vomiting. She has a history of metastatic breast cancer and is undergoing radiation currently. Tells me that her SOB has been worse over the last week. Has nausea/vomiting associated with that and tells me because her phlegm is so thick, this causes her to vomit. She had a bronchoscopy done per  in July due to abnormal chest CT that demonstrated nodular opacities bilaterally. She has gone through two rounds of antibiotics, one of them being Levaquin and the other she is unsure of, for her symptoms, without improvement. Denies fever or chills. Denies CP, LE pain, or swelling. Daughter notes she has had trouble swallowing. Appetite very poor. No abdominal pain or diarrhea. Images:  Personally reviewed. CXR:   Right greater than left basilar/atlectasis/consolidation VQ Scan:  Abnormal ventilatio and perfusion, indeterminate/intermediate probability for pulmonary embolism. WBC 13.7 Hgb 15.4 Na 132 Creat . 61  Trop negative Pro- Past Medical History:  
Diagnosis Date  Breast CA ([facility] [address]  Metastatic cancer ([facility] [address] Past Surgical History:  
Procedure Laterality Date  BREAST SURGERY PROCEDURE UNLISTED Left  mastectomy  HX GYN  2010  
 ovaries removed  HX VASCULAR ACCESS Right   
 portacath Family History Problem Relation Age of Onset  Hypertension Mother  Cancer Father   
  mesothelioma  Cancer Maternal Grandfather 76 Bladder Social History Substance Use Topics  Smoking status: Never Smoker  Smokeless tobacco: Never Used  Alcohol use Yes Comment: 3-4 drinks/month Allergies Allergen Reactions  Pcn [Penicillins] Anaphylaxis  Contrast Agent [Iodine] Shortness of Breath and Itching Patient reported during phone conversation on 4/18/17. Current Facility-Administered Medications Medication Dose Route Frequency  sodium chloride (NS) flush 5-10 mL  5-10 mL IntraVENous Q8H  
 sodium chloride (NS) flush 5-10 mL  5-10 mL IntraVENous PRN  
 methylPREDNISolone (PF) (SOLU-MEDROL) injection 40 mg  40 mg IntraVENous Q4H  
 azithromycin (ZITHROMAX) 500 mg in 0.9% sodium chloride (MBP/ADV) 250 mL  500 mg IntraVENous ONCE  
 technetium pentetate (Tc-DTPA) injection 36 millicurie  36 millicurie IntraVENous RAD ONCE  
 sodium chloride (NS) flush 5-10 mL  5-10 mL IntraVENous Q8H  
 sodium chloride (NS) flush 5-10 mL  5-10 mL IntraVENous PRN  
 0.9% sodium chloride infusion  100 mL/hr IntraVENous CONTINUOUS  
 [START ON 9/15/2018] enoxaparin (LOVENOX) injection 60 mg  1 mg/kg SubCUTAneous Q12H  
 acetaminophen (TYLENOL) tablet 650 mg  650 mg Oral Q6H PRN  
 oxyCODONE IR (ROXICODONE) tablet 5 mg  5 mg Oral Q4H PRN  prochlorperazine (COMPAZINE) injection 10 mg  10 mg IntraVENous Q6H PRN  
 naloxone (NARCAN) injection 0.4 mg  0.4 mg IntraVENous PRN  
 docusate sodium (COLACE) capsule 100 mg  100 mg Oral BID  [START ON 9/15/2018] cefTRIAXone (ROCEPHIN) 1 g in 0.9% sodium chloride (MBP/ADV) 50 mL  1 g IntraVENous Q24H  
 [START ON 9/15/2018] azithromycin (ZITHROMAX) 500 mg in 0.9% sodium chloride (MBP/ADV) 250 mL  500 mg IntraVENous Q24H  zolpidem (AMBIEN) tablet 5 mg  5 mg Oral QHS  promethazine (PHENERGAN) tablet 25 mg  25 mg Oral Q6H PRN  
 albuterol-ipratropium (DUO-NEB) 2.5 MG-0.5 MG/3 ML  3 mL Nebulization Q4H PRN  
 montelukast (SINGULAIR) tablet 10 mg  10 mg Oral QHS  [START ON 9/15/2018] fluticasone (FLONASE) 50 mcg/actuation nasal spray 2 Spray  2 Spray Both Nostrils DAILY  dexamethasone (DECADRON) tablet 6 mg  6 mg Oral Q12H  diphenhydrAMINE (BENADRYL) injection 25 mg  25 mg IntraVENous ONCE Current Outpatient Prescriptions Medication Sig  prochlorperazine (COMPAZINE) 10 mg tablet Take 0.5 Tabs by mouth every six (6) hours as needed for up to 7 days. Indications: CANCER CHEMOTHERAPY-INDUCED NAUSEA AND VOMITING  
 oxyCODONE (OXYIR) 5 mg capsule Take 1 Cap by mouth every four (4) hours as needed. Max Daily Amount: 30 mg. Indications: Pain  ondansetron (ZOFRAN ODT) 8 mg disintegrating tablet Take 1 Tab by mouth every eight (8) hours as needed for Nausea.  montelukast (SINGULAIR) 10 mg tablet TK 1 T PO  D  
 fluticasone (FLONASE) 50 mcg/actuation nasal spray USE 2 SPRAYS IN EACH NOSTRIL HS  
 dexamethasone (DECADRON) 6 mg tablet 1 po bid  capecitabine (XELODA) 500 mg tablet  TYKERB 250 mg tablet  ALPHA LIPOIC ACID PO Take  by mouth two (2) times a day.  OTHER 1 Cap daily. Vitamin B 17  
 OTHER Green foods complex. 2 tabs daily  OTHER CBD oil, take 40 mg at bedtime  OTHER Indications: Organic Sulfa- Powder mix with liquids  , 4x per day  ascorbic acid, vitamin C, (VITAMIN C) 250 mg tablet Take 250 mg by mouth daily.  MILK THISTLE, BULK, by Does Not Apply route.  IODINE PO Take  by mouth.  BETA 1,3 GLUCAN, BULK, 3 Tabs by Does Not Apply route daily.  OTHER,NON-FORMULARY, Mushroom extract twice daily; Crocifurous extract  TURMERIC, BULK, by Does Not Apply route daily.  cholecalciferol (VITAMIN D3) 1,000 unit tablet Take 4,000 Units by mouth daily.  multivitamin (ONE A DAY) tablet Take 1 tablet by mouth daily.  coenzyme q10 (CO Q-10) 10 mg cap Take  by mouth daily.  LACTOBAC CMB #3/FOS/PANTETHINE (PROBIOTIC & ACIDOPHILUS PO) Take  by mouth daily.  magnesium 250 mg tab Take 500 mg by mouth daily. REVIEW OF SYSTEMS Negative except as stated in the HPI. Physical Exam:  
Visit Vitals  /71  Pulse (!) 116  Temp 97.6 °F (36.4 °C)  Resp 23  
 Ht 5' 6\" (1.676 m)  Wt 61.2 kg (135 lb)  SpO2 93%  BMI 21.79 kg/m2 General:  Alert, cooperative,tachypnic at rest, appears stated age. Head:  Normocephalic, without obvious abnormality, atraumatic. Eyes:  Conjunctivae/corneas clear. PERRL, EOMs intact. Nose: Nares normal. Septum midline. Mucosa normal. No drainage or sinus tenderness. Throat: Lips, mucosa, and tongue normal. Teeth and gums normal.  
Neck: Supple, symmetrical, trachea midline, no adenopathy Lungs:   Clear to auscultation bilaterally. Chest wall:  No tenderness or deformity. Heart:  Regular rate and rhythm, S1, S2 normal, no murmur, click, rub or gallop. Abdomen:   Soft, non-tender. Bowel sounds normal.   
Extremities: Extremities normal, atraumatic, no cyanosis or edema. Pulses: 2+ and symmetric all extremities. Skin: Skin color, texture, turgor normal.   
Lymph nodes: Cervical nodes normal.  
Neurologic: Grossly nonfocal  
 
 
Lab Results Component Value Date/Time Sodium 132 (L) 09/14/2018 12:18 PM  
 Potassium 4.6 09/14/2018 01:45 PM  
 Chloride 97 09/14/2018 12:18 PM  
 CO2 25 09/14/2018 12:18 PM  
 BUN 17 09/14/2018 12:18 PM  
 Creatinine 0.59 09/14/2018 12:18 PM  
 Glucose 101 (H) 09/14/2018 12:18 PM  
 Calcium 9.3 09/14/2018 12:18 PM  
 Magnesium 2.5 (H) 02/16/2018 09:11 PM  
 Phosphorus 3.8 02/16/2018 09:11 PM  
 
 
Lab Results Component Value Date/Time  WBC 13.7 (H) 09/14/2018 12:18 PM  
 HGB 15.4 09/14/2018 12:18 PM  
 PLATELET 860 (H) 72/63/4708 12:18 PM  
 MCV 93.1 09/14/2018 12:18 PM  
 
 
Lab Results Component Value Date/Time AST (SGOT) HEMOLYZED,RECOLLECT REQUESTED 09/14/2018 12:18 PM  
 Alk. phosphatase 163 (H) 09/14/2018 12:18 PM  
 Protein, total 7.4 09/14/2018 12:18 PM  
 Albumin 3.1 (L) 09/14/2018 12:18 PM  
 Globulin 4.3 (H) 09/14/2018 12:18 PM  
 
 
No results found for: IRON, FE, TIBC, IBCT, PSAT, FERR Lab Results Component Value Date/Time TSH 4.78 (H) 02/16/2018 09:11 PM  
  
 
No results found for: PH, PHI, PCO2, PCO2I, PO2, PO2I, HCO3, HCO3I, FIO2, FIO2I Lab Results Component Value Date/Time Troponin-I, Qt. <0.05 09/14/2018 12:18 PM  
  
 
Lab Results Component Value Date/Time Culture result: MODERATE NORMAL RESPIRATORY KAYLA 07/17/2018 01:51 PM  
 Culture result: NO FUNGUS ISOLATED 33 DAYS 07/17/2018 01:51 PM  
 
 
No results found for: TOXA1, RPR, HBCM, HBSAG, HAAB, HCAB1, HAAT, G6PD, CRYAC, HIVGT, HIVR, HIV1, HIV12, HIVPC, HIVRPI No results found for: VANCT, CPK No results found for: COLOR, APPRN, SPGRU, TRI, PROTU, GLUCU, KETU, BILU, BLDU, UROU, ZEENAT, LEUKU, WBCU, RBCU, UEPI, BACTU, CASTS, UCRY IMPRESSION 
· Acute Respiratory Failure with Hypoxia · Abnormal Chest CT:  bronchoscopy on 7/17 negative with appearance of RLL pneumonia on chest CT concerning for aspiration pneumonia · Metastatic Breast Cancer · Tachycardia · Nausea/Vomiting PLAN 
· Supplemental O2 to keep sats >90% · Agree with ABX, add Flagyl to cover for aspiration · Check CTA: she notes that she only has had itching with contrast. Will premedicate prior to CTA. · Check sputum culture · ECHO to assess for pulmonary hypertension · Check BLE dopplers · IVF, check lactic acid · PNA panel ordered · DVT Prophylaxis:  Lovenox Thank you very much for the consult.  
 
 
Gianluca Benton NP

## 2018-09-14 NOTE — ED NOTES
Pt gone to VQ Scan, discussed with Dr. Jesenia Deras, will start antibiotics as soon as pt gets back from scan

## 2018-09-14 NOTE — ED TRIAGE NOTES
Pt states she has been going through treatment for breast cancer and has been having shortness of breath for the past several months, pt states she has had increase in shortness of breath over the past 2 weeks and worst over the past 24 hours. Pt having a hard time speaking in full sentences.

## 2018-09-14 NOTE — IP AVS SNAPSHOT
[address] [place] [address] 104 1007 [facility] 
[phone] Patient: Yovana Wiggins MRN: [id number] :1960 A check sherif indicates which time of day the medication should be taken. My Medications START taking these medications Instructions Each Dose to Equal  
 Morning Noon Evening Bedtime ALPRAZolam 0.5 mg tablet Commonly known as:  Jenphoenix Lionk Your last dose was:   Take 1 Tab by mouth four (4) times daily as needed for Anxiety for up to 30 days. Max Daily Amount: 2 mg. 0.5 mg  
    
   
   
   
  
 budesonide 0.5 mg/2 mL Nbsp Commonly known as:  PULMICORT Your last dose was:  8 am   
   
 2 mL by Nebulization route two (2) times a day for 30 days. 500 mcg  
    
  
   
   
   
  
  
 cloNIDine HCl 0.1 mg tablet Commonly known as:  CATAPRES Your last dose was:  9 am  Take 1 Tab by mouth two (2) times a day for 30 days. 0.1 mg  
    
  
   
   
  
   
  
 guaiFENesin  mg ER tablet Commonly known as:  Prakash & Prakash Your last dose was:  9 am  Take 1 Tab by mouth every twelve (12) hours for 10 days. 600 mg  
    
  
   
   
   
  
  
 pantoprazole 40 mg tablet Commonly known as:  PROTONIX Start taking on:  2018 Your last dose was:  9 am  Take 1 Tab by mouth Daily (before breakfast) for 30 days. 40 mg CHANGE how you take these medications Instructions Each Dose to Equal  
 Morning Noon Evening Bedtime  
 montelukast 10 mg tablet Commonly known as:  JR What changed:  Another medication with the same name was removed. Continue taking this medication, and follow the directions you see here. Your last dose was:  9 am  Take 10 mg by mouth daily. 10 mg  
    
  
   
   
   
  
 oxyCODONE IR 5 mg immediate release tablet Commonly known as:  Ki Menon  
 What changed:  Another medication with the same name was removed. Continue taking this medication, and follow the directions you see here. Your last dose was:  Not given while in the hospital - resume as needed at discharge Take 5 mg by mouth daily as needed for Pain. 5 mg CONTINUE taking these medications Instructions Each Dose to Equal  
 Morning Noon Evening Bedtime  
 acetaminophen 500 mg tablet Commonly known as:  TYLENOL Your last dose was:  9/15 Take 500 mg by mouth every six (6) hours as needed for Pain. 500 mg  
    
   
   
   
  
 dexamethasone 2 mg tablet Commonly known as:  DECADRON Your last dose was:  9 am 9/17 Take 4 mg by mouth two (2) times daily (with meals). 4 mg FIORICET -40 mg per capsule Generic drug:  butalbital-acetaminophen-caff Your last dose was:  Not given while in the hospital - resume as needed at discharge Take 1 Cap by mouth two (2) times daily as needed for Headache. 1 Cap  
    
   
   
   
  
 ibuprofen 200 mg tablet Commonly known as:  MOTRIN Your last dose was:  Not given while in the hospital - resume as needed at discharge Take 400 mg by mouth every eight (8) hours as needed for Pain. 400 mg PROBIOTIC & ACIDOPHILUS PO Your last dose was:  9 am 9/17 Take 1 Cap by mouth daily. 1 Cap  
    
  
   
   
   
  
 promethazine 25 mg tablet Commonly known as:  PHENERGAN Your last dose was:  9/16 Take 25 mg by mouth every six (6) hours as needed for Nausea. 25 mg  
    
   
   
   
  
 VITAMIN C 250 mg tablet Generic drug:  ascorbic acid (vitamin C) Your last dose was:  Not given while in the hospital - resume at discharge Take 250 mg by mouth daily. 250 mg Where to Get Your Medications Information on where to get these meds will be given to you by the nurse or doctor. ! Ask your nurse or doctor about these medications ALPRAZolam 0.5 mg tablet  
 budesonide 0.5 mg/2 mL Nbsp  
 cloNIDine HCl 0.1 mg tablet  
 guaiFENesin  mg ER tablet  
 pantoprazole 40 mg tablet

## 2018-09-14 NOTE — ED NOTES
Single blood culture sent to lab, per dr. Radha Briggs one blood culture is ok at this time prior to antibiotic.

## 2018-09-14 NOTE — PROGRESS NOTES
BSI: MED RECONCILIATION Comments/Recommendations: Discussed current PTA medication list with patient. Reviewed drug allergies and recent changes to PTA medications. The patient was questioned regarding recent use of other prescription and nonprescription medications not listed on their current medication list. 
? Patient was taking multiple vitamin supplements in the past which have been removed from the PTA medication list. She was instructed by her doctor to stop taking these and the only supplements that she currently takes are the ascorbic acid and the probiotic ? Patient was previously taking oral chemotherapy as listed below. This is currently discontinued and patient reports that she has not taken oral chemotherapy in ~6 weeks: 
      - Capecitabine 500 mg tablet (last filled 6/15/18 for #336 tablets- this was prescribed for an 84 day supply) - Tykerb (lapatinib) 250 mg tablet (last filled 7/13/18 for #420 tablets- this was prescribed for an 84 day supply) ? Patient has been taking both ibuprofen and acetaminophen as needed for pain- She has oxycodone 5 mg tablets at home but she uses this sparingly and prefers not to take it if she can avoid it. ? Patient reports she is allergic to penicillins, and oral contrast dye 
? Her preferred pharmacy is the [facility] Medications added: · Ibuprofen 400 mg as needed for pain · Acetaminophen 500 mg as needed for pain · Fioricet capsule- twice daily as needed for headache · Promethazine 25 mg tablet every 6 hours as needed for nausea- Patient previously prescribed prochlorperazine and ondansetron but states that she is only taking the promethazine · Montelukast 10 mg daily Medications removed: · Multiple OTC dietary supplements- Alpha lipoic acid, cholecalciferol, beta glucan, coQ10, iodine PO, magnesium, milk thistle, CBD oil, green foods complex, vitamin B, Turmeric, mushroom extract · Multivitamin daily · Ondansetron ODT 8 mg tablet · Prochlorperazine 10 mg tablet · Flonase nasal spray Medications adjusted: · Dexamethasone adjusted to 4 mg twice daily- patient has the 2 mg tablets at home and takes 2 of these BID · Oxycodone adjusted to daily as needed Information obtained from: Patient, [address], outpatient MD notes Allergies: Pcn [penicillins] and Contrast agent [iodine] Prior to Admission Medications:  
 
Medication Documentation Review Audit Reviewed by Sarmad Mcrae, MIKEYD (Pharmacist) on 09/14/18 at [address] [place] [address] Medication Sig Documenting Provider Last Dose Status Taking?  
 
 acetaminophen (TYLENOL) 500 mg tablet Take 500 mg by mouth every six (6) hours as needed for Pain. Historical Provider 9/7/2018 Unknown time Active Yes  
 ascorbic acid, vitamin C, (VITAMIN C) 250 mg tablet Take 250 mg by mouth daily. Historical Provider 9/14/2018 AM Active Yes  
 butalbital-acetaminophen-caff (FIORICET) -40 mg per capsule Take 1 Cap by mouth two (2) times daily as needed for Headache. Historical Provider 9/13/2018 PM Active Yes  
 dexamethasone (DECADRON) 2 mg tablet Take 4 mg by mouth two (2) times daily (with meals). Historical Provider 9/14/2018 AM Active Yes  
 ibuprofen (MOTRIN) 200 mg tablet Take 400 mg by mouth every eight (8) hours as needed for Pain. Historical Provider 9/14/2018 AM Active Yes LACTOBAC CMB #3/FOS/PANTETHINE (PROBIOTIC & ACIDOPHILUS PO) Take 1 Cap by mouth daily. Historical Provider 9/14/2018 AM Active Yes  
 montelukast (SINGULAIR) 10 mg tablet Take 10 mg by mouth daily. Historical Provider 9/14/2018 AM Active Yes  
 oxyCODONE IR (ROXICODONE) 5 mg immediate release tablet Take 5 mg by mouth daily as needed for Pain. Historical Provider 9/12/2018 PM Active Yes  
 promethazine (PHENERGAN) 25 mg tablet Take 25 mg by mouth every six (6) hours as needed for Nausea. Historical Provider 9/14/2018 AM Active Yes Humberto Perez, PHARMD   Contact: [phone]

## 2018-09-15 PROBLEM — Z51.89 ALTERNATIVE MEDICINE: Status: RESOLVED | Noted: 2017-05-29 | Resolved: 2018-01-01

## 2018-09-15 PROBLEM — R06.82 TACHYPNEA: Status: ACTIVE | Noted: 2018-01-01

## 2018-09-15 PROBLEM — F41.9 ANXIETY: Status: ACTIVE | Noted: 2017-05-29

## 2018-09-15 PROBLEM — R06.02 SOB (SHORTNESS OF BREATH): Status: RESOLVED | Noted: 2018-01-01 | Resolved: 2018-01-01

## 2018-09-15 PROBLEM — R51.9 NONINTRACTABLE EPISODIC HEADACHE: Status: RESOLVED | Noted: 2018-01-01 | Resolved: 2018-01-01

## 2018-09-15 PROBLEM — R00.0 SINUS TACHYCARDIA: Status: ACTIVE | Noted: 2018-01-01

## 2018-09-15 PROBLEM — I26.99 ACUTE PULMONARY EMBOLISM (HCC): Status: RESOLVED | Noted: 2018-01-01 | Resolved: 2018-01-01

## 2018-09-15 PROBLEM — R11.0 NAUSEA: Status: RESOLVED | Noted: 2018-01-01 | Resolved: 2018-01-01

## 2018-09-15 PROBLEM — Z09 CHEMOTHERAPY FOLLOW-UP EXAMINATION: Status: RESOLVED | Noted: 2017-07-23 | Resolved: 2018-01-01

## 2018-09-15 NOTE — PROGRESS NOTES
2051 
Bedside and Verbal shift change report given to [address] [place] [address] (oncoming nurse) by Hamida Munoz RN (offgoing nurse). Report included the following information SBAR, Kardex, Procedure Summary, Intake/Output, MAR, Accordion and Recent Results. Patient on bed with family on the bedside. No pain but said just feeling tired. Initial assessment done. Visit Vitals  /78  Pulse (!) 116  Temp 97.7 °F (36.5 °C)  Resp 27  
 Ht 5' 6\" (1.676 m)  Wt 61.3 kg (135 lb 3.2 oz)  SpO2 95%  BMI 21.82 kg/m2 [facility] Patient still feeling shortness of breath despite O2 saturation of 95%. Family still on the bedside. 0700 Bedside and Verbal shift change report given to [facility] (oncoming nurse) by Tom Amaro RN (offgoing nurse). Report included the following information SBAR, Kardex, Procedure Summary, Intake/Output, MAR, Accordion and Recent Results.

## 2018-09-15 NOTE — PROGRESS NOTES
Problem: Falls - Risk of 
Goal: *Absence of Falls Document Araceli Syed Fall Risk and appropriate interventions in the flowsheet. Outcome: Progressing Towards Goal 
Fall Risk Interventions: 
  
 
  
 
Medication Interventions: Assess postural VS orthostatic hypotension, Bed/chair exit alarm, Patient to call before getting OOB, Teach patient to arise slowly

## 2018-09-15 NOTE — PROGRESS NOTES
Problem: Falls - Risk of 
Goal: *Absence of Falls Document Jaswant Hammond Fall Risk and appropriate interventions in the flowsheet. Outcome: Not Progressing Towards Goal 
Fall Risk Interventions: 
  Absence of fall . fall precaution in place: ( patient's  mentation very impulsive,  unsteady on feet and accelerating when up by self) Educating pt to call when up and arise slowly from bed.

## 2018-09-15 NOTE — PROGRESS NOTES
Reason for Admission:   Dyspnea---- Has a dx of metastatic cx RRAT Score:     18 Do you (patient/family) have any concerns for transition/discharge? Pt has a supportive family and friends Plan for utilizing home health:     Has not used in the past but would be open to it if needed. Likelihood of readmission? Mod but due to dx would say more like high-RED Transition of Care Plan:      Pt reside with spouse and has a good support network. Is going through radiation for dx of cx. PT ordered RW. Delivered to pt's room. Will continue to follow for needs. Yarelis Garza Care Management Interventions PCP Verified by CM: Yes Mode of Transport at Discharge:  (Family will provide) Discharge Durable Medical Equipment: Yes Current Support Network: Lives with Spouse Discharge Location Discharge Placement: Home with family assistance

## 2018-09-15 NOTE — PROGRESS NOTES
Pulm f/u Received call this pm re pt and when we would be rounding. Pt was inadvertently not signed out by team to be seen. I had already rounded at [facility] this morning and am [facility]. Chart reviewed online Discussed with Dr Alaina Sampson hospitalist. 
Pt being treated for possible aspiration event. He has bumped steroids She has an element hyperventilation CT concerning for probable pulmonary metastatic disease. Recent bronch unrevealing. No evidence for PE or DVT 
ECHO unrevealing Does have modest bilat pl effusions -? some element pulm edema. Could consider tx tap I will plan to see on rounds tomorrow a.m. and will d/w further with pt and family my impressions. Available tonight as needed

## 2018-09-15 NOTE — PROGRESS NOTES
Spiritual Care Assessment/Progress Note [address] 
 
 
NAME: Rae Mendosa      MRN: [id number] AGE: 62 y.o. SEX: female Catholic Affiliation: Kami Language: [place] 9/15/2018     Total Time (in minutes): 7 Spiritual Assessment begun in [facility] 3 PRO CARE TELE 2 through conversation with: 
  
    [x]Patient        [x] Family    [] Friend(s) Reason for Consult: Palliative Care, Initial/Spiritual Assessment Spiritual beliefs: (Please include comment if needed) [x] Identifies with a tyler tradition:     
   [x] Supported by a tyler community:        
   [] Claims no spiritual orientation:       
   [] Seeking spiritual identity:            
   [] Adheres to an individual form of spirituality:       
   [] Not able to assess:                   
 
    
Identified resources for coping:  
   [x] Prayer                           
   [] Music                  [] Guided Imagery [x] Family/friends                 [] Pet visits [] Devotional reading                         [] Unknown 
   [] Other:                                          
 
 
Interventions offered during this visit: (See comments for more details) Patient Interventions: Affirmation of tyler, Affirmation of emotions/emotional suffering, Catharsis/review of pertinent events in supportive environment, Coping skills reviewed/reinforced, Iconic (affirming the presence of God/Higher Power), Initial/Spiritual assessment, patient floor, Normalization of emotional/spiritual concerns, Prayer (actual), Prayer (assurance of), Catholic beliefs/image of God discussed Family/Friend(s): Affirmation of emotions/emotional suffering, Catharsis/review of pertinent events in supportive environment, Normalization of emotional/spiritual concerns Plan of Care: 
 
 [x] Support spiritual and/or cultural needs  
 [] Support AMD and/or advance care planning process    
 [] Support grieving process [] Coordinate Rites and/or Rituals  
 [] Coordination with community clergy [] No spiritual needs identified at this time 
 [] Detailed Plan of Care below (See Comments)  [] Make referral to Music Therapy 
[] Make referral to Pet Therapy    
[] Make referral to Addiction services 
[] Make referral to [facility] 
[] Make referral to Spiritual Care Partner 
[] No future visits requested       
[] Follow up visits as needed Comments:  is visiting for an initial spiritual assessment with pt in room 333. Pt and her sister in law were both present at the time. Pt expressed not having rested or slept well in a while. She notes she is well supported by her family. Per sister, she is very close to her children and many of her worries are about her kids. She states she is Merrill Orantes, though not part of a specific Protestant. Pt has a close [place] of female, Merrill Orantes friends from whom she has support.  provided a listening presence, conversation, and prayer. Chaplain Cary Fletcher M.Div, M.S, [facility] Spiritual Care available at [address] [place] [phone])

## 2018-09-15 NOTE — PROGRESS NOTES
Problem: Mobility Impaired (Adult and Pediatric) Goal: *Acute Goals and Plan of Care (Insert Text) Physical Therapy Goals Initiated 9/15/2018 1. Patient will move from supine to sit and sit to supine  in bed with modified independence within 7 day(s). 2.  Patient will transfer from bed to chair and chair to bed with modified independence using the least restrictive device within 7 day(s). 3.  Patient will perform sit to stand with modified independence within 7 day(s). 4.  Patient will ambulate with supervision/set-up for 150 feet with the least restrictive device within 7 day(s). 5.  Patient will ascend/descend 5 stairs with 1 handrail(s) with minimal assistance/contact guard assist within 7 day(s). physical Therapy EVALUATION Patient: Marlon Bernal (58 y.o. female) Date: 9/15/2018 Primary Diagnosis: Dyspnea Precautions:   Fall (last chemo 5wks ago); Impulsive ASSESSMENT : 
Based on the objective data described below, the patient presents with admission due to dyspnea. Pt with significant hx of metastatic CA including breast, brain, bone and liver. Pt stating that she lives with her  and children, yet all work. No record of falls to date per pt and states has been independent PTA. Pt received supine in bed stating very fatigued due to lack of sleep. Confirming improved breathing at this time. Noted L eye ptosis/closed and slightly slurred speech. Pt stating nerve damage from radiation. Supine to sit with impulsivity noted. Sit to stand with CGA with same. Pt scoring 29/56 on Saunders Balance Test making her a Moderate fall risk. Demonstrates LLE weakness and obvious loss of vision with eye closed on L. Noted path deviations with gait of 25' in room and in need of RW for safety. Order placed. Returned to bed with alarm set and RN informed of pt fall risk. PT indicated to improve functional mobility, balance, safety awareness, strength and activity tolerance . Patient will benefit from skilled intervention to address the above impairments. Patients rehabilitation potential is considered to be Fair Factors which may influence rehabilitation potential include:  
[]         None noted 
[]         Mental ability/status [x]         Medical condition 
[]         Home/family situation and support systems 
[]         Safety awareness 
[]         Pain tolerance/management 
[]         Other: PLAN : 
Recommendations and Planned Interventions: 
[x]           Bed Mobility Training             [x]    Neuromuscular Re-Education 
[x]           Transfer Training                   []    Orthotic/Prosthetic Training 
[x]           Gait Training                         []    Modalities [x]           Therapeutic Exercises           []    Edema Management/Control 
[x]           Therapeutic Activities            [x]    Patient and Family Training/Education 
[]           Other (comment): Frequency/Duration: Patient will be followed by physical therapy  5 times a week to address goals. Discharge Recommendations: Home Health and 24hr supervision Further Equipment Recommendations for Discharge: rolling walker ordered SUBJECTIVE:  
Patient stated I need to sleep.  OBJECTIVE DATA SUMMARY:  
HISTORY:   
Past Medical History:  
Diagnosis Date  Breast CA ([facility] [address]  Metastatic cancer ([facility] [address] Past Surgical History:  
Procedure Laterality Date  BREAST SURGERY PROCEDURE UNLISTED Left 2004  
 mastectomy  HX GYN  2010  
 ovaries removed  HX VASCULAR ACCESS Right   
 portacath Prior Level of Function/Home Situation: see above Personal factors and/or comorbidities impacting plan of care: see above Home Situation Home Environment: Private residence # Steps to Enter: 5 Rails to Enter: Yes Hand Rails : Bilateral 
One/Two Story Residence: Two story # of Interior Steps: 15 Interior Rails: Right Living Alone: No 
 Support Systems: Spouse/Significant Other/Partner, Child(thomas) Patient Expects to be Discharged to[de-identified] Private residence Current DME Used/Available at Home: None Tub or Shower Type: Tub/Shower combination EXAMINATION/PRESENTATION/DECISION MAKING:  
Critical Behavior: 
Neurologic State: Alert Orientation Level: Oriented X4 Cognition: Follows commands Safety/Judgement: Awareness of environment, Fall prevention Hearing: 
  
Skin:  IV 
Edema: WNL Range Of Motion: 
AROM: Generally decreased, functional 
  
  
  
PROM: Generally decreased, functional 
  
  
  
Strength:   
Strength:  (c/o LLE weakness) Tone & Sensation:  
Tone: Normal 
  
  
  
  
Sensation: Intact Coordination: 
Coordination: Generally decreased, functional 
Vision:  
  
Functional Mobility: 
Bed Mobility: 
  
  
  
  
Transfers: 
  
  
     
  
     
  
  
Balance:  
  
Ambulation/Gait Training: 
Distance (ft): 20 Feet (ft) Assistive Device: Walker, rolling;Gait belt Ambulation - Level of Assistance: Moderate assistance;Minimal assistance Gait Abnormalities: Ataxic;Path deviations Base of Support: Narrowed; Center of gravity altered Speed/Yael: Fluctuations Step Length: Right shortened;Left shortened Functional Measure: 
Rosario Cake Balance Test: 
 
Sitting to Standing: 3 Standing Unsupported: 2 Sitting with Back Unsupported: 4 Standing to Sittin Transfers: 1 Standing Unsupported with Eyes Closed: 2 Standing Unsupported with Feet Together: 1 Reach Forward with Outstretched Arm: 3  Object: 3 Turn to Look Over Shoulders: 3 Turn 360 Degrees: 1 Alternate Foot on Step/Stool: 1 Standing Unsupported One Foot in Front: 1 Stand on One Le Total: 29 
  
 
 
56=Maximum possible score;  
0-20=High fall risk 21-40=Moderate fall risk 41-56=Low fall risk Saunders Balance Test and G-code impairment scale: 
Percentage of Impairment CH 
 
0% 
 CI 
 
1-19% CJ 
 
 20-39% CK 
 
40-59% CL 
 
60-79% CM 
 
80-99% CN  
 
100% Larnell Homans Score 0-56 56 45-55 34-44 23-33 12-22 1-11 0  
 
 
 
G codes: In compliance with CMSs Claims Based Outcome Reporting, the following G-code set was chosen for this patient based on their primary functional limitation being treated: The outcome measure chosen to determine the severity of the functional limitation was the Jose Teens with a score of 29/56 which was correlated with the impairment scale. ? Mobility - Walking and Moving Around:  
  - CURRENT STATUS: CK - 40%-59% impaired, limited or restricted  - GOAL STATUS: CJ - 20%-39% impaired, limited or restricted  - D/C STATUS:  ---------------To be determined--------------- Physical Therapy Evaluation Charge Determination History Examination Presentation Decision-Making HIGH Complexity :3+ comorbidities / personal factors will impact the outcome/ POC  MEDIUM Complexity : 3 Standardized tests and measures addressing body structure, function, activity limitation and / or participation in recreation  MEDIUM Complexity : Evolving with changing characteristics  Other outcome measures Saunders  MEDIUM Based on the above components, the patient evaluation is determined to be of the following complexity level: MEDIUM Pain: 
Pain Scale 1: Numeric (0 - 10) Pain Intensity 1: 0 Activity Tolerance:  
fair Please refer to the flowsheet for vital signs taken during this treatment. After treatment:  
[]         Patient left in no apparent distress sitting up in chair 
[x]         Patient left in no apparent distress in bed 
[x]         Call bell left within reach [x]         Nursing notified 
[]         Caregiver present [x]          alarm activated COMMUNICATION/EDUCATION:  
The patients plan of care was discussed with: Registered Nurse. [x]         Fall prevention education was provided and the patient/caregiver indicated understanding. [x]         Patient/family have participated as able in goal setting and plan of care. [x]         Patient/family agree to work toward stated goals and plan of care. []         Patient understands intent and goals of therapy, but is neutral about his/her participation. []         Patient is unable to participate in goal setting and plan of care. Thank you for this referral. 
Mirian Hebert, PT Time Calculation: 29 mins

## 2018-09-15 NOTE — PROGRESS NOTES
Asked to see pt at bedside due to concerns for worsening dyspnea. Chart reviewed, case discussed with Dr. Daryle Robinson. Pt seen at bedside. Dyspneic, uncomfortable. Better sitting up. No fevers. Coughing, clear sputum. /70 (BP 1 Location: Right arm, BP Patient Position: At rest)  Pulse (!) 130  Temp 97.8 °F (36.6 °C)  Resp (!) 40  Ht 5' 6\" (1.676 m)  Wt 61.3 kg (135 lb 3.2 oz)  SpO2 97%  BMI 21.82 kg/m2 GEN: mild distress RESP: RR mid 30s, increased WOB. Mildly diminished in bases but moving good air CV: tachycardic, reg rhythm, no MRG. No LE edema. No JVD Acute hypoxic resp failure: noted CTA chest findings, no PE, worsening reticulonodular findings. Agree with continuing IV abx including coverage for aspiration. Noted pleural effusion. Echo preserved LVEF, moderate pulmonary HTN. Concern for findings of progression of reticular nodular lung disease. Question if this could be from metastatic disease? Pleural effusion does not seem significant enough to cause degree of dyspnea. Would benefit from diagnostic thoracentesis at least. Repeat CXR, get ABG now to assess severity of hypoxia. Does not need therapeutic Lovenox, decrease dose. Pulmonology following. Discussed with Dr. Daryle Robinson. ADDENDUM: 
ABG looks good, CXR showed findings \"suggestive of pulmonary edema with right pleural effusion. Superimposed pneumonia cannot be excluded\". Stopped IVF. Discussed with nursing. Hesitant to use diuretics right now due to borderline BPs with tachycardia. Attending Dr. Daryle Robinson updated on the the above. He will re-assume care. Chichi Naik MD 
 
~30 minutes CC provided Critical Care:  Time spent ~ 30 minutes.  The reason for providing this level of medical care for this critically ill patient was due to a critical illness (acute respiratory failure) that impaired one or more vital organ systems such that there was a high probability of imminent or life threatening deterioration in the patients condition. This care involved high complexity decision making to assess, manipulate, and support vital system functions.

## 2018-09-15 NOTE — PROGRESS NOTES
[facility] Medical Progress Note NAME: Dariana Vasquez :  1960 MRM:  [id number] Date/Time: 9/15/2018  9:24 AM 
 
  
Assessment and Plan: Dyspnea / Tachypnea / Sinus tachycardia - POA, severe, unclear etiology. CTA ruled out PE. Lack of fever, sputum, hypoxia argue against PNA. Anxiety and effusions may contribute. Oxygen and morphine for comfort. Palliative care consult. Breast cancer, stage 4 / Bone metastases / Liver metastases / Brain metastasis - Nearing end stage. No chemo due to poor functional status. Brain irradiation for comfort. Consult oncology. Pneumonia - Possible, per pulmonary consult. Cx and serology negative so far. May be aspiration. Empiric azithro, ceftriaxone and flagyl as we do further workup. Sepsis / Leukocytosis / Tachycardia / Tachypnea - POA, but unclear if infectious origin. WBC normal after 24hr. Never fever. Pulse and respirations may have causes as above. Anxiety - Prn xanan Fatigue / dysphagia - Fall and aspiration precautions. PT/OT/speech eval. 
 
 
  
Subjective: Chief Complaint:  Still dyspnea, unchanged ROS: 
(bold if positive, if negative) SOB/SHINE Tolerating some PT  Tolerating Diet Objective:  
 
Last 24hrs VS reviewed since prior progress note. Most recent are: 
 
Visit Vitals  /77  Pulse (!) 110  Temp 97.7 °F (36.5 °C)  Resp (!) 32  
 Ht 5' 6\" (1.676 m)  Wt 61.3 kg (135 lb 3.2 oz)  SpO2 96%  BMI 21.82 kg/m2 SpO2 Readings from Last 6 Encounters:  
09/15/18 96% 08/15/18 98% 18 97% 18 97% 07/10/18 97% 18 98% O2 Flow Rate (L/min): 2 l/min Intake/Output Summary (Last 24 hours) at 09/15/18 9038 Last data filed at 09/15/18 1835 Gross per 24 hour Intake          1653.33 ml Output             1850 ml Net          -196.67 ml Physical Exam: 
 
Gen:  Frail, in mild acute distress HEENT:  Pink conjunctivae, PERRL, hearing intact to voice, moist mucous membranes Neck:  Supple, without masses, thyroid non-tender Resp:  Mild accessory muscle use, bilateral breath sounds without wheezes rales or rhonchi 
Card:  No murmurs, tachycardic S1, S2 without thrills, bruits or peripheral edema Abd:  Soft, non-tender, non-distended, normoactive bowel sounds are present, no mass Lymph:  No cervical or inguinal adenopathy Musc:  No cyanosis or clubbing Skin:  No rashes or ulcers, skin turgor is reduced Neuro:  Cranial nerves are grossly intact, general motor weakness, follows commands Psych:  Good insight, oriented to person, place and time, alert Telemetry reviewed:   normal sinus rhythm 
__________________________________________________________________ Medications Reviewed: (see below) Medications:  
 
Current Facility-Administered Medications Medication Dose Route Frequency  LORazepam (ATIVAN) injection 1 mg  1 mg IntraVENous Q4H PRN  
 morphine IR (MS IR) tablet 15 mg  15 mg Oral Q4H PRN  
 sodium chloride (NS) flush 5-10 mL  5-10 mL IntraVENous Q8H  
 sodium chloride (NS) flush 5-10 mL  5-10 mL IntraVENous PRN  
 sodium chloride (NS) flush 5-10 mL  5-10 mL IntraVENous Q8H  
 sodium chloride (NS) flush 5-10 mL  5-10 mL IntraVENous PRN  
 0.9% sodium chloride infusion  100 mL/hr IntraVENous CONTINUOUS  
 enoxaparin (LOVENOX) injection 60 mg  1 mg/kg SubCUTAneous Q12H  
 acetaminophen (TYLENOL) tablet 650 mg  650 mg Oral Q6H PRN  
 oxyCODONE IR (ROXICODONE) tablet 5 mg  5 mg Oral Q4H PRN  prochlorperazine (COMPAZINE) injection 10 mg  10 mg IntraVENous Q6H PRN  
 naloxone (NARCAN) injection 0.4 mg  0.4 mg IntraVENous PRN  
 docusate sodium (COLACE) capsule 100 mg  100 mg Oral BID  cefTRIAXone (ROCEPHIN) 1 g in 0.9% sodium chloride (MBP/ADV) 50 mL  1 g IntraVENous Q24H  
 azithromycin (ZITHROMAX) 500 mg in 0.9% sodium chloride (MBP/ADV) 250 mL  500 mg IntraVENous Q24H  zolpidem (AMBIEN) tablet 5 mg  5 mg Oral QHS  promethazine (PHENERGAN) tablet 25 mg  25 mg Oral Q6H PRN  
 albuterol-ipratropium (DUO-NEB) 2.5 MG-0.5 MG/3 ML  3 mL Nebulization Q4H PRN  
 montelukast (SINGULAIR) tablet 10 mg  10 mg Oral DAILY  metroNIDAZOLE (FLAGYL) IVPB premix 500 mg  500 mg IntraVENous Q12H  
 dexamethasone (DECADRON) tablet 4 mg  4 mg Oral Q12H Lab Data Reviewed: (see below) Lab Review:  
 
Recent Labs  
   09/15/18 
 0224  09/14/18 
 1218 WBC  9.6  13.7* HGB  13.5  15.4 HCT  39.5  44.4 PLT  406*  420* Recent Labs  
   09/15/18 
 0224  09/14/18 
 1345  09/14/18 
 1218 NA  138   --   132* K  4.2  4.6  HEMOLYZED,RECOLLECT REQUESTED  
CL  103   --   97  
CO2  24   --   25 GLU  107*   --   101* BUN  11   --   17  
CREA  0.59   --   0.59 CA  9.1   --   9.3 MG  2.2   --    --   
PHOS  4.2   --    --   
ALB   --    --   3.1* TBILI   --    --   0.6 SGOT   --    --   HEMOLYZED,RECOLLECT REQUESTED  
ALT   --    --   127* No results found for: Fish Pore No results for input(s): PH, PCO2, PO2, HCO3, FIO2 in the last 72 hours. No results for input(s): INR in the last 72 hours. No lab exists for component: INREXT All Micro Results Procedure Component Value Units Date/Time CULTURE, BLOOD, PERIPHERAL [[id number]] Collected:  09/14/18 1259 Order Status:  Completed Specimen:  Blood Updated:  09/15/18 0710 Special Requests: NO SPECIAL REQUESTS Culture result: NO GROWTH AFTER 17 HOURS     
 RESPIRATORY PANEL,PCR,NASOPHARYNGEAL [[id number]] Collected:  09/14/18 1841 Order Status:  Completed Specimen:  Nasopharyngeal Updated:  09/14/18 2229 Adenovirus NOT DETECTED Coronavirus 229E NOT DETECTED Coronavirus HKU1 NOT DETECTED Coronavirus CVNL63 NOT DETECTED Coronavirus OC43 NOT DETECTED Metapneumovirus NOT DETECTED Rhinovirus and Enterovirus NOT DETECTED   Influenza A NOT DETECTED     
 Influenza A, subtype H1 NOT DETECTED Influenza A, subtype H3 NOT DETECTED INFLUENZA A H1N1 PCR NOT DETECTED Influenza B NOT DETECTED Parainfluenza 1 NOT DETECTED Parainfluenza 2 NOT DETECTED Parainfluenza 3 NOT DETECTED Parainfluenza virus 4 NOT DETECTED     
  RSV by PCR NOT DETECTED Bordetella pertussis - PCR NOT DETECTED Chlamydophila pneumoniae DNA, QL, PCR NOT DETECTED Mycoplasma pneumoniae DNA, QL, PCR NOT DETECTED MYCOPLASMA AB, IGG/IGM [[id number]] Collected:  09/14/18 1841 Order Status:  Completed Specimen:  Serum Updated:  09/14/18 1920 [facility], URINE [[id number]] Order Status:  Sent Specimen:  Urine from Urine CULTURE, RESPIRATORY/SPUTUM/BRONCH Taft Passey STAIN [[id number]] Order Status:  Sent Specimen:  Sputum from Sputum LEE Burrows, UR/CSF [[id number]] Order Status:  Sent Specimen:  Other CULTURE, BLOOD, PERIPHERAL [[id number]] Order Status:  Canceled Specimen:  Blood I have reviewed notes of prior 24hr. Other pertinent lab: none Total time spent with patient: 45 Minutes Care Plan discussed with: Patient, Family, Nursing Staff, Consultant/Specialist and >50% of time spent in counseling and coordination of care Discussed:  Care Plan Prophylaxis:  H2B/PPI Disposition:  Home w/Family 
        
___________________________________________________ Attending Physician: Elesa Peabody, MD

## 2018-09-15 NOTE — PROGRESS NOTES
Change of Shift Report: 
 
1930:  Bedside and Verbal shift change report given to [address] [place] [address], RN (oncoming nurse) by Lisa Poon RN (offgoing nurse). Report included the following information SBAR, Kardex, ED Summary, MAR, Accordion, Recent Results and Cardiac Rhythm Sinus Tach. Shift Summary: 
 
2010:  Vitals taken and patient assessed. Change of Shift Report: 
 
2050:  Bedside and Verbal shift change report given to Greg Prado RN (oncoming nurse) by [address] [place] [address], RN (offgoing nurse). Report included the following information SBAR, Kardex, MAR, Accordion, Recent Results and Cardiac Rhythm Sinus Tach.

## 2018-09-15 NOTE — PROGRESS NOTES
Notified Dr. Theodora Ledesma for the respiratory distress episode,( increased work of breathing vital signs show elevated RR to 40's and HR steady in 130\"s,) who refers to Dr Lyudmila Stevenson who will be responding to the consult. Patient and family also requested to see pulmonary group who had seen patient yesterday  (by Genaro Liu) , consult book saids Dr. Bree Mendes was on call for pulmonary. He called back and denied  having any consult for the patient.

## 2018-09-16 NOTE — PROGRESS NOTES
[facility] [place] [facility] [place] [address] [address] [facility], [facility] [address] [facility] W: [phone]  F: [phone] Reason for Visit:  
Dariana Vasquez is a 62 y.o. female who is seen in consultation at the request of Dr. Margaret Dutton for evaluation of metastattic breast cancer with brain mets; recs for anticoagulation and radiation. Hematology / Oncology Treatment History:  
Diagnosis: Metastatic breast cancer to liver and bone, now brain 
  
TREATMENT COURSE: Cedric Ames started 6/17 and stopped 10/17.   
4/2004, s/p L mastectomy with Dr. Natali Atlman at [facility]. Stage IIB, T2N1M0, 3/39 LN +, ER negative, NC +, HER 2 +, was enrolled on NSABP B-31 trial with adriamycin/cytoxan q 3 weeks x 4 followed by taxol weekly with herceptin x 12 then completion of a year of herceptin. States she did not receive XRT. Did receive 5 years of tamoxifen from [id number], which she tolerated well. In 2009 she noticed a chest wall mass. 11/23/10 FNA of chest wall mass showed ER + > 90%, NC + > 90%, MIB 50%, HER 2 + by FISH ratio 6.29. S/p BSO 12/27/10. Received zometa from 2010 to 2011. In 2011, she was started on q 3 week trastuzumab and exemestane, but states she took the exemestane sparingly due to joint pains and body aches. Has not taken any AI since 2013. Briefly took anastrozole in 5/2014. Stopped anastrozole 1/1/15 due to continued body and joint aches. Started Herceptin 1/9/17, stopped 4/25/17 due to progressive disease noted on scans Started Kadcyla 3.6 mg/kg every 21 days on 6/1/17, reduced to 3 mg/kg due to side effects on 6/26/17 Tykerb/ xeloda started 3/18 Interval History: She received a trial of diuretics yesterday, which led to hypotension requiring a fluid bolus back. She continues with dyspnea, but she reports it is much improved today. She is sitting up in the bed, getting ready to walk with PT. Not needing O2. No fevers.  and son at bedside. Medications reviewed in the EMR. Allergies Allergen Reactions  Pcn [Penicillins] Anaphylaxis  Contrast Agent [Iodine] Shortness of Breath and Itching Patient reported during phone conversation on 4/18/17. Review of Systems: A 6-point review of systems was obtained, negative except as reviewed in the HPI. Physical Exam:  
 
Visit Vitals  /70 (BP 1 Location: Left arm, BP Patient Position: At rest)  Pulse (!) 107  Temp 98 °F (36.7 °C)  Resp (!) 32  
 Ht 5' 6\" (1.676 m)  Wt 135 lb 3.2 oz (61.3 kg)  SpO2 97%  BMI 21.82 kg/m2 ECOG PS: 3 General: No distress, ill appearing Eyes: PERRLA, anicteric sclerae HENT: Atraumatic with normal appearance of ears and nose; OP clear Neck: Supple; no thyromegaly Lymphatic: No cervical, supraclavicular, or axillary adenopathy Respiratory: CTAB, normal respiratory effort CV: Normal rate, regular rhythm, no murmurs, no peripheral edema GI: Soft, nontender, nondistended, no masses, no hepatomegaly, no splenomegaly Skin: No rashes, ecchymoses, or petechiae. Normal temperature, turgor, and texture. Neuro/Psych: Alert, oriented, appropriate affect, normal judgment/insight Results:  
 
Lab Results Component Value Date/Time WBC 9.6 09/15/2018 02:24 AM  
 HGB 13.5 09/15/2018 02:24 AM  
 HCT 39.5 09/15/2018 02:24 AM  
 PLATELET 898 (H) 85/80/5127 02:24 AM  
 MCV 93.6 09/15/2018 02:24 AM  
 ABS. NEUTROPHILS 8.8 (H) 09/15/2018 02:24 AM  
 Hemoglobin (POC) 12.0 02/19/2018 02:56 PM  
 
Lab Results Component Value Date/Time Sodium 138 09/15/2018 02:24 AM  
 Potassium 4.2 09/15/2018 02:24 AM  
 Chloride 103 09/15/2018 02:24 AM  
 CO2 24 09/15/2018 02:24 AM  
 Glucose 107 (H) 09/15/2018 02:24 AM  
 BUN 11 09/15/2018 02:24 AM  
 Creatinine 0.59 09/15/2018 02:24 AM  
 GFR est AA >60 09/15/2018 02:24 AM  
 GFR est non-AA >60 09/15/2018 02:24 AM  
 Calcium 9.1 09/15/2018 02:24 AM  
 
Lab Results Component Value Date/Time Bilirubin, total 0.6 09/14/2018 12:18 PM  
 ALT (SGPT) 127 (H) 09/14/2018 12:18 PM  
 AST (SGOT) HEMOLYZED,RECOLLECT REQUESTED 09/14/2018 12:18 PM  
 Alk. phosphatase 163 (H) 09/14/2018 12:18 PM  
 Protein, total 7.4 09/14/2018 12:18 PM  
 Albumin 3.1 (L) 09/14/2018 12:18 PM  
 Globulin 4.3 (H) 09/14/2018 12:18 PM  
 
Lab Results Component Value Date/Time TSH 4.78 (H) 02/16/2018 09:11 PM  
 
Lab Results Component Value Date/Time D-dimer 2.04 (H) 09/14/2018 12:18 PM  
 
Lab Results Component Value Date/Time CA 27.29 401.0 (H) 07/12/2018 10:21 AM  
 
9/14/2018 XR CHEST IMPRESSION: 
Right greater than left basilar atelectasis/consolidation. Likely right-sided 
effusion. Maria R Colunga 9/14/2018 NM lung V/Q PERF IMPRESSION: 
Abnormal ventilation and perfusion; indeterminate/intermediate probability for 
pulmonary embolism. 
  
 
9/14/2018 DUPLEX LE Bilateral: negative 9/14/2018 CTA CHEST W or WO CONT: 
1. No Pulmonary Embolus. 2. Progression of reticular nodular lung disease. 3. New pleural effusions. 4. Stable osseous metastases. CXR 9/15/2018: Findings suggestive of pulmonary edema with right pleural effusion. Superimposed pneumonia cannot be excluded Assessment and Recommendations:  
 
1) Acute on chronic dyspnea Rapidly progressive over the past month. Improved this morning, after treatment with abx, steroids, diuretics. Uncertain etiology. No PE on CT. Possibly aspiration? Infection? Metastatic disease is possible as well. However, the rapid onset, radiographic appearance, and improvement this morning are not typical for for metastatic disease from breast cancer. This is also not a typical AE seen with her recent chemotherapy, and the progression despite stopping these medications 5-6 weeks ago would suggest they are not the cause. Continue supportive care. 2) Metastatic breast cancer ER+ HER2+ with brain mets/liver/bone mets Follows with Dr. Romana Bristle. Has had multiple lines of therapy. Most recently treatment with Tykerb/Xeloda which was discontinued 5-6 weeks ago. Her cancer is not curable and management is with palliative intent. If her performance status does not improve, she may not be a candidate for further systemic therapy. She will need close follow up with Dr. Romana Bristle on discharge. 3) Brain Mets 3/21/18 S/p left temporal lobe brain met resection and gamma knife Currently undergoing whole brain XRT under the care of Dr Tobias Melissa;  received 3/14 treatments on (9/14). Therapy now on hold due to her acute issues. 4) Bone Mets S/p Palliative XRT (L3-S1) 8/14-8/20 5) Abnormal LP Atypical cells noted on LP performed by Dr Candido Willett;  
Concern for leptomeningeal disease Plan for repeat LP at [address] [place] [address] with Dr Candido Willett to further eval  
 
 
 
 
Signed By: Tammie Harris MD

## 2018-09-16 NOTE — PROGRESS NOTES
[facility] Medical Progress Note NAME: Claudette Ruddle :  1960 MRM:  [id number] Date/Time: 2018 10:53 AM 
 
  
Assessment and Plan: Dyspnea / Tachypnea / Sinus tachycardia - POA, severe, unclear etiology. CTA ruled out PE. Lack of fever, sputum, hypoxia argue against PNA. Anxiety and effusions may contribute. Ativan helped. Pulm to consider tap. Oxygen and morphine for comfort. Palliative care consult. My partner saw her 18hr ago, gave trial of diuretic, but that caused lower BP, and NS bolus was given back. Overall, I doubt edema is cause. Breast cancer, stage 4 / Bone metastases / Liver metastases / Brain metastasis - Nearing end stage. No chemo due to poor functional status. Brain irradiation for palliative comfort. Consult oncology. Pneumonia - Possible, per pulmonary consult. Cx and serology negative so far. May be aspiration. Convert empiric azithro, ceftriaxone and flagyl to PO Augmentin for discharge, or nothing. Sepsis / Leukocytosis / Tachycardia / Tachypnea - POA, but unclear if infectious origin. WBC normal after 24hr. Never fever. Pulse and respirations persistent and may have non infectious causes as above. Anxiety - Prn xanan PO Fatigue / dysphagia - Fall and aspiration precautions. PT/OT/speech eval. 
 
 
  
Subjective: Chief Complaint:  Still dyspnea, but a bit better ROS: 
(bold if positive, if negative) SOB/SHINE Tolerating some PT  Tolerating Diet Objective:  
 
Last 24hrs VS reviewed since prior progress note. Most recent are: 
 
Visit Vitals  /70 (BP 1 Location: Left arm, BP Patient Position: At rest)  Pulse (!) 107  Temp 98 °F (36.7 °C)  Resp (!) 32  
 Ht 5' 6\" (1.676 m)  Wt 61.3 kg (135 lb 3.2 oz)  SpO2 98%  BMI 21.82 kg/m2 SpO2 Readings from Last 6 Encounters:  
09/15/18 98% 08/15/18 98% 18 97% 18 97% 07/10/18 97% 18 98% O2 Flow Rate (L/min): 2 l/min Intake/Output Summary (Last 24 hours) at 09/16/18 1053 Last data filed at 09/16/18 4007 Gross per 24 hour Intake              650 ml Output              500 ml Net              150 ml Physical Exam: 
 
Gen:  Frail, in mild acute distress HEENT:  Pink conjunctivae, PERRL, hearing intact to voice, moist mucous membranes Neck:  Supple, without masses, thyroid non-tender Resp:  Mild accessory muscle use, bilateral breath sounds without wheezes rales or rhonchi 
Card:  No murmurs, tachycardic S1, S2 without thrills, bruits or peripheral edema Abd:  Soft, non-tender, non-distended, normoactive bowel sounds are present, no mass Lymph:  No cervical or inguinal adenopathy Musc:  No cyanosis or clubbing Skin:  No rashes or ulcers, skin turgor is reduced Neuro:  Cranial nerves are grossly intact, general motor weakness, follows commands Psych:  Good insight, oriented to person, place and time, alert Telemetry reviewed:   normal sinus rhythm 
__________________________________________________________________ Medications Reviewed: (see below) Medications:  
 
Current Facility-Administered Medications Medication Dose Route Frequency  LORazepam (ATIVAN) injection 1 mg  1 mg IntraVENous Q4H PRN  
 morphine IR (MS IR) tablet 15 mg  15 mg Oral Q4H PRN  
 enoxaparin (LOVENOX) injection 40 mg  40 mg SubCUTAneous Q24H  
 sodium chloride (NS) flush 5-10 mL  5-10 mL IntraVENous Q8H  
 sodium chloride (NS) flush 5-10 mL  5-10 mL IntraVENous PRN  
 sodium chloride (NS) flush 5-10 mL  5-10 mL IntraVENous Q8H  
 sodium chloride (NS) flush 5-10 mL  5-10 mL IntraVENous PRN  
 acetaminophen (TYLENOL) tablet 650 mg  650 mg Oral Q6H PRN  
 oxyCODONE IR (ROXICODONE) tablet 5 mg  5 mg Oral Q4H PRN  prochlorperazine (COMPAZINE) injection 10 mg  10 mg IntraVENous Q6H PRN  
 naloxone (NARCAN) injection 0.4 mg  0.4 mg IntraVENous PRN  
  docusate sodium (COLACE) capsule 100 mg  100 mg Oral BID  cefTRIAXone (ROCEPHIN) 1 g in 0.9% sodium chloride (MBP/ADV) 50 mL  1 g IntraVENous Q24H  
 azithromycin (ZITHROMAX) 500 mg in 0.9% sodium chloride (MBP/ADV) 250 mL  500 mg IntraVENous Q24H  
 zolpidem (AMBIEN) tablet 5 mg  5 mg Oral QHS  promethazine (PHENERGAN) tablet 25 mg  25 mg Oral Q6H PRN  
 albuterol-ipratropium (DUO-NEB) 2.5 MG-0.5 MG/3 ML  3 mL Nebulization Q4H PRN  
 montelukast (SINGULAIR) tablet 10 mg  10 mg Oral DAILY  metroNIDAZOLE (FLAGYL) IVPB premix 500 mg  500 mg IntraVENous Q12H  
 dexamethasone (DECADRON) tablet 4 mg  4 mg Oral Q12H Lab Data Reviewed: (see below) Lab Review:  
 
Recent Labs  
   09/15/18 
 0224  09/14/18 
 1218 WBC  9.6  13.7* HGB  13.5  15.4 HCT  39.5  44.4 PLT  406*  420* Recent Labs  
   09/15/18 
 0224  09/14/18 
 1345  09/14/18 
 1218 NA  138   --   132* K  4.2  4.6  HEMOLYZED,RECOLLECT REQUESTED  
CL  103   --   97  
CO2  24   --   25 GLU  107*   --   101* BUN  11   --   17  
CREA  0.59   --   0.59 CA  9.1   --   9.3 MG  2.2   --    --   
PHOS  4.2   --    --   
ALB   --    --   3.1* TBILI   --    --   0.6 SGOT   --    --   HEMOLYZED,RECOLLECT REQUESTED  
ALT   --    --   127* No results found for: [facility] Recent Labs  
   09/15/18 
 1635 PH  7.36  
PCO2  42 PO2  79* HCO3  23 No results for input(s): INR in the last 72 hours. No lab exists for component: INREXT, INREXT All Micro Results Procedure Component Value Units Date/Time CULTURE, BLOOD, PERIPHERAL [[id number]] Collected:  09/14/18 1259 Order Status:  Completed Specimen:  Blood Updated:  09/16/18 6792 Special Requests: NO SPECIAL REQUESTS Culture result: NO GROWTH 2 DAYS     
 CULTURE, RESPIRATORY/SPUTUM/BRONCH Demetria Punches [[id number]] Collected:  09/15/18 1615 Order Status:  Completed Specimen:  Sputum from Sputum Updated:  09/16/18 0020 Special Requests: NO SPECIAL REQUESTS     
  GRAM STAIN RARE WBCS SEEN     
        
  RARE EPITHELIAL CELLS SEEN  
        
  OCCASIONAL GRAM POSITIVE COCCI IN PAIRS  
   RARE GRAM POSITIVE RODS Culture result: PENDING  
 RESPIRATORY PANEL,PCR,NASOPHARYNGEAL [[id number]] Collected:  09/14/18 1841 Order Status:  Completed Specimen:  Nasopharyngeal Updated:  09/14/18 2229 Adenovirus NOT DETECTED Coronavirus 229E NOT DETECTED Coronavirus HKU1 NOT DETECTED Coronavirus CVNL63 NOT DETECTED Coronavirus OC43 NOT DETECTED Metapneumovirus NOT DETECTED Rhinovirus and Enterovirus NOT DETECTED Influenza A NOT DETECTED Influenza A, subtype H1 NOT DETECTED Influenza A, subtype H3 NOT DETECTED INFLUENZA A H1N1 PCR NOT DETECTED Influenza B NOT DETECTED Parainfluenza 1 NOT DETECTED Parainfluenza 2 NOT DETECTED Parainfluenza 3 NOT DETECTED Parainfluenza virus 4 NOT DETECTED     
  RSV by PCR NOT DETECTED Bordetella pertussis - PCR NOT DETECTED Chlamydophila pneumoniae DNA, QL, PCR NOT DETECTED Mycoplasma pneumoniae DNA, QL, PCR NOT DETECTED MYCOPLASMA AB, IGG/IGM [[id number]] Collected:  09/14/18 1841 Order Status:  Completed Specimen:  Serum Updated:  09/14/18 1920 LEE Chavez, UR/CSF [[id number]] Collected:  09/14/18 1530 Order Status:  Canceled Specimen:  Other LEGIONELLA PNEUMOPHILA AG, URINE [[id number]] Collected:  09/14/18 1530 Order Status:  Canceled Specimen:  Urine from Urine CULTURE, BLOOD, PERIPHERAL [[id number]] Order Status:  Canceled Specimen:  Blood I have reviewed notes of prior 24hr. Other pertinent lab: none Total time spent with patient: 45 Minutes Care Plan discussed with: Patient, Family, Nursing Staff, Consultant/Specialist and >50% of time spent in counseling and coordination of care Discussed:  Care Plan Prophylaxis:  H2B/PPI Disposition:  Home w/Family 
        
___________________________________________________ Attending Physician: Nicola Omalley MD

## 2018-09-16 NOTE — CONSULTS
Name: Erwin Sensor: [facility]  
: 1960 Admit Date: 2018 Phone: [phone]  Room: [facility]/01 PCP: Yue Hernandez MD  MRN: [id number] Date: 2018  Code: DNR   
   
 
 
 D/w hospitalist Dr Esthela Springer yesterday pm after chart review - see my note from yesterday 
( pt was not signed out for weekend and missed on rounds yesterday am) Continues episodic dyspnea Overall evaluation unrevealing Looks likely pulmonary met disease- effusions new -? Choking spells ? VCD brainstem issues Neg PE/DVT evals. .. 
 
------------------------------------------ Initial Consult Chart and notes reviewed. Data reviewed. I review the patient's current medications in the medical record at each encounter.  I have evaluated and examined the patient. 3:49 PM    
 
History was obtained from patient. I was asked by Greer Rodriguez DO to see Jonn Herrmann in consultation for a chief complaint of dyspnea, shortness of breath . History of Present Illness:   is very pleasant, 62year old lady that presented to [facility] today after radiation for worsening shortness of breath, nausea, and vomiting. She has a history of metastatic breast cancer and is undergoing radiation currently. Tells me that her SOB has been worse over the last week. Has nausea/vomiting associated with that and tells me because her phlegm is so thick, this causes her to vomit. She had a bronchoscopy done per  in July due to abnormal chest CT that demonstrated nodular opacities bilaterally. She has gone through two rounds of antibiotics, one of them being Levaquin and the other she is unsure of, for her symptoms, without improvement. Denies fever or chills. Denies CP, LE pain, or swelling. Daughter notes she has had trouble swallowing. Appetite very poor. No abdominal pain or diarrhea. Images:  Personally reviewed. CXR:   Right greater than left basilar/atlectasis/consolidation VQ Scan:  Abnormal ventilatio and perfusion, indeterminate/intermediate probability for pulmonary embolism. WBC 13.7 Hgb 15.4 Na 132 Creat . 61  Trop negative Pro- Past Medical History:  
Diagnosis Date  Breast CA ([facility] [place] [address]  Metastatic cancer ([facility] [place] [address] Past Surgical History:  
Procedure Laterality Date  BREAST SURGERY PROCEDURE UNLISTED Left 2004  
 mastectomy  HX GYN  2010  
 ovaries removed  HX VASCULAR ACCESS Right   
 portacath Family History Problem Relation Age of Onset  Hypertension Mother  Cancer Father   
  mesothelioma  Cancer Maternal Grandfather 76 Bladder Social History Substance Use Topics  Smoking status: Never Smoker  Smokeless tobacco: Never Used  Alcohol use Yes Comment: 3-4 drinks/month Allergies Allergen Reactions  Pcn [Penicillins] Anaphylaxis  Contrast Agent [Iodine] Shortness of Breath and Itching Patient reported during phone conversation on 4/18/17. Current Facility-Administered Medications Medication Dose Route Frequency  LORazepam (ATIVAN) injection 1 mg  1 mg IntraVENous Q4H PRN  
 morphine IR (MS IR) tablet 15 mg  15 mg Oral Q4H PRN  
 enoxaparin (LOVENOX) injection 40 mg  40 mg SubCUTAneous Q24H  
 sodium chloride (NS) flush 5-10 mL  5-10 mL IntraVENous Q8H  
 sodium chloride (NS) flush 5-10 mL  5-10 mL IntraVENous PRN  
 sodium chloride (NS) flush 5-10 mL  5-10 mL IntraVENous Q8H  
 sodium chloride (NS) flush 5-10 mL  5-10 mL IntraVENous PRN  
 acetaminophen (TYLENOL) tablet 650 mg  650 mg Oral Q6H PRN  
 oxyCODONE IR (ROXICODONE) tablet 5 mg  5 mg Oral Q4H PRN  prochlorperazine (COMPAZINE) injection 10 mg  10 mg IntraVENous Q6H PRN  
 naloxone (NARCAN) injection 0.4 mg  0.4 mg IntraVENous PRN  
 docusate sodium (COLACE) capsule 100 mg  100 mg Oral BID  
  cefTRIAXone (ROCEPHIN) 1 g in 0.9% sodium chloride (MBP/ADV) 50 mL  1 g IntraVENous Q24H  
 azithromycin (ZITHROMAX) 500 mg in 0.9% sodium chloride (MBP/ADV) 250 mL  500 mg IntraVENous Q24H  
 zolpidem (AMBIEN) tablet 5 mg  5 mg Oral QHS  promethazine (PHENERGAN) tablet 25 mg  25 mg Oral Q6H PRN  
 albuterol-ipratropium (DUO-NEB) 2.5 MG-0.5 MG/3 ML  3 mL Nebulization Q4H PRN  
 montelukast (SINGULAIR) tablet 10 mg  10 mg Oral DAILY  metroNIDAZOLE (FLAGYL) IVPB premix 500 mg  500 mg IntraVENous Q12H  
 dexamethasone (DECADRON) tablet 4 mg  4 mg Oral Q12H  
 
 
VITALS: 
Patient Vitals for the past 24 hrs: 
 Temp Pulse Resp BP SpO2  
09/16/18 0645 - (!) 107 - - -  
09/16/18 0508 98 °F (36.7 °C) (!) 116 (!) 32 - -  
09/15/18 2207 - (!) 118 - - -  
09/15/18 2013 97.6 °F (36.4 °C) (!) 121 (!) 35 101/70 98 % 09/15/18 1540 97.8 °F (36.6 °C) (!) 130 (!) 40 118/70 97 % 09/15/18 1404 - (!) 126 - - -  
09/15/18 1145 - (!) 111 - - -  
09/15/18 1122 97.3 °F (36.3 °C) (!) 115 26 103/72 93 % Physical Exam:  
 
 
General:  Alert, cooperative,tachypnic at rest, appears stated age. Head:  Normocephalic, without obvious abnormality, atraumatic. Eyes:  Conjunctivae/corneas clear. PERRL, EOMs intact. Nose: Nares normal. Septum midline. Mucosa normal. No drainage or sinus tenderness. Throat: Lips, mucosa, and tongue normal. Teeth and gums normal.  
Neck: Supple, symmetrical, trachea midline, no adenopathy Lungs:   Clear to auscultation bilaterally. Chest wall:  No tenderness or deformity. Heart:  Regular rate and rhythm, S1, S2 normal, no murmur, click, rub or gallop. Abdomen:   Soft, non-tender. Bowel sounds normal.   
Extremities: Extremities normal, atraumatic, no cyanosis or edema. Pulses: 2+ and symmetric all extremities. Skin: Skin color, texture, turgor normal.   
Lymph nodes: Cervical nodes normal.  
Neurologic: Grossly nonfocal  
 
 
Lab Results Component Value Date/Time Sodium 138 09/15/2018 02:24 AM  
 Potassium 4.2 09/15/2018 02:24 AM  
 Chloride 103 09/15/2018 02:24 AM  
 CO2 24 09/15/2018 02:24 AM  
 BUN 11 09/15/2018 02:24 AM  
 Creatinine 0.59 09/15/2018 02:24 AM  
 Glucose 107 (H) 09/15/2018 02:24 AM  
 Calcium 9.1 09/15/2018 02:24 AM  
 Magnesium 2.2 09/15/2018 02:24 AM  
 Phosphorus 4.2 09/15/2018 02:24 AM  
 
 
Lab Results Component Value Date/Time WBC 9.6 09/15/2018 02:24 AM  
 HGB 13.5 09/15/2018 02:24 AM  
 PLATELET 441 (H) 07/58/7444 02:24 AM  
 MCV 93.6 09/15/2018 02:24 AM  
 
 
Lab Results Component Value Date/Time AST (SGOT) HEMOLYZED,RECOLLECT REQUESTED 09/14/2018 12:18 PM  
 Alk. phosphatase 163 (H) 09/14/2018 12:18 PM  
 Protein, total 7.4 09/14/2018 12:18 PM  
 Albumin 3.1 (L) 09/14/2018 12:18 PM  
 Globulin 4.3 (H) 09/14/2018 12:18 PM  
 
 
 
Lab Results Component Value Date/Time TSH 4.78 (H) 02/16/2018 09:11 PM  
  
 
Lab Results Component Value Date/Time PH 7.36 09/15/2018 04:35 PM  
 PCO2 42 09/15/2018 04:35 PM  
 PO2 79 (L) 09/15/2018 04:35 PM  
 HCO3 23 09/15/2018 04:35 PM  
 
 
Lab Results Component Value Date/Time Troponin-I, Qt. <0.05 09/14/2018 12:18 PM  
  
 
Lab Results Component Value Date/Time Culture result: PENDING 09/15/2018 04:15 PM  
 Culture result: NO GROWTH 2 DAYS 09/14/2018 12:59 PM  
 Culture result: MODERATE NORMAL RESPIRATORY KAYLA 07/17/2018 01:51 PM  
 Culture result: NO FUNGUS ISOLATED 33 DAYS 07/17/2018 01:51 PM  
 
 
 
Lab Results Component Value Date/Time  Color YELLOW/STRAW 09/15/2018 02:38 AM  
 Appearance CLEAR 09/15/2018 02:38 AM  
 pH (UA) 6.0 09/15/2018 02:38 AM  
 Protein NEGATIVE  09/15/2018 02:38 AM  
 Glucose NEGATIVE  09/15/2018 02:38 AM  
 Ketone NEGATIVE  09/15/2018 02:38 AM  
 Bilirubin NEGATIVE  09/15/2018 02:38 AM  
 Blood NEGATIVE  09/15/2018 02:38 AM  
 Urobilinogen 0.2 09/15/2018 02:38 AM  
 Nitrites NEGATIVE  09/15/2018 02:38 AM  
 Leukocyte Esterase NEGATIVE  09/15/2018 02:38 AM  
 WBC 0-4 09/15/2018 02:38 AM  
 RBC 0-5 09/15/2018 02:38 AM  
 Bacteria NEGATIVE  09/15/2018 02:38 AM  
 
 
IMPRESSION 
· Acute Respiratory distress with mild Hypoxia · Never smoker · Episodic dyspnea/ choking episodes - ? Element anxiety/ VCD · ? Episodic choking/swallowing d/o · Abnormal Chest CT:  Neg bronchoscopy on 7/17; ? Progressive metastatic disease? · Widely metastatic Breast Cancer- liver/bone/brain-- off rx · Pleural effusions- modest 
· Neg eval for PE /DVT---> tx dose lovenox just stopped · nml LVEF by ECHO; mild diastolic dysfunction/ normal RV 
 
PLAN 
· Supplemental O2 to keep sats >90% · Empiric course abx cover possible aspiration · Needs tailored ba swallow w speech-- ordered · rx ? Asthma- tried dulera once. Kiki Arteaga · Reflux precautions elevate HOB/ NPO 3 hours prior bed · Start protonix ( active gerd sx) · rx anxiety - ? Push palliation · DVT Prophylaxis:  Lovenox D/w family at bedside and Dr Halima Tsang MD

## 2018-09-16 NOTE — PROGRESS NOTES
SHIFT CHANGE: 
1930 Bedside and Verbal shift change report given to Kaylynn KATE (oncoming nurse) by Arthur Gonzales (offgoing nurse). Report included the following information SBAR, Kardex, MAR and Recent Results. SHIFT SUMMARY: 
2000  Patient resting, sister in law, Sol Marley at bedside - will stay all night. Mews score of 5 for tachypneia and tachycardia, both expected findings. Patient placed on 2 liters NC, sats >95%. Will continue to monitor. No pain reported, just wanting more sleep. 2147  Patient requesting no further intervention tonight, no vital sign checks since sleep has been hard to get. Family to stay at bedside and support patient. Family instructed to call for any difficulty breathing, pain/discomfort or anxiety. Will respect patients wishes to avoid being awakened at 18 for VS.   Reported to the charge RN. 
1926  Patient's family member called out that the monitor was alarming. Silenced alarm, patient asleep at this time, in no apparent distress. Alarm silenced, no other interventions per patient's stated wishes. NO LAB WORK ORDERED this am. 
0311  Patient's , eyes closed, chest rising and falling evenly and unlabored. 0500  Patient tolerated flagyl, VS stable at this time, though remains tachypneic, and tachycardic. Request for ativan at this time to enable more rest, and handle anxiety effectively. No new issues to report. Per Sol Marley (relative), patient tolerated small bites of dry cereal, \"no choking\". END OF SHIFT REPORT: 
0730  Bedside and Verbal shift change report given to Arthur Gonzales (oncoming nurse) by Geovanna Griffin (offgoing nurse). Report included the following information Kardex, MAR, Intake & Output.

## 2018-09-16 NOTE — PROGRESS NOTES
Problem: Self Care Deficits Care Plan (Adult) Goal: *Acute Goals and Plan of Care (Insert Text) Occupational Therapy Goals Initiated 9/16/2018 1. Patient will perform standing ADL task with modified independence and no SOB within 7 day(s). 2.  Patient will perform upper body dressing and bathing with modified independence within 7 day(s). 3.  Patient will perform lower body dressing and bathing with modified independence within 7 day(s). 4.  Patient will perform toilet transfers with modified independence within 7 day(s). 5.  Patient will perform all aspects of toileting with modified independence within 7 day(s). 6.  Patient will participate in upper extremity therapeutic exercise/activities with supervision/set-up for 10  minutes within 7 day(s). 7.  Patient will be able to verbalize 5 energy conservation techniques during functional activities within 7 day(s). Occupational Therapy EVALUATION Patient: Sade Masterson (58 y.o. female) Date: 9/16/2018 Primary Diagnosis: Dyspnea Precautions:   Aspiration, Fall, DNR 
 
ASSESSMENT : 
 
Chart reviewed and patient cleared by RN for therapy. Patient with complicated PMH including tachycardia, metastatic CA, and pulmonary HTN. Patient agreeable to evaluation and her son and  were also present. Overall patient is completing ADLS/functional mobility with CGA. She requires CGA secondary to decreased endurance, decreased balance, impulsivity, unsafe use of RW, and decreased safety awareness. Left eye remained closed which patient stated is a side effect of radiation. Patient moved very quickly during evaluation and often did not even wait for therapist to finish giving instructions before she stood and began to mobilize. Poor carryover of education re: safe use of RW. Patient would benefit from 24/7 supervision when discharged home secondary to impulsivity and decreased safety awareness.   Patient required rest breaks during evaluation after standing for approx 1 min-  O2 sats 86% on room air after activity. Patient will benefit from skilled intervention to address the above impairments. Patients rehabilitation potential is considered to be Good Factors which may influence rehabilitation potential include:  
[]             None noted []             Mental ability/status []             Medical condition []             Home/family situation and support systems []             Safety awareness []             Pain tolerance/management [x]             Other: impulsivity PLAN : 
Recommendations and Planned Interventions: 
[x]               Self Care Training                  [x]        Therapeutic Activities [x]               Functional Mobility Training    []        Cognitive Retraining 
[x]               Therapeutic Exercises           [x]        Endurance Activities [x]               Balance Training                   [x]        Neuromuscular Re-Education []               Visual/Perceptual Training     [x]   Home Safety Training 
[x]               Patient Education                 []        Family Training/Education []               Other (comment): Frequency/Duration: Patient will be followed by occupational therapy 5 times a week to address goals. Discharge Recommendations: To Be Determined Further Equipment Recommendations for Discharge: TBD. Patient and family very quick to say they did not need any DME. SUBJECTIVE:  
Patient stated I slept better last night b/c I had drugs. \" OBJECTIVE DATA SUMMARY:  
HISTORY:  
Past Medical History:  
Diagnosis Date  Breast CA ([facility] [address]  Metastatic cancer ([facility] [address] Past Surgical History:  
Procedure Laterality Date  BREAST SURGERY PROCEDURE UNLISTED Left 2004  
 mastectomy  HX GYN  2010  
 ovaries removed  HX VASCULAR ACCESS Right   
 portacath Prior Level of Function/Environment/Context:  Complex PMH but independent PLOF, lives with  and son but they are not home all the time Expanded or extensive additional review of patient history:  
 
Home Situation Home Environment: Private residence # Steps to Enter: 5 Rails to Enter: Yes Hand Rails : Bilateral 
One/Two Story Residence: Two story # of Interior Steps: 15 Interior Rails: Right Living Alone: No 
Support Systems: Spouse/Significant Other/Partner, Child(thomas) Patient Expects to be Discharged to[de-identified] Private residence Current DME Used/Available at Home: None Tub or Shower Type: Tub/Shower combination Hand dominance: Right EXAMINATION OF PERFORMANCE DEFICITS: 
Cognitive/Behavioral Status: 
Neurologic State: Alert Orientation Level: Oriented X4 Cognition: Follows commands 
 flat affect 
impulsive Safety/Judgement: Decreased awareness of need for safety Skin: intact, IV line Edema: none in the uppers Vision/Perceptual:   
    
   Did not report any recent changes in vision. L eye closed during evaluation which appears to be a side effect of radiation (per chart review). Range of Motion WFL Strength: WFL although generally decreased Coordination: 
  
Fine Motor Skills-Upper: Left Intact; Right Intact Gross Motor Skills-Upper: Left Intact; Right Intact Tone & Sensation: 
WNL. Able to open toothpaste without difficulty Balance: 
 standing: impaired, benefits from RW Sitting: intact  During ADLs performed EOB Functional Mobility and Transfers for ADLs: 
Bed Mobility: 
Supine to Sit: Modified independent Sit to Supine: Modified independent Transfers: 
Sit to Stand: Contact guard assistance Stand to Sit: Contact guard assistance Bed to Chair: Contact guard assistance Toilet Transfer : Contact guard assistance Shower Transfer: Minimum assistance ADL Assessment: 
Feeding: Setup Oral Facial Hygiene/Grooming: Setup, vc to use soap when washing hands, able to comb hair and brush teeth in standing Bathing: Setup Upper Body Dressing: Setup Lower Body Dressing: Contact guard assistance Toileting: Contact guard assistance Cognitive Retraining Safety/Judgement: Decreased awareness of need for safety Functional Measure: 
Barthel Index: 
 
Bathin Bladder: 10 Bowels: 10 
Groomin Dressin Feeding: 10 Mobility: 0 Stairs: 0 Toilet Use: 5 Transfer (Bed to Chair and Back): 10 Total: 55 Barthel and G-code impairment scale: 
Percentage of impairment CH 
0% CI 
1-19% CJ 
20-39% CK 
40-59% CL 
60-79% CM 
80-99% CN 
100% Barthel Score 0-100 100 99-80 79-60 59-40 20-39 1-19 
 0 Barthel Score 0-20 20 17-19 13-16 9-12 5-8 1-4 0 The Barthel ADL Index: Guidelines 1. The index should be used as a record of what a patient does, not as a record of what a patient could do. 2. The main aim is to establish degree of independence from any help, physical or verbal, however minor and for whatever reason. 3. The need for supervision renders the patient not independent. 4. A patient's performance should be established using the best available evidence. Asking the patient, friends/relatives and nurses are the usual sources, but direct observation and common sense are also important. However direct testing is not needed. 5. Usually the patient's performance over the preceding 24-48 hours is important, but occasionally longer periods will be relevant. 6. Middle categories imply that the patient supplies over 50 per cent of the effort. 7. Use of aids to be independent is allowed. Marlene Jaquez., Barthel, D.W. (3239). Functional evaluation: the Barthel Index. [facility] (14)2. [facility] FARRUKH Martino, Malou Tello., Annetta Adames., [place], [address] ().  Measuring the change indisability after inpatient rehabilitation; comparison of the responsiveness of the Barthel Index and Functional [place] Measure. Journal of Neurology, Neurosurgery, and Psychiatry, 66(4), 440-395. TOMMIE Martin, NGA Deleon, & Diomedes Campuzano M.A. (2004.) Assessment of post-stroke quality of life in cost-effectiveness studies: The usefulness of the Barthel Index and the EuroQoL-5D. [facility], 13, 010-32 G codes: In compliance with CMSs Claims Based Outcome Reporting, the following G-code set was chosen for this patient based on their primary functional limitation being treated: The outcome measure chosen to determine the severity of the functional limitation was the barthel with a score of 55/100 which was correlated with the impairment scale. ? Self Care:  
  - CURRENT STATUS: CK - 40%-59% impaired, limited or restricted  - GOAL STATUS: CJ - 20%-39% impaired, limited or restricted  - D/C STATUS:  ---------------To be determined--------------- Occupational Therapy Evaluation Charge Determination History Examination Decision-Making HIGH Complexity : Extensive review of history including physical, cognitive and psychosocial history  HIGH Complexity : 5 or more performance deficits relating to physical, cognitive , or psychosocial skils that result in activity limitations and / or participation restrictions HIGH Complexity : Patient presents with comorbidities that affect occupational performance. Signifigant modification of tasks or assistance (eg, physical or verbal) with assessment (s) is necessary to enable patient to complete evaluation Based on the above components, the patient evaluation is determined to be of the following complexity level: HIGH Pain: none Activity Tolerance:  
Poor Please refer to the flowsheet for vital signs taken during this treatment. After treatment:  
[] Patient left in no apparent distress sitting up in chair 
[x] Patient left in no apparent distress in bed 
[x] Call bell left within reach [x] Nursing notified 
[x] Caregiver present 
[] Bed alarm activated COMMUNICATION/EDUCATION:  
The patients plan of care was discussed with: Registered Nurse. [x] Home safety education was provided and the patient/caregiver indicated understanding. [x] Patient/family have participated as able in goal setting and plan of care. [x] Patient/family agree to work toward stated goals and plan of care. [] Patient understands intent and goals of therapy, but is neutral about his/her participation. [] Patient is unable to participate in goal setting and plan of care. This patients plan of care is appropriate for delegation to [facility]. Thank you for this referral. 
Yang Díaz OT Time Calculation: 15 mins

## 2018-09-17 NOTE — PROGRESS NOTES
HH orders noted, sent referral to [facility] in [facility].  Thanks JANET Tadeo

## 2018-09-17 NOTE — PROGRESS NOTES
0734 
Pt up in bathroom  in room with pt will return  
 
0803 Pt lying in bed voiced no complaints denies any pain. [phone] Pt off the floor for barium swallow [facility] Notified Dr Cassie Nur regarding pt's MEWS score no new orders received [address] Spoke with Dorothy Karimi to update on pt's discharge pt informed she can go to Radiology oncology once discharged they will be expecting her [address] Pt to be given D/C instructions by ADT nurse Venessa Hobson

## 2018-09-17 NOTE — PROGRESS NOTES
Nurse notified by [facility] that pt is upset over meds not at pharmacy asked me to call family to assist them. Called [facility] pt's  [phone] to clarify. Pt received nebulizer treatments but has no machine at home and requested Seb. Notified Dr Nancy Cartagena and Aurelia Madsen case management. Pt cam  nebulizer at the hospital Dr Nancy Cartagena called script in for Ambien

## 2018-09-17 NOTE — PROGRESS NOTES
PCP ARTURO appt scheduled with Dr. Bhavin Alejandre on 9/19/2018 12:30pm Appt added to AVS. YAMIL Rao CM Specialist

## 2018-09-17 NOTE — DISCHARGE INSTRUCTIONS
Patient Discharge Instructions    Susan Coto / [id number] : 1960    Admitted 2018 Discharged: 2018     Primary Diagnoses  Problem List as of 2018  Date Reviewed: 7/10/2018          Codes Class Noted - Resolved   Sinus tachycardia   Tachypnea   * (Principal)Dyspnea   Pneumonia   SHINE (dyspnea on exertion)   Brain metastasis (HCC)   Cough   Brain mass   Fatigue   Anxiety   Bone metastases (HCC)   Liver metastases (HCC)   Breast cancer, stage 4 (HCC)          Take Home Medications     · It is important that you take the medication exactly as they are prescribed. · Keep your medication in the bottles provided by the pharmacist and keep a list of the medication names, dosages, and times to be taken in your wallet. · Do not take other medications without consulting your doctor. What to do at Home    Recommended diet: Regular Diet, Clear liquids, advance as tolerated and Comfort feeding    Recommended activity: Activity as tolerated    If you experience worse symptoms, please follow up with oncology, PCP, pulmonary. Follow-up with your PCP in a few weeks        Information obtained by :  I understand that if any problems occur once I am at home I am to contact my physician. I understand and acknowledge receipt of the instructions indicated above.                                                                                                                                            Physician's or R.N.'s Signature                                                                  Date/Time                                                                                                                                              Patient or Representative Signature                                                          Date/Time

## 2018-09-17 NOTE — PROGRESS NOTES
Discharge instructions, including information on new medcations, were reviewed with patient and her . All questions were answered. Patient received a copy of her discharge papers and 5 prescriptions and was discharge home with her . Case Management has set up home health PT, speech and RN through [employer]. Patient was to report to the [address] [place] [address] directly upon discharge for her radiation treatment. Right port was de-accessed with out difficulty (per protocol) Patient left with a rolling walker that had been supplied by case management.

## 2018-09-17 NOTE — PROGRESS NOTES
[facility] [place] [address] [facility], [address] [facility] W: [phone]  F: [phone] Reason for Visit:  
Nata To is a 62 y.o. female who is seen in consultation at the request of Dr. Lexie Kruse for evaluation of metastattic breast cancer with brain mets; recs for anticoagulation and radiation. Hematology / Oncology Treatment History:  
Diagnosis: Metastatic breast cancer to liver and bone, now brain 
  
TREATMENT COURSE: Naomi [place] started 6/17 and stopped 10/17.   
4/2004, s/p L mastectomy with Dr. Geraldine Verma at [facility]. Stage IIB, T2N1M0, 3/39 LN +, ER negative, OK +, HER 2 +, was enrolled on NSABP B-31 trial with adriamycin/cytoxan q 3 weeks x 4 followed by taxol weekly with herceptin x 12 then completion of a year of herceptin. States she did not receive XRT. Did receive 5 years of tamoxifen from 1471-7119, which she tolerated well. In 2009 she noticed a chest wall mass. 11/23/10 FNA of chest wall mass showed ER + > 90%, OK + > 90%, MIB 50%, HER 2 + by FISH ratio 6.29. S/p BSO 12/27/10. Received zometa from 2010 to 2011. In 2011, she was started on q 3 week trastuzumab and exemestane, but states she took the exemestane sparingly due to joint pains and body aches. Has not taken any AI since 2013. Briefly took anastrozole in 5/2014. Stopped anastrozole 1/1/15 due to continued body and joint aches. Started Herceptin 1/9/17, stopped 4/25/17 due to progressive disease noted on scans Started Kadcyla 3.6 mg/kg every 21 days on 6/1/17, reduced to 3 mg/kg due to side effects on 6/26/17 Tykerb/ xeloda started 3/18 Interval History:  
Reports breathing has improved \" some\" since admission. Denies any pain. Denies any N/V. Would like to go for XRT treatment today if she does not get done with swallowing test too late.   at bedside. Medications reviewed in the EMR. Allergies Allergen Reactions  Pcn [Penicillins] Anaphylaxis  Contrast Agent [Iodine] Shortness of Breath and Itching Patient reported during phone conversation on 4/18/17. Review of Systems: A 6-point review of systems was obtained, negative except as reviewed in the HPI. Physical Exam:  
 
Visit Vitals  /87 (BP 1 Location: Right arm, BP Patient Position: At rest)  Pulse (!) 112  Temp 97.4 °F (36.3 °C)  Resp 21  
 Ht 5' 6\" (1.676 m)  Wt 61.3 kg (135 lb 3.2 oz)  SpO2 92%  BMI 21.82 kg/m2 ECOG PS: 3 General: No distress, ill appearing Eyes: PERRLA, anicteric sclerae HENT: Atraumatic with normal appearance of ears and nose; OP clear Neck: Supple; no thyromegaly Respiratory: CTAB, normal respiratory effort CV: tachy rate, regular rhythm, no murmurs, no peripheral edema GI: Soft, nontender, nondistended, no masses, no hepatomegaly, no splenomegaly Skin: No rashes, ecchymoses, or petechiae. Normal temperature, turgor, and texture. Neuro/Psych: Alert, oriented, flat affect, normal judgment/insight Results:  
 
Lab Results Component Value Date/Time WBC 9.6 09/15/2018 02:24 AM  
 HGB 13.5 09/15/2018 02:24 AM  
 HCT 39.5 09/15/2018 02:24 AM  
 PLATELET 604 (H) 71/61/8198 02:24 AM  
 MCV 93.6 09/15/2018 02:24 AM  
 ABS. NEUTROPHILS 8.8 (H) 09/15/2018 02:24 AM  
 Hemoglobin (POC) 12.0 02/19/2018 02:56 PM  
 
Lab Results Component Value Date/Time Sodium 138 09/15/2018 02:24 AM  
 Potassium 4.2 09/15/2018 02:24 AM  
 Chloride 103 09/15/2018 02:24 AM  
 CO2 24 09/15/2018 02:24 AM  
 Glucose 107 (H) 09/15/2018 02:24 AM  
 BUN 11 09/15/2018 02:24 AM  
 Creatinine 0.59 09/15/2018 02:24 AM  
 GFR est AA >60 09/15/2018 02:24 AM  
 GFR est non-AA >60 09/15/2018 02:24 AM  
 Calcium 9.1 09/15/2018 02:24 AM  
 
Lab Results Component Value Date/Time  Bilirubin, total 0.6 09/14/2018 12:18 PM  
 ALT (SGPT) 127 (H) 09/14/2018 12:18 PM  
 AST (SGOT) HEMOLYZED,RECOLLECT REQUESTED 09/14/2018 12:18 PM  
 Alk. phosphatase 163 (H) 2018 12:18 PM  
 Protein, total 7.4 2018 12:18 PM  
 Albumin 3.1 (L) 2018 12:18 PM  
 Globulin 4.3 (H) 2018 12:18 PM  
 
Lab Results Component Value Date/Time TSH 4.78 (H) 2018 09:11 PM  
 
Lab Results Component Value Date/Time D-dimer 2.04 (H) 2018 12:18 PM  
 
Lab Results Component Value Date/Time CA 27.29 401.0 (H) 2018 10:21 AM  
 
2018 XR CHEST IMPRESSION: 
Right greater than left basilar atelectasis/consolidation. Likely right-sided 
effusion. Cydne Chayo 2018 NM lung V/Q PERF IMPRESSION: 
Abnormal ventilation and perfusion; indeterminate/intermediate probability for 
pulmonary embolism. 
  
 
2018 DUPLEX LE Bilateral: negative 2018 CTA CHEST W or WO CONT: 
1. No Pulmonary Embolus. 2. Progression of reticular nodular lung disease. 3. New pleural effusions. 4. Stable osseous metastases. CXR 9/15/2018: Findings suggestive of pulmonary edema with right pleural effusion. Superimposed pneumonia cannot be excluded Assessment and Recommendations:  
 
1) Acute on chronic dyspnea Uncertain etiology. Rapidly progressive over the past month. Some Improvement noted after treatment with abx, steroids, diuretics. No PE on CT. Possibly aspiration? Infection? Metastatic disease is possible as well. However, the rapid onset, radiographic appearance, and improvement this morning are not typical for for metastatic disease from breast cancer. This is also not a typical AE seen with her recent chemotherapy, and the progression despite stopping these medications 5-6 weeks ago would suggest they are not the cause. Pulmonary followin min walk pending Continue supportive care. 2) Metastatic breast cancer ER+ HER2+ with brain mets/liver/bone mets Follows with Dr. Zenon Fleming. Has had multiple lines of therapy.   Most recently treatment with Tykerb/Xeloda which was discontinued 5-6 weeks ago.  Her cancer is not curable and management is with palliative intent. If her performance status does not improve, she may not be a candidate for further systemic therapy. She will need close follow up with Dr. Harvey Kirkland on discharge. 3) Brain Mets 3/21/18 S/p left temporal lobe brain met resection and gamma knife Currently undergoing whole brain XRT under the care of Dr [address];  received 3/14 treatments on (9/14). Tentatively planned for treatment today; with plan to discharge and then proceed to Rad/Onc as outpatient. Discussed with nursing and Rad/Onc. 4) Bone Mets S/p Palliative XRT (L3-S1) 8/14-8/20 5) Abnormal LP Atypical cells noted on LP performed by Dr Cluadia Roe;  
Concern for leptomeningeal disease Plan for repeat LP at [facility] with Dr Claudia Roe to further eval  
 
6) Dysphagia Scheduled for swallowing test this am 
Discussed with hospitalist; then probable discharge Plan reviewed with Dr Moon.   
 
 
Signed By: Miguel A Menjivar, NICK

## 2018-09-17 NOTE — PROGRESS NOTES
SHIFT CHANGE: 
1930 Bedside and Verbal shift change report given to Kaylynn KATE (oncoming nurse) by May Alfaro (offgoing nurse). Report included the following information SBAR, Kardex, MAR and Recent Results. SHIFT SUMMARY: 
1930  Patient sleeping,  Rock Patrick instructed to call when patient awakened. 2011  Patient back to bed after voiding, slight confusion about the room. \"I thought for a minute we were in a different room\", but acknowledges she's in [facility].  Patient reoriented. 2132  Refusing mucinex at this time.  bedside, will stay with patient over night. Once again patient requesting to be left alone at midnight. No midnight vital signs specifically requested, as she wants uninterrupted sleep.  agrees to call for any concerns of pain or labored breathing. Will continue to monitor within patients expressed wishes. 2215  Patient requesting ativan for anxiety, given as prescribed. NO LAB WORK ORDERED THIS AM 
2356  Patient resting, eyes closed, chest rising and falling evenly and unlabored. Will not wake for vitals as instructed. 0216  Patient resting, . 
0500  Patient given flagyl. No pain reported at this time. Mews scores of 4 tonight for tachypneia and tachycardia, and expected finding given her history. Patient Vitals for the past 12 hrs: 
 Temp Pulse Resp BP SpO2  
09/17/18 0447 98.3 °F (36.8 °C) (!) 108 21 93/69 94 % 09/16/18 2309 - (!) 116 - - -  
09/16/18 2001 97.6 °F (36.4 °C) (!) 127 27 124/79 96 % 09/16/18 1842 - - - - 97 % END OF SHIFT REPORT: 
0730  Bedside and Verbal shift change report given to Fredo (oncoming nurse) by Vanessa Boone (offgoing nurse). Report included the following information SBAR, Kardex, MAR and Recent Results.

## 2018-09-17 NOTE — PROGRESS NOTES
Pending sale to [facility] Progress Note NAME: Dariana Vasquez :  1960 MRM:  [id number] Date/Time: 2018 8:54 AM 
 
  
Assessment and Plan: Dyspnea / Tachypnea / Sinus tachycardia - POA, subjectively severe, unclear etiology. CTA ruled out PE. Lack of fever, sputum, hypoxia and negative cx argue against PNA. Anxiety and effusions may contribute. Ativan and Ambien helped. Pulmonary consulted. They also cannot identify primary cause. Not hypoxic. Oxygen provides some comfort. Pulm started empiric steroids too. Palliative care consult. Diuretic did not help, overall, I doubt edema is cause. Checking speech/swallow today. Breast cancer, stage 4 / Bone metastases / Liver metastases / Brain metastasis - Nearing end stage. No chemo due to poor functional status. Brain irradiation for palliative comfort. Consulted oncology. Pneumonia - Possible, per pulmonary consult. Cx and serology negative. May be aspiration. Convert empiric azithro, ceftriaxone and flagyl to PO Augmentin for discharge if evidence of aspiration, or no abx if no evidence. Sepsis / Leukocytosis / Tachycardia / Tachypnea - POA, but unclear if infectious origin. WBC normal after 24hr. Never fever. Pulse and respirations persistent despite treatment, and may have non infectious causes as above. Start clonidine BID for tachycardia. Anxiety - Prn xanan PO Fatigue / dysphagia - Fall and aspiration precautions. PT/OT/speech eval. 
 
 
  
Subjective: Chief Complaint:  Still dyspnea, a bit better with xanax ROS: 
(bold if positive, if negative) SOB/SHINE Tolerating some PT  Tolerating Diet Objective:  
 
Last 24hrs VS reviewed since prior progress note. Most recent are: 
 
Visit Vitals  /87 (BP 1 Location: Right arm, BP Patient Position: At rest)  Pulse (!) 112  Temp 97.4 °F (36.3 °C)  Resp 21  
 Ht 5' 6\" (1.676 m)  Wt 61.3 kg (135 lb 3.2 oz)  SpO2 92%  BMI 21.82 kg/m2 SpO2 Readings from Last 6 Encounters:  
09/17/18 92% 08/15/18 98% 08/06/18 97% 07/17/18 97% 07/10/18 97% 06/04/18 98% O2 Flow Rate (L/min): 2 l/min Intake/Output Summary (Last 24 hours) at 09/17/18 8719 Last data filed at 09/17/18 0096 Gross per 24 hour Intake              400 ml Output                0 ml Net              400 ml Physical Exam: 
 
Gen:  Frail, in mild acute distress HEENT:  Pink conjunctivae, PERRL, hearing intact to voice, moist mucous membranes Neck:  Supple, without masses, thyroid non-tender Resp:  Mild accessory muscle use, bilateral breath sounds without wheezes rales or rhonchi 
Card:  No murmurs, tachycardic S1, S2 without thrills, bruits or peripheral edema Abd:  Soft, non-tender, non-distended, normoactive bowel sounds are present, no mass Lymph:  No cervical or inguinal adenopathy Musc:  No cyanosis or clubbing Skin:  No rashes or ulcers, skin turgor is reduced Neuro:  Cranial nerves are grossly intact, general motor weakness, follows commands Psych:  Good insight, oriented to person, place and time, alert Telemetry reviewed:   normal sinus rhythm 
__________________________________________________________________ Medications Reviewed: (see below) Medications:  
 
Current Facility-Administered Medications Medication Dose Route Frequency  cloNIDine HCl (CATAPRES) tablet 0.1 mg  0.1 mg Oral BID  budesonide (PULMICORT) 500 mcg/2 ml nebulizer suspension  500 mcg Nebulization BID RT  
 guaiFENesin ER (MUCINEX) tablet 600 mg  600 mg Oral Q12H  
 sodium chloride (AYR SALINE) 0.65 % nasal drops 2 Drop  2 Drop Both Nostrils Q2H PRN  pantoprazole (PROTONIX) tablet 40 mg  40 mg Oral ACB  ALPRAZolam (XANAX) tablet 0.5 mg  0.5 mg Oral QID PRN  
 lactobac ac& pc-s.therm-b.anim (KAYLA Q/RISAQUAD)  1 Cap Oral DAILY  LORazepam (ATIVAN) injection 1 mg  1 mg IntraVENous Q4H PRN  
  morphine IR (MS IR) tablet 15 mg  15 mg Oral Q4H PRN  
 enoxaparin (LOVENOX) injection 40 mg  40 mg SubCUTAneous Q24H  
 sodium chloride (NS) flush 5-10 mL  5-10 mL IntraVENous Q8H  
 sodium chloride (NS) flush 5-10 mL  5-10 mL IntraVENous PRN  
 sodium chloride (NS) flush 5-10 mL  5-10 mL IntraVENous Q8H  
 sodium chloride (NS) flush 5-10 mL  5-10 mL IntraVENous PRN  
 acetaminophen (TYLENOL) tablet 650 mg  650 mg Oral Q6H PRN  
 oxyCODONE IR (ROXICODONE) tablet 5 mg  5 mg Oral Q4H PRN  prochlorperazine (COMPAZINE) injection 10 mg  10 mg IntraVENous Q6H PRN  
 naloxone (NARCAN) injection 0.4 mg  0.4 mg IntraVENous PRN  
 docusate sodium (COLACE) capsule 100 mg  100 mg Oral BID  zolpidem (AMBIEN) tablet 5 mg  5 mg Oral QHS  promethazine (PHENERGAN) tablet 25 mg  25 mg Oral Q6H PRN  
 albuterol-ipratropium (DUO-NEB) 2.5 MG-0.5 MG/3 ML  3 mL Nebulization Q4H PRN  
 montelukast (SINGULAIR) tablet 10 mg  10 mg Oral DAILY  dexamethasone (DECADRON) tablet 4 mg  4 mg Oral Q12H Lab Data Reviewed: (see below) Lab Review:  
 
Recent Labs  
   09/15/18 
 0224  09/14/18 
 1218 WBC  9.6  13.7* HGB  13.5  15.4 HCT  39.5  44.4 PLT  406*  420* Recent Labs  
   09/15/18 
 0224  09/14/18 
 1345  09/14/18 
 1218 NA  138   --   132* K  4.2  4.6  HEMOLYZED,RECOLLECT REQUESTED  
CL  103   --   97  
CO2  24   --   25 GLU  107*   --   101* BUN  11   --   17  
CREA  0.59   --   0.59 CA  9.1   --   9.3 MG  2.2   --    --   
PHOS  4.2   --    --   
ALB   --    --   3.1* TBILI   --    --   0.6 SGOT   --    --   HEMOLYZED,RECOLLECT REQUESTED  
ALT   --    --   127* No results found for: Tan Lam Recent Labs  
   09/15/18 
 1635 PH  7.36  
PCO2  42 PO2  79* HCO3  23 No results for input(s): INR in the last 72 hours. No lab exists for component: INREXT, INREXT All Micro Results Procedure Component Value Units Date/Time CULTURE, BLOOD, PERIPHERAL [[id number]] Collected:  09/14/18 1259 Order Status:  Completed Specimen:  Blood Updated:  09/17/18 4418 Special Requests: NO SPECIAL REQUESTS Culture result: NO GROWTH 3 DAYS     
 CULTURE, RESPIRATORY/SPUTUM/BRONCH Karinej carlos Garcia [[id number]] Collected:  09/15/18 1615 Order Status:  Completed Specimen:  Sputum from Sputum Updated:  09/16/18 1155 Special Requests: NO SPECIAL REQUESTS     
  GRAM STAIN RARE WBCS SEEN     
        
  RARE EPITHELIAL CELLS SEEN  
        
  OCCASIONAL GRAM POSITIVE COCCI IN PAIRS  
   RARE GRAM POSITIVE RODS Culture result:      
  LIGHT PROBABLE NORMAL RESPIRATORY KAYLA  
   SO FAR  
 RESPIRATORY PANEL,PCR,NASOPHARYNGEAL [[id number]] Collected:  09/14/18 1841 Order Status:  Completed Specimen:  Nasopharyngeal Updated:  09/14/18 2229 Adenovirus NOT DETECTED Coronavirus 229E NOT DETECTED Coronavirus HKU1 NOT DETECTED Coronavirus CVNL63 NOT DETECTED Coronavirus OC43 NOT DETECTED Metapneumovirus NOT DETECTED Rhinovirus and Enterovirus NOT DETECTED Influenza A NOT DETECTED Influenza A, subtype H1 NOT DETECTED Influenza A, subtype H3 NOT DETECTED INFLUENZA A H1N1 PCR NOT DETECTED Influenza B NOT DETECTED Parainfluenza 1 NOT DETECTED Parainfluenza 2 NOT DETECTED Parainfluenza 3 NOT DETECTED Parainfluenza virus 4 NOT DETECTED     
  RSV by PCR NOT DETECTED Bordetella pertussis - PCR NOT DETECTED Chlamydophila pneumoniae DNA, QL, PCR NOT DETECTED Mycoplasma pneumoniae DNA, QL, PCR NOT DETECTED MYCOPLASMA AB, IGG/IGM [[id number]] Collected:  09/14/18 1841 Order Status:  Completed Specimen:  Serum Updated:  09/14/18 1920 LEE Lara, UR/CSF [[id number]] Collected:  09/14/18 1530 Order Status:  Canceled Specimen:  Other LEGIONELLA PNEUMOPHILA AG, URINE [[id number]] Collected:  09/14/18 1530 Order Status:  Canceled Specimen:  Urine from Urine CULTURE, BLOOD, PERIPHERAL [[id number]] Order Status:  Canceled Specimen:  Blood I have reviewed notes of prior 24hr. Other pertinent lab: none Total time spent with patient: 45 Minutes Care Plan discussed with: Patient, Family, Nursing Staff, Consultant/Specialist and >50% of time spent in counseling and coordination of care Discussed:  Care Plan Prophylaxis:  H2B/PPI Disposition:  Home w/Family 
        
___________________________________________________ Attending Physician: Analia Cabello MD

## 2018-09-17 NOTE — PROGRESS NOTES
Speech LAnguage Pathology bedside swallow evaluation Patient: Steve Porter (58 y.o. female) Date: 9/17/2018 Primary Diagnosis: Dyspnea Precautions:   Aspiration, Fall, DNR 
 
ASSESSMENT : 
Based on the objective data described below, the patient presents with mild-moderate oral dysphagia with oral residue, extended, fatigued chew on L. She also has new mild dysarthria with lingual involvement, L eye ptosis. Pharyngeal swallow appeared WFL,but she had R>L atelectasis vs consolidation  On CXR. Marilyn Chayo She has  A h/o metastatic breast CA (dx 2014)  With mets to liver, bones and brain,  In march 2018, she had R temporal brain metastatic resection and gamma knife. She has had SOB for months. . 
 
Patient will benefit from skilled intervention to address the above impairments. Patients rehabilitation potential is considered to be Fair Factors which may influence rehabilitation potential include:  
[]            None noted [x]            Mental ability/status [x]            Medical condition []            Home/family situation and support systems 
[]            Safety awareness 
[]            Pain tolerance/management []            Other: PLAN : 
Recommendations and Planned Interventions: Will proceed to MBS, based on CXR and pending discharge. She may need OP SLP therapy. Frequency/Duration: Patient will be followed by speech-language pathology 1 time a week to address goals. Discharge Recommendations: To Be Determined SUBJECTIVE:  
Patient stated I can't swallow well;my tongue gets tired. . OBJECTIVE:  
 
Past Medical History:  
Diagnosis Date  Breast CA ([facility] [address]  Metastatic cancer ([facility] [address] Past Surgical History:  
Procedure Laterality Date  BREAST SURGERY PROCEDURE UNLISTED Left 2004  
 mastectomy  HX GYN  2010  
 ovaries removed  HX VASCULAR ACCESS Right   
 portacath Prior Level of Function/Home Situation:  
Home Situation Home Environment: Private residence # Steps to Enter: 5 Rails to Enter: Yes Hand Rails : Bilateral 
One/Two Story Residence: Two story # of Interior Steps: 15 Interior Rails: Right Living Alone: No 
Support Systems: Spouse/Significant Other/Partner Patient Expects to be Discharged to[de-identified] Private residence Current DME Used/Available at Home: None Tub or Shower Type: Tub/Shower combination Diet prior to admission: regular, thins Current Diet:  Regular, thins Cognitive and Communication Status: 
Neurologic State: Alert Orientation Level: Oriented to person, Oriented to place, Oriented to situation, Oriented to time Cognition: Follows commands Perception: Appears intact Perseveration: No perseveration noted Safety/Judgement: Insight into deficits Patient sitting up herself on side of bed Oral Assessment: 
Oral Assessment Labial:  (L eye ptosis noted) Dentition: Natural 
Oral Hygiene: [facility] Lingual:  (L lingual deviation) Velum: No impairment Mandible: No impairment P.O. Trials: 
Patient Position: upright in bed Vocal quality prior to P.O.:  (patient c/o new onset of dysarthria and lingual weakness affecting her chew/swallowing) Consistency Presented: Thin liquid; Solid;Puree How Presented: Self-fed/presented;Spoon ORAL PHASE:  
Bolus Acceptance:  (impulsive eating) Bolus Formation/Control: Impaired (extended chew. pocketing on L side of mouth) Type of Impairment: Mastication Propulsion: Delayed (# of seconds) Oral Residue: Less than 10% of bolus (on L. She had bilateral cheek intrusion into oral cavity with inhalation) PHARYNGEAL PHASE:  
Initiation of Swallow: No impairment Laryngeal Elevation: Functional 
Aspiration Signs/Symptoms: None CXR indicates possible aspiration. NOMS:  
The NOMS functional outcome measure was used to quantify this patient's level of swallowing impairment. Based on the NOMS, the patient was determined to be at level 5 for swallow function G Codes: In compliance with CMSs Claims Based Outcome Reporting, the following G-code set was chosen for this patient based the use of the NOMS functional outcome to quantify this patient's level of swallowing impairment. Using the NOMS, the patient was determined to be at level 5 for swallow function which correlates with the CJ= 20-39% level of severity. Based on the objective assessment provided within this note, the current, goal, and discharge g-codes are as follows: 
 
Swallow  Swallowing: 
 Swallow Current Status CJ= 20-39%  Swallow Goal Status CI= 1-19% NOMS Swallowing Levels: 
Level 1 (CN): NPO Level 2 (CM): NPO but takes consistency in therapy Level 3 (CL): Takes less than 50% of nutrition p.o. and continues with nonoral feedings; and/or safe with mod cues; and/or max diet restriction Level 4 (CK): Safe swallow but needs mod cues; and/or mod diet restriction; and/or still requires some nonoral feeding/supplements Level 5 (CJ): Safe swallow with min diet restriction; and/or needs min cues Level 6 (CI): Independent with p.o.; rare cues; usually self cues; may need to avoid some foods or needs extra time Level 7 (CH): Independent for all p.o. LARRY. (2003). National Outcomes Measurement System (NOMS): Adult Speech-Language Pathology User's Guide. Pain: 
Pain Scale 1: Numeric (0 - 10) Pain Intensity 1: 0 After treatment:  
[]            Patient left in no apparent distress sitting up in chair 
[]            Patient left in no apparent distress in bed 
[]            Call bell left within reach 
[]            Nursing notified 
[]            Caregiver present 
[]            Bed alarm activated COMMUNICATION/EDUCATION:  
The patients plan of care including recommendations, planned interventions, and recommended diet changes were discussed with: Registered Nurse, Physician and oncology, pulmonary.  
 
Patient was educated regarding Her deficit(s) of dysphagia  as this relates to Her diagnosis of brain ca. She demonstrated Fair understanding as evidenced by impulsivity. []            Posted safety precautions in patient's room. [x]            Patient/family have participated as able in goal setting and plan of care. [x]            Patient/family agree to work toward stated goals and plan of care. []            Patient understands intent and goals of therapy, but is neutral about his/her participation. []            Patient is unable to participate in goal setting and plan of care. Thank you for this referral. 
William Watts, SLP Time Calculation: 10 mins

## 2018-09-17 NOTE — DISCHARGE SUMMARY
Physician Discharge Summary Patient ID: 
Claudette Ruddle [id number] 
2 y.o. 
1960 Admit date: 9/14/2018 Discharge date and time: 9/17/2018 Admission Diagnoses: Dyspnea Discharge Diagnoses:   
Principal Diagnosis Dyspnea Other Diagnoses Breast cancer, stage 4 ([facility] [address] (10/20/2014) Bone metastases ([facility] [address] (11/28/2016) Liver metastases ([facility] [address] (11/28/2016) Anxiety (5/29/2017) Fatigue (6/19/2017) Brain mass (2/16/2018) Brain metastasis ([facility] [address] (4/6/2018) Cough (4/6/2018) SHINE (dyspnea on exertion) (7/14/2018) Pneumonia (9/14/2018) Sinus tachycardia (9/15/2018) Tachypnea (9/15/2018) Hospital Course: Dyspnea / Tachypnea / Sinus tachycardia - POA, subjectively severe, unclear etiology. CTA ruled out PE. Lack of fever, sputum, hypoxia and negative cx argue against PNA. Anxiety and effusions may contribute. Ativan and Ambien helped. Pulmonary consulted. They also cannot identify primary cause. Not hypoxic, though Oxygen provides some comfort. Pulm started empiric steroids and pulmicort. Palliative care consult. Diuretic did not help, overall, I doubt edema was the cause. Checking speech/swallow today. 
  
Breast cancer, stage 4 / Bone metastases / Liver metastases / Brain metastasis - Nearing end stage. No chemo due to poor functional status. Brain irradiation for palliative comfort. Consulted oncology. 
  
Pneumonia - Possible, per pulmonary consult. Cx and serology negative. May be aspiration. Convert empiric azithro, ceftriaxone and flagyl to PO Augmentin for discharge if evidence of aspiration, or no abx if no evidence.   
  
Sepsis / Leukocytosis / Tachycardia / Tachypnea - POA, but unclear if infectious origin. WBC normal after 24hr. Never fever. Pulse and respirations persistent despite treatment, and may have non infectious causes as above.   Start clonidine BID for tachycardia. 
  
 Anxiety - Prn xanan PO 
  
Fatigue / dysphagia - Fall and aspiration precautions. PT/OT/speech eval. 
  
PCP: Yuli Hernandez MD 
 
Consults: Pulmonary/Intensive care and Hematology/Oncology Significant Diagnostic Studies: See Hospital Course Discharged home in improved condition. Discharge Exam:  /87 (BP 1 Location: Right arm, BP Patient Position: At rest)  Pulse (!) 112  Temp 97.4 °F (36.3 °C)  Resp 21  
 Ht 5' 6\" (1.676 m)  Wt 61.3 kg (135 lb 3.2 oz)  SpO2 92%  BMI 21.82 kg/m2  
  
Gen:  Frail, in mild acute distress HEENT:  Pink conjunctivae, PERRL, hearing intact to voice, moist mucous membranes Neck:  Supple, without masses, thyroid non-tender Resp:  Mild accessory muscle use, bilateral breath sounds without wheezes rales or rhonchi 
Card:  No murmurs, tachycardic S1, S2 without thrills, bruits or peripheral edema Abd:  Soft, non-tender, non-distended, normoactive bowel sounds are present, no mass Lymph:  No cervical or inguinal adenopathy Musc:  No cyanosis or clubbing Skin:  No rashes or ulcers, skin turgor is reduced Neuro:  Cranial nerves are grossly intact, general motor weakness, follows commands Psych:  Good insight, oriented to person, place and time, alert Patient Instructions:  
Current Discharge Medication List  
  
START taking these medications Details ALPRAZolam (XANAX) 0.5 mg tablet Take 1 Tab by mouth four (4) times daily as needed for Anxiety for up to 30 days. Max Daily Amount: 2 mg. Qty: 20 Tab, Refills: 0 Associated Diagnoses: Brain metastasis ([facility] [address] Anxiety  
  
budesonide (PULMICORT) 0.5 mg/2 mL nbsp 2 mL by Nebulization route two (2) times a day for 30 days. Qty: 60 Each, Refills: 0  
  
cloNIDine HCl (CATAPRES) 0.1 mg tablet Take 1 Tab by mouth two (2) times a day for 30 days. Qty: 60 Tab, Refills: 0  
  
guaiFENesin ER (MUCINEX) 600 mg ER tablet Take 1 Tab by mouth every twelve (12) hours for 10 days. Qty: 20 Tab, Refills: 0  
  
pantoprazole (PROTONIX) 40 mg tablet Take 1 Tab by mouth Daily (before breakfast) for 30 days. Qty: 30 Tab, Refills: 0 CONTINUE these medications which have NOT CHANGED Details  
dexamethasone (DECADRON) 2 mg tablet Take 4 mg by mouth two (2) times daily (with meals). montelukast (SINGULAIR) 10 mg tablet Take 10 mg by mouth daily. ibuprofen (MOTRIN) 200 mg tablet Take 400 mg by mouth every eight (8) hours as needed for Pain. acetaminophen (TYLENOL) 500 mg tablet Take 500 mg by mouth every six (6) hours as needed for Pain. butalbital-acetaminophen-caff (FIORICET) -40 mg per capsule Take 1 Cap by mouth two (2) times daily as needed for Headache. oxyCODONE IR (ROXICODONE) 5 mg immediate release tablet Take 5 mg by mouth daily as needed for Pain. promethazine (PHENERGAN) 25 mg tablet Take 25 mg by mouth every six (6) hours as needed for Nausea. ascorbic acid, vitamin C, (VITAMIN C) 250 mg tablet Take 250 mg by mouth daily. LACTOBAC CMB #3/FOS/PANTETHINE (PROBIOTIC & ACIDOPHILUS PO) Take 1 Cap by mouth daily. STOP taking these medications  
  
 oxyCODONE (OXYIR) 5 mg capsule Comments:  
Reason for Stopping:   
   
  
 
Activity: Activity as tolerated Diet: Regular Diet, Increase noncaffeinated fluids and Resume previous diet Wound Care: None needed Follow-up with your PCP and oncology in a few days. Follow-up tests/labs - none Signed: 
Silver Muhammad MD 
9/17/2018 
9:04 AM

## 2018-09-17 NOTE — PROGRESS NOTES
Speech Pathology Modified barium swallow Study Patient: Charity Worthington (58 y.o. female) Date: 9/17/2018 Primary Diagnosis: Dyspnea Precautions:   Aspiration, Fall, DNR 
 
ASSESSMENT : 
Based on the objective data described below, the patient presents with no juvencio aspiration. However, she does have moderate weakness in base of tongue and pharyngeal constriction that resulted in significantly more L greater then right upper pharyngeal residue without awareness. This places her at risk for aspiration after the swallow. She also had.  mild-moderate oral dysphagia with a-p propulsion issues, reduced chew and L oral residue. Patient states dysphagia is new in the last 2 weeks. . 
Minutes after MBS complete, patient had a severe coughing instance and c/o food stuck, pointing at her esophagus. It cleared after a few minutes with sips of water. Can not r/o esophageal component to dysphagia as well. Patient will benefit from skilled intervention to address the above impairments. Patients rehabilitation potential is considered to be Fair Factors which may influence rehabilitation potential include:  
[]              None noted [x]              Mental ability/status [x]              Medical condition []              Home/family situation and support systems []              Safety awareness []              Pain tolerance/management 
[]              Other: PLAN : 
Recommendations and Planned Interventions: 
Diet as tolerated. Alternate food and drinks. Multiple sips with pills. May benefit from OP swallowing therapy for compensatory strategies, exercises and help with diet recommendations. Frequency/Duration: Patient will be followed by speech-language pathology 1 time a week to address goals. Discharge Recommendations: OP SLP SUBJECTIVE:  
Patient alert in MBS. . 
 
OBJECTIVE:  
 
Past Medical History:  
Diagnosis Date  Breast CA ([facility] [address]  Metastatic cancer ([facility] [address] Past Surgical History:  
Procedure Laterality Date  BREAST SURGERY PROCEDURE UNLISTED Left 2004  
 mastectomy  HX GYN  2010  
 ovaries removed  HX VASCULAR ACCESS Right   
 portacath Prior Level of Function/Home Situation:  
Home Situation Home Environment: Private residence # Steps to Enter: 5 Rails to Enter: Yes Hand Rails : Bilateral 
One/Two Story Residence: Two story # of Interior Steps: 15 Interior Rails: Right Living Alone: No 
Support Systems: Spouse/Significant Other/Partner Patient Expects to be Discharged to[de-identified] Private residence Current DME Used/Available at Home: None Tub or Shower Type: Tub/Shower combination Diet prior to admission: regular, thins Current Diet:  Regular, thins Radiologist: [facility] Film Views: Lateral;AP Patient Position: upright in Hausted chair Trial 1: Trial 2:  
Consistency Presented: Thin liquid; Solid;Puree;Pill/Tablet How Presented: Self-fed/presented;Cup/gulp; Spoon;Straw;Successive swallows ORAL PHASE: moderate issues:     
Bolus Acceptance: No impairment Bolus Formation/Control: Impaired: Anterior;Posterior;Mastication  :    
Propulsion: Delayed (# of seconds) Oral Residue: Lingual;Left PHARYNGEAL PHASE:     
Initiation of Swallow: No impairment Timing: No impairment Penetration: Flash/transient (with thins when drinking multiple sips of water wtih  pills) Aspiration/Timing: No evidence of aspiration Pharyngeal Clearance: Vallecular residue;Greater than 50% (with purees, solids. She stated that she had sensation for residue, but did not independently try to clear. residue reduced with liquid wash; residue did start to spill over airway slowly) pill was also stuck in valleculae, which she did feel and chugged multiple sips of liquids. she did have trace ,transient penetration x1 at this time. 09/17/18 al    
Attempted Modifications: Alternate liquids/solids; Double swallow Effective Modifications: Alternate liquids/solids; Double swallow (had to cue) Trial 3: Trial 4:  
 patient had a severe coughing instance several minutes after MBS> cough fair in strengt and dry. She c/o food stuck and pointed to esophagus. Gave her some water and sensation eventually cleared. :    :    
     
     
    
     
     
     
     
     
     
     
     
 
Decreased Tongue Base Retraction?: Yes (suspect more on L than right, based on L residue) Laryngeal Elevation: WFL (within functional limits) Aspiration/Penetration Score: 2 (Penetration/No residue-Contrast enters the airway penetrates, remains above the folds/cords, and is cleared) NOMS:  
The NOMS functional outcome measure was used to quantify this patient's level of swallowing impairment. Based on the NOMS, the patient was determined to be at level 5 for swallow function G Codes: In compliance with CMSs Claims Based Outcome Reporting, the following G-code set was chosen for this patient based the use of the NOMS functional outcome to quantify this patient's level of swallowing impairment. Using the NOMS, the patient was determined to be at level 5 for swallow function which correlates with the CJ= 20-39% level of severity. Based on the objective assessment provided within this note, the current, goal, and discharge g-codes are as follows: 
 
Swallow  Swallowing: 
 Swallow Current Status CJ= 20-39%  Swallow Goal Status CJ= 20-39%  Swallow D/C Status CJ= 20-39% NOMS Swallowing Levels: 
Level 1 (CN): NPO Level 2 (CM): NPO but takes consistency in therapy Level 3 (CL): Takes less than 50% of nutrition p.o. and continues with nonoral feedings; and/or safe with mod cues; and/or max diet restriction Level 4 (CK): Safe swallow but needs mod cues; and/or mod diet restriction; and/or still requires some nonoral feeding/supplements Level 5 (CJ): Safe swallow with min diet restriction; and/or needs min cues Level 6 (CI): Independent with p.o.; rare cues; usually self cues; may need to avoid some foods or needs extra time Level 7 (CH): Independent for all p.o. LARRY. (2003). National Outcomes Measurement System (NOMS): Adult Speech-Language Pathology User's Guide. COMMUNICATION/EDUCATION:  
Patient was educated regarding Her deficit(s) of oral-pharyngeal dysphagia, especially on L side as this relates to Her diagnosis of brain cancer. She demonstrated Fair understanding as evidenced by discussion. Ronna Penn The patients plan of care including findings from MBS, recommendations, planned interventions, and recommended diet changes were discussed with: patient and , RN, MD. 
[]  Posted safety precautions in patient's room. []  Patient/family have participated as able in goal setting and plan of care. []  Patient/family agree to work toward stated goals and plan of care. []  Patient understands intent and goals of therapy, but is neutral about his/her participation. []  Patient is unable to participate in goal setting and plan of care. Thank you for this referral. 
Kimberly Arthur, ANTHONY Time Calculation: 15 mins

## 2018-09-17 NOTE — PROGRESS NOTES
I met with the patient and her . Pt would like home health vs OP therapy. She would like PT, SLP and RN at home. I will need orders to set that up for the patient.  Thanks JANET Dale

## 2018-09-19 NOTE — TELEPHONE ENCOUNTER
HIPAA verified. Jaz Emery requested that Dr. Tricia Hairston see patient at [facility] tomorrow due to patient weakness and proximity to home. RN gave # to [facility] @ [phone] if requests to transfer care.  stated needs order for home PT/Speech Therapy through [facility]. Also would like continuous FMLA for self and Julia. Jaz Emery stated that patient lost PCP. RN will research PCPs in area and notify. Support given. To provider for orders and plan review.

## 2018-09-19 NOTE — TELEPHONE ENCOUNTER
Am confused  No one from hospital ordered [facility]? And pt wants to see our team at [facility] now?   Am fine with this  Navigator help please

## 2018-09-19 NOTE — TELEPHONE ENCOUNTER
Call to [facility] and advised Dr. Nicolle Dash will cover for home health. Thanked for call.

## 2018-09-19 NOTE — TELEPHONE ENCOUNTER
Call from [facility]. Stated patient was to see PCP today for review of home health plan, but that patient cancelled appt. Marley Richards requested to know if Dr. Michael Romero would cover for home health orders. Advised would forward to provider.

## 2018-09-20 NOTE — TELEPHONE ENCOUNTER
FMLA for Violeta Castorena faxed to employer/complete and to [facility] for scanning. Call to  to clarify FMLA for patient. Message left that need start date of FMLA for Julia and jennifer Castorena would like the FMLA.

## 2018-09-21 ENCOUNTER — TELEPHONE (OUTPATIENT)
Dept: ONCOLOGY | Age: 58
End: 2018-09-21

## 2018-09-21 NOTE — TELEPHONE ENCOUNTER
Radiation Oncology at [facility] S Keesha Pisano called on patient's behalf. Daughter called their office this morning to make them aware patient had passed away. Wanted to pass the message along to Dr. Baron Ta bartlett

## 2019-02-10 NOTE — PROGRESS NOTES
HEME/ONC CONSULT       Trevor Ortega is a 64 y.o. 1960 female and presents with Breast Cancer    CC  Breast cancer 2004    HPI: Ms. Yvette Dacosta is here today for follow-up for stage 4 breast cancer ER+ HER2 + on kadcyla done cycle 1. Pt is here for chemo f/u. Pt felt bad after chemo for about 5 days. Had SOB/ cough/ fatigue but better now. Pt wants dose lowered a little. Pt asking about lowering decadron also. Pt feels really good overall. No pain. Last visit:  off Herceptin due to progressive disease. Pt is here today with family to discuss possible treatment options. Pt has been resistant to chemo due to side effects but is now considering given burden of disease. Has lots of questions regarding possible chemo. Feeling ok overall. Anxious about chemo. Still doing alternative therapies. completed radiation to her spine cord compression in December 2016. DX   Encounter Diagnoses   Name Primary?  Breast cancer, stage 4, unspecified laterality Yes    Liver metastases (HCC)     Bone metastases (HCC)     Fatigue, unspecified type         STAGE: 4 liver and bones    TREATMENT COURSE: kadcyla started 6/17.   4/2004, s/p L mastectomy with Dr. Nat Sumner at [facility]. Stage IIB, T2N1M0, 3/39 LN +, ER negative, OK +, HER 2 +, was enrolled on NSABP B-31 trial with adriamycin/cytoxan q 3 weeks x 4 followed by taxol weekly with herceptin x 12 then completion of a year of herceptin. States she did not receive XRT. Did receive 5 years of tamoxifen from [id number], which she tolerated well. In 2009 she noticed a chest wall mass. 11/23/10 FNA of chest wall mass showed ER + > 90%, OK + > 90%, MIB 50%, HER 2 + by FISH ratio 6.29. S/p BSO 12/27/10. Received zometa from 2010 to 2011. In 2011, she was started on q 3 week trastuzumab and exemestane, but states she took the exemestane sparingly due to joint pains and body aches. Has not taken any AI since 2013. Briefly took anastrozole in 5/2014.    Stopped anastrozole 1/1/15 due to continued body and joint aches. Started Herceptin 1/9/17    No past medical history on file. Past Surgical History:   Procedure Laterality Date    BREAST SURGERY PROCEDURE UNLISTED  2004    mastectomy    HX GYN  2010    ovaries removed     Social History     Social History    Marital status:      Spouse name: N/A    Number of children: N/A    Years of education: N/A     Social History Main Topics    Smoking status: Never Smoker    Smokeless tobacco: Never Used    Alcohol use Yes      Comment: 3-4 drinks/month    Drug use: No    Sexual activity: No     Other Topics Concern    None     Social History Narrative     Family History   Problem Relation Age of Onset    Hypertension Mother     Cancer Father      mesothelioma    Cancer Maternal Grandfather 76     Bladder       Current Outpatient Prescriptions   Medication Sig Dispense Refill    OTHER Indications: Organic Sulfa- Powder mix with liquids  , 4x per day      ascorbic acid, vitamin C, (VITAMIN C) 250 mg tablet Take 250 mg by mouth daily.  MILK THISTLE, BULK, by Does Not Apply route.  IODINE PO Take  by mouth.  BETA 1,3 GLUCAN, BULK, 3 Tabs by Does Not Apply route daily.  OTHER,NON-FORMULARY, Mushroom extract twice daily; Crocifurous extract      TURMERIC, BULK, by Does Not Apply route daily.  cholecalciferol (VITAMIN D3) 1,000 unit tablet Take 4,000 Units by mouth daily.  multivitamin (ONE A DAY) tablet Take 1 tablet by mouth daily.  coenzyme q10 (CO Q-10) 10 mg cap Take  by mouth daily.  LACTOBAC CMB #3/FOS/PANTETHINE (PROBIOTIC & ACIDOPHILUS PO) Take  by mouth daily.  magnesium 250 mg tab Take 500 mg by mouth daily. Allergies   Allergen Reactions    Pcn [Penicillins] Anaphylaxis    Contrast Agent [Iodine] Shortness of Breath and Itching     Patient reported during phone conversation on 4/18/17.        Review of Systems    A comprehensive review of systems was negative except for: per HPI     Objective:  Visit Vitals    /71    Pulse 82    Resp 16    Ht 5' 6\" (1.676 m)    Wt 150 lb 3.2 oz (68.1 kg)    SpO2 98%    BMI 24.24 kg/m2     Physical Exam:   General appearance - alert, well appearing, and in no distress, oriented to person, place, and time and normal appearing weight  Mental status - alert, oriented to person, place, and time, normal mood, behavior, speech, dress, motor activity, and thought processes  EYE-conj clear  Mouth - mucous membranes moist  Neck - supple  Chest - clear to auscultation  Heart - normal rate and regular rhythm   Abdomen - soft, nontender  Ext-no pedal edema noted  Skin-Warm and dry. Intact without rash. Neuro -alert, oriented, normal speech, no focal findings or movement disorder noted    Diagnostic Imaging   reviewed    Results for orders placed during the hospital encounter of 12/07/16   CT CHEST ABD PELV W CONT    Narrative STUDY: CT CHEST ABD PELV W CONT, ABDOMEN, AND PELVIS WITH CONTRAST  Clinical history: Metastatic breast cancer, evaluate for progression      INDICATION: metastatic breast cancer, evaluate disease status    CONTRAST: 95 mL Isovue-[id number]        COMPARISON: 8/10/2015, 11/10/14    TECHNIQUE:  Computed tomography images of the chest, abdomen, and pelvis were  obtained from the thoracic inlet through the pubic symphysis via spiral  acquisition after the administration of intravenous contrast. Multiplanar  reconstructions were performed in soft tissue and lung windows. FINDINGS:   CHEST:  Thyroid:  Normal in appearance. Mediastinum: There has been revision of a right IJ Port-A-Cath. A Port-A-Cath  tip projects in the right atrium. Normal size heart. No significant pericardial  fluid. Normal-sized main pulmonary artery. No lymphadenopathy. Lungs/pleura: Patent central airways. No airspace disease or pleural effusion. Unchanged nonspecific subcentimeter groundglass opacity left lower lobe .     ABDOMEN AND PELVIS:  Liver and gallbladder:           Hepatic hypodensities    Segment 2 2-54 left hepatic lobe 12.8 x 12.8 mm. Right hepatic lobe 2-53 9 x 9 mm. Right hepatic lobe 2-43 8 x 8 mm. There are additionally 2 tiny hypodensities adjacent to the posterior right  hepatic vein also seen on image 2-53 approximately 4 to 5 mm in size. .       Normal-appearing gallbladder. Spleen, adrenal glands, and pancreas:  Normal in appearance. Kidneys:  Symmetric enhancement. No hydronephrosis. Bowel and peritoneum:  No bowel obstruction. No ascites. Genitourinary:  Normal appearing uterus, adnexa, and bladder. Vasculature and lymph nodes:  Patent vasculature. No lymphadenopathy. Osseous structures the chest, abdomen, and pelvis: Unchanged sclerotic  metastasis with severe pathologic compression fracture at T9. Unchanged  sclerotic metastases seen in the spine. Body wall:  Status post left mastectomy and breast implant placement. Postsurgical changes seen in the left axilla. Impression IMPRESSION:  1. Findings are consistent with interval progression of disease. There are new  hepatic lesions present up to 13 x 13 mm in size. .    2. Unchanged osseous metastases.          Lab Results  reviewed  Lab Results   Component Value Date/Time    WBC 4.2 06/01/2017 01:48 PM    HGB 11.7 06/01/2017 01:48 PM    HCT 34.0 06/01/2017 01:48 PM    PLATELET 360 78/59/7121 01:48 PM    MCV 92.9 06/01/2017 01:48 PM       Lab Results   Component Value Date/Time    Sodium 140 06/01/2017 01:48 PM    Potassium 4.0 06/01/2017 01:48 PM    Chloride 103 06/01/2017 01:48 PM    CO2 28 06/01/2017 01:48 PM    Anion gap 9 06/01/2017 01:48 PM    Glucose 115 06/01/2017 01:48 PM    BUN 16 06/01/2017 01:48 PM    Creatinine 0.72 06/01/2017 01:48 PM    BUN/Creatinine ratio 22 06/01/2017 01:48 PM    GFR est AA >60 06/01/2017 01:48 PM    GFR est non-AA >60 06/01/2017 01:48 PM    Calcium 8.8 06/01/2017 01:48 PM    AST (SGOT) 86 06/01/2017 01:48 PM Alk. phosphatase 155 06/01/2017 01:48 PM    Protein, total 7.4 06/01/2017 01:48 PM    Albumin 3.6 06/01/2017 01:48 PM    Globulin 3.8 06/01/2017 01:48 PM    A-G Ratio 0.9 06/01/2017 01:48 PM    ALT (SGPT) 95 06/01/2017 01:48 PM       Assessment/Plan:    1. Stage 4 breast cancer ER+ HER2 amplified at recurrence with mets to liver and bones in past.   Patient presented in 12/2016 with worsening back pain. MRI spine showed cord compression. CT's showed worsened bone and liver disease. She completed radiation to the spine. Pt preferred to do herceptin only and f/u PET on herceptin looks worse with progressive disease in lungs/ liver/ bones. Pt is on kadcyla and tolerated cycle 1 well. Will continue kadcyla. Reviewed risks, benefits and alternatives of kadcyla today. Having some side effects and will try prilosec. Pt is agreeble to further therapy. Pt clinically doing well overall. 2. Back pain. Resolved. No pain at this time. She is s/p radiation to the spine for cord compression. 3.  Anxiety about health. Support good today with family. Will get navigator also during chemo. 4.  Needs PCP and is ready to see one. Wants integrative and will send to dr Fady Brink. Set up text for my chart for pt today. Call if questions. Follow-up in 4 weeks. ICD-10-CM ICD-9-CM    1. Breast cancer, stage 4, unspecified laterality C50.919 174.9 REFERRAL TO INTERNAL MEDICINE   2. Liver metastases ([address] C78.7 197.7 REFERRAL TO INTERNAL MEDICINE   3. Bone metastases (HCC) C79.51 198.5 REFERRAL TO INTERNAL MEDICINE   4. Fatigue, unspecified type R53.83 780.79 REFERRAL TO INTERNAL MEDICINE       Current Outpatient Prescriptions   Medication Sig    OTHER Indications: Organic Sulfa- Powder mix with liquids  , 4x per day    ascorbic acid, vitamin C, (VITAMIN C) 250 mg tablet Take 250 mg by mouth daily.  MILK THISTLE, BULK, by Does Not Apply route.  IODINE PO Take  by mouth.     BETA 1,3 GLUCAN, BULK, 3 Tabs by Does Not Apply route daily.  OTHER,NON-FORMULARY, Mushroom extract twice daily; Crocifurous extract    TURMERIC, BULK, by Does Not Apply route daily.  cholecalciferol (VITAMIN D3) 1,000 unit tablet Take 4,000 Units by mouth daily.  multivitamin (ONE A DAY) tablet Take 1 tablet by mouth daily.  coenzyme q10 (CO Q-10) 10 mg cap Take  by mouth daily.  LACTOBAC CMB #3/FOS/PANTETHINE (PROBIOTIC & ACIDOPHILUS PO) Take  by mouth daily.  magnesium 250 mg tab Take 500 mg by mouth daily. No current facility-administered medications for this visit. continue present plan, routine labs ordered, call if any problems  There are no Patient Instructions on file for this visit. Follow-up Disposition:  Return in about 4 weeks (around 7/17/2017).     Nisa Johnson DO rehabilitation facility

## 2021-03-08 NOTE — ED TRIAGE NOTES
Triage:  Pt to ED due to abnormal findings on a brain MRI today. Pt is a cancer Pt under the care of Dr. Lucero Johnson. Pt had MRI due to reoccurring headaches and intermittent vision changes. Pt states she was called by Oncology and referred to ED due to the findings. Pt in triage with stable gait, pt denies any HA or vision changes. initiation of breastfeeding/breast milk feeding

## 2021-05-03 NOTE — TELEPHONE ENCOUNTER
HIPAA verified. Advised of NP note. Will proceed to ED. Composite Graft Text: The defect edges were debeveled with a #15 scalpel blade.  Given the location of the defect, shape of the defect, the proximity to free margins and the fact the defect was full thickness a composite graft was deemed most appropriate.  The defect was outline and then transferred to the donor site.  A full thickness graft was then excised from the donor site. The graft was then placed in the primary defect, oriented appropriately and then sutured into place.  The secondary defect was then repaired using a primary closure.

## (undated) DEVICE — CATH IV AUTOGRD BC BLU 22GA 25 -- INSYTE

## (undated) DEVICE — REM POLYHESIVE ADULT PATIENT RETURN ELECTRODE: Brand: VALLEYLAB

## (undated) DEVICE — FCPS BIOP PULM RAD JAW 100CML -- BX/10 M00515180

## (undated) DEVICE — SYR 10ML LUER LOK 1/5ML GRAD --

## (undated) DEVICE — INFECTION CONTROL KIT SYS

## (undated) DEVICE — BITEBLOCK ENDOSCP 60FR MAXI WHT POLYETH STURDY W/ VELC WVN

## (undated) DEVICE — TOOL 8TD126 LEGEND 8CM 1.2MM 6MM DEPTH: Brand: MIDAS REX ™

## (undated) DEVICE — RUBBERBAND FASTENING W0.25XL3.5IN 5 PER PK

## (undated) DEVICE — NDL FLTR TIP 5 MIC 18GX1.5IN --

## (undated) DEVICE — Device

## (undated) DEVICE — TOOL F2/8TA23 LEGEND 8CM 2.3MM TAPER: Brand: MIDAS REX™

## (undated) DEVICE — SYR 5ML 1/5 GRAD LL NSAF LF --

## (undated) DEVICE — 1200 GUARD II KIT W/5MM TUBE W/O VAC TUBE: Brand: GUARDIAN

## (undated) DEVICE — TRAP SUC MUCOUS 70ML -- MEDICHOICE MEDLINE

## (undated) DEVICE — KIT COLON W/ 1.1OZ LUB AND 2 END

## (undated) DEVICE — BAG BELONG PT PERS CLEAR HANDL

## (undated) DEVICE — NDL PRT INJ NSAF BLNT 18GX1.5 --

## (undated) DEVICE — CODMAN® SURGICAL PATTIES 1/2" X 3" (1.27CM X 7.62CM): Brand: CODMAN®

## (undated) DEVICE — SYR 3ML LL TIP 1/10ML GRAD --

## (undated) DEVICE — BAG SPEC BIOHZRD 10 X 10 IN --

## (undated) DEVICE — SOLUTION IV 1000ML 0.9% SOD CHL

## (undated) DEVICE — DEVON™ KNEE AND BODY STRAP 60" X 3" (1.5 M X 7.6 CM): Brand: DEVON

## (undated) DEVICE — SYRINGE MED 10CC ECC TIP W/O NDL

## (undated) DEVICE — TOOL 8TA11 LEGEND 8CM 1.1MM TA: Brand: MIDAS REX ™

## (undated) DEVICE — SUTURE NRLN SZ 4-0 L18IN NONABSORBABLE BLK L13MM TF 1/2 CIR C584D

## (undated) DEVICE — KENDALL RADIOLUCENT FOAM MONITORING ELECTRODE -RECTANGULAR SHAPE: Brand: KENDALL

## (undated) DEVICE — SOLIDIFIER MEDC 1200ML -- CONVERT TO 356117

## (undated) DEVICE — AGENT HEMSTAT W2XL14IN OXIDIZED REGENERATED CELOS ABSRB FOR

## (undated) DEVICE — SUTURE VCRL SZ 2-0 L18IN ABSRB UD L26MM CP-2 1/2 CIR REV J762D

## (undated) DEVICE — AIRLIFE™ ADULT OXYGEN MASK VINYL, UNDER-THE-CHIN STYLE, 3 IN 1 MASK WITH 7 FEET (2.1 M) CRUSH-RESISTANT TUBING AND U/CONNECT-IT ADAPTER: Brand: AIRLIFE™

## (undated) DEVICE — CONTAINER SPEC 20 ML LID NEUT BUFF FORMALIN 10 % POLYPR STS

## (undated) DEVICE — BRUSH CYTO BRONCHSCP 1.5/140MM -- CELLEBRITY

## (undated) DEVICE — KENDALL SCD EXPRESS SLEEVES, KNEE LENGTH, MEDIUM: Brand: KENDALL SCD

## (undated) DEVICE — DRAPE FLD WRM W44XL66IN C6L FOR INTRATEMP SYS THERMABASIN

## (undated) DEVICE — STERILE POLYISOPRENE POWDER-FREE SURGICAL GLOVES WITH EMOLLIENT COATING: Brand: PROTEXIS

## (undated) DEVICE — PREP SKN PREVAIL 40ML APPL --

## (undated) DEVICE — TOOL 9AC90 LEGEND 9CM 9MM AC: Brand: MIDAS REX

## (undated) DEVICE — SURGICAL PROCEDURE KIT CRANIOTOMY

## (undated) DEVICE — MARKER SKIN XR REFLCT LF SPHR DISP

## (undated) DEVICE — BASIN EMSIS 16OZ GRAPHITE PLAS KID SHP MOLD GRAD FOR ORAL

## (undated) DEVICE — SET ADMIN 16ML TBNG L100IN 2 Y INJ SITE IV PIGGY BK DISP

## (undated) DEVICE — AIRLIFE™ CORRUGATED FLEXIBLE EVA TUBING FOR AEROSOL AND IPPB USE, SEGMENTED, 6 FEET (1.8 M) LENGTH, 22 MM I.D.: Brand: AIRLIFE™

## (undated) DEVICE — SOLUTION IV 250ML 0.9% SOD CHL CLR INJ FLX BG CONT PRT CLSR

## (undated) DEVICE — FLOSEAL MATRIX IS INDICATED IN SURGICAL PROCEDURES (OTHER THAN IN OPHTHALMIC) AS AN ADJUNCT TO HEMOSTASIS WHEN CONTROL OF BLEEDING BY LIGATURE OR CONVENTIONALPROCEDURES IS INEFFECTIVE OR IMPRACTICAL.: Brand: FLOSEAL HEMOSTATIC MATRIX